# Patient Record
Sex: MALE | Race: BLACK OR AFRICAN AMERICAN | Employment: OTHER | URBAN - METROPOLITAN AREA
[De-identification: names, ages, dates, MRNs, and addresses within clinical notes are randomized per-mention and may not be internally consistent; named-entity substitution may affect disease eponyms.]

---

## 2021-01-19 ENCOUNTER — PATIENT OUTREACH (OUTPATIENT)
Dept: CASE MANAGEMENT | Age: 56
End: 2021-01-19

## 2021-01-19 ENCOUNTER — HOSPITAL ENCOUNTER (OUTPATIENT)
Dept: LAB | Age: 56
Discharge: HOME | End: 2021-01-19
Payer: MEDICARE

## 2021-01-19 DIAGNOSIS — C90.00 MULTIPLE MYELOMA NOT HAVING ACHIEVED REMISSION (HCC): ICD-10-CM

## 2021-01-19 LAB
25(OH)D3 SERPL-MCNC: 22.7 NG/ML (ref 30–100)
ALBUMIN SERPL-MCNC: 3.6 G/DL (ref 3.5–5)
ALBUMIN/GLOB SERPL: 1.1 {RATIO} (ref 1.2–3.5)
ALP SERPL-CCNC: 113 U/L (ref 50–136)
ALT SERPL-CCNC: 30 U/L (ref 12–65)
ANION GAP SERPL CALC-SCNC: 6 MMOL/L (ref 7–16)
AST SERPL-CCNC: 7 U/L (ref 15–37)
BASOPHILS # BLD: 0 K/UL (ref 0–0.2)
BASOPHILS NFR BLD: 0 % (ref 0–2)
BILIRUB SERPL-MCNC: 0.2 MG/DL (ref 0.2–1.1)
BUN SERPL-MCNC: 12 MG/DL (ref 6–23)
CALCIUM SERPL-MCNC: 9.2 MG/DL (ref 8.3–10.4)
CHLORIDE SERPL-SCNC: 103 MMOL/L (ref 98–107)
CO2 SERPL-SCNC: 26 MMOL/L (ref 21–32)
CREAT SERPL-MCNC: 0.7 MG/DL (ref 0.8–1.5)
DIFFERENTIAL METHOD BLD: ABNORMAL
EOSINOPHIL # BLD: 0 K/UL (ref 0–0.8)
EOSINOPHIL NFR BLD: 0 % (ref 0.5–7.8)
ERYTHROCYTE [DISTWIDTH] IN BLOOD BY AUTOMATED COUNT: 18 % (ref 11.9–14.6)
GLOBULIN SER CALC-MCNC: 3.2 G/DL (ref 2.3–3.5)
GLUCOSE SERPL-MCNC: 105 MG/DL (ref 65–100)
HCT VFR BLD AUTO: 35.5 % (ref 41.1–50.3)
HGB BLD-MCNC: 11 G/DL (ref 13.6–17.2)
IMM GRANULOCYTES # BLD AUTO: 0.1 K/UL (ref 0–0.5)
IMM GRANULOCYTES NFR BLD AUTO: 1 % (ref 0–5)
LYMPHOCYTES # BLD: 2.2 K/UL (ref 0.5–4.6)
LYMPHOCYTES NFR BLD: 19 % (ref 13–44)
MCH RBC QN AUTO: 25.6 PG (ref 26.1–32.9)
MCHC RBC AUTO-ENTMCNC: 31 G/DL (ref 31.4–35)
MCV RBC AUTO: 82.8 FL (ref 79.6–97.8)
MONOCYTES # BLD: 1.2 K/UL (ref 0.1–1.3)
MONOCYTES NFR BLD: 10 % (ref 4–12)
NEUTS SEG # BLD: 8.6 K/UL (ref 1.7–8.2)
NEUTS SEG NFR BLD: 71 % (ref 43–78)
NRBC # BLD: 0 K/UL (ref 0–0.2)
PLATELET # BLD AUTO: 518 K/UL (ref 150–450)
PMV BLD AUTO: 9.2 FL (ref 9.4–12.3)
POTASSIUM SERPL-SCNC: 3.8 MMOL/L (ref 3.5–5.1)
PROT SERPL-MCNC: 6.8 G/DL (ref 6.3–8.2)
RBC # BLD AUTO: 4.29 M/UL (ref 4.23–5.67)
SODIUM SERPL-SCNC: 135 MMOL/L (ref 136–145)
VIT B12 SERPL-MCNC: 586 PG/ML (ref 193–986)
WBC # BLD AUTO: 12.1 K/UL (ref 4.3–11.1)

## 2021-01-19 PROCEDURE — 36415 COLL VENOUS BLD VENIPUNCTURE: CPT

## 2021-01-19 PROCEDURE — 85025 COMPLETE CBC W/AUTO DIFF WBC: CPT

## 2021-01-19 PROCEDURE — 80053 COMPREHEN METABOLIC PANEL: CPT

## 2021-01-19 PROCEDURE — 82232 ASSAY OF BETA-2 PROTEIN: CPT

## 2021-01-19 PROCEDURE — 83883 ASSAY NEPHELOMETRY NOT SPEC: CPT

## 2021-01-19 PROCEDURE — 86334 IMMUNOFIX E-PHORESIS SERUM: CPT

## 2021-01-19 PROCEDURE — 82784 ASSAY IGA/IGD/IGG/IGM EACH: CPT

## 2021-01-19 PROCEDURE — 82607 VITAMIN B-12: CPT

## 2021-01-19 PROCEDURE — 82306 VITAMIN D 25 HYDROXY: CPT

## 2021-01-19 NOTE — PROGRESS NOTES
1/19 Pt came here from [facility] for a transplant consult. He has completed 3 cycles of RVD. He will need to complete 4 cycles. We want to do BMT studies in February and collect hopefully March 16th. I gave pt/wife a stem cell booklet to let them read through on kind of what to expect. I also gave pt/wife my card and told them to call me with any questions they have.

## 2021-01-21 LAB
B2 MICROGLOB SERPL-MCNC: 2 MG/L (ref 0.8–2.34)
KAPPA LC FREE SER-MCNC: 166.77 MG/L (ref 3.3–19.4)
KAPPA LC FREE/LAMBDA FREE SER: 94.22 {RATIO} (ref 0.26–1.65)
LAMBDA LC FREE SERPL-MCNC: 1.77 MG/L (ref 5.71–26.3)

## 2021-01-22 LAB
ALBUMIN SERPL ELPH-MCNC: 3.55 G/DL (ref 3.2–5.6)
ALBUMIN/GLOB SERPL: 1.3 {RATIO}
ALPHA1 GLOB SERPL ELPH-MCNC: 0.34 G/DL (ref 0.1–0.4)
ALPHA2 GLOB SERPL ELPH-MCNC: 0.85 G/DL (ref 0.4–1.2)
B-GLOBULIN SERPL QL ELPH: 0.93 G/DL (ref 0.6–1.3)
GAMMA GLOB MFR SERPL ELPH: 0.63 G/DL (ref 0.5–1.6)
IGA SERPL-MCNC: 12 MG/DL (ref 85–499)
IGG SERPL-MCNC: 538 MG/DL (ref 610–1616)
IGM SERPL-MCNC: <10 MG/DL (ref 35–242)
M PROTEIN SERPL ELPH-MCNC: 0.3 G/DL
PROT PATTERN SERPL ELPH-IMP: ABNORMAL
PROT PATTERN SPEC IFE-IMP: ABNORMAL
PROT SERPL-MCNC: 6.3 G/DL (ref 6.3–8.2)

## 2021-02-12 ENCOUNTER — TELEPHONE (OUTPATIENT)
Dept: CASE MANAGEMENT | Age: 56
End: 2021-02-12

## 2021-02-16 ENCOUNTER — HOSPITAL ENCOUNTER (OUTPATIENT)
Dept: PET IMAGING | Age: 56
Discharge: HOME | End: 2021-02-16

## 2021-02-16 ENCOUNTER — HOSPITAL ENCOUNTER (OUTPATIENT)
Dept: RESPIRATORY THERAPY | Age: 56
Discharge: HOME | End: 2021-02-16
Attending: INTERNAL MEDICINE
Payer: MEDICARE

## 2021-02-16 DIAGNOSIS — C90.00 MULTIPLE MYELOMA NOT HAVING ACHIEVED REMISSION (HCC): ICD-10-CM

## 2021-02-16 PROCEDURE — 94010 BREATHING CAPACITY TEST: CPT

## 2021-02-16 PROCEDURE — 94729 DIFFUSING CAPACITY: CPT

## 2021-02-16 PROCEDURE — 94726 PLETHYSMOGRAPHY LUNG VOLUMES: CPT

## 2021-02-18 ENCOUNTER — HOSPITAL ENCOUNTER (OUTPATIENT)
Dept: PET IMAGING | Age: 56
Discharge: HOME | End: 2021-02-18
Payer: MEDICARE

## 2021-02-18 ENCOUNTER — APPOINTMENT (OUTPATIENT)
Dept: NON INVASIVE DIAGNOSTICS | Age: 56
End: 2021-02-18
Attending: INTERNAL MEDICINE
Payer: MEDICARE

## 2021-02-18 ENCOUNTER — HOSPITAL ENCOUNTER (OUTPATIENT)
Dept: GENERAL RADIOLOGY | Age: 56
Discharge: HOME | End: 2021-02-18
Attending: INTERNAL MEDICINE
Payer: MEDICARE

## 2021-02-18 ENCOUNTER — HOSPITAL ENCOUNTER (OUTPATIENT)
Dept: LAB | Age: 56
Discharge: HOME | End: 2021-02-18
Payer: MEDICARE

## 2021-02-18 ENCOUNTER — HOSPITAL ENCOUNTER (OUTPATIENT)
Dept: NON INVASIVE DIAGNOSTICS | Age: 56
Discharge: HOME | End: 2021-02-18
Attending: INTERNAL MEDICINE
Payer: MEDICARE

## 2021-02-18 DIAGNOSIS — C90.00 MULTIPLE MYELOMA NOT HAVING ACHIEVED REMISSION (HCC): ICD-10-CM

## 2021-02-18 LAB
ATRIAL RATE: 110 BPM
CALCULATED P AXIS, ECG09: 52 DEGREES
CALCULATED R AXIS, ECG10: 66 DEGREES
CALCULATED T AXIS, ECG11: 66 DEGREES
DIAGNOSIS, 93000: NORMAL
P-R INTERVAL, ECG05: 210 MS
Q-T INTERVAL, ECG07: 314 MS
QRS DURATION, ECG06: 80 MS
QTC CALCULATION (BEZET), ECG08: 424 MS
VENTRICULAR RATE, ECG03: 110 BPM

## 2021-02-18 PROCEDURE — 93005 ELECTROCARDIOGRAM TRACING: CPT | Performed by: INTERNAL MEDICINE

## 2021-02-18 PROCEDURE — 84166 PROTEIN E-PHORESIS/URINE/CSF: CPT

## 2021-02-18 PROCEDURE — 77074 RADEX OSSEOUS SURVEY LMTD: CPT

## 2021-02-18 PROCEDURE — 93306 TTE W/DOPPLER COMPLETE: CPT

## 2021-02-18 PROCEDURE — 71046 X-RAY EXAM CHEST 2 VIEWS: CPT

## 2021-02-18 PROCEDURE — 74011000636 HC RX REV CODE- 636: Performed by: INTERNAL MEDICINE

## 2021-02-18 PROCEDURE — 78815 PET IMAGE W/CT SKULL-THIGH: CPT

## 2021-02-18 PROCEDURE — 86335 IMMUNFIX E-PHORSIS/URINE/CSF: CPT

## 2021-02-18 RX ORDER — SODIUM CHLORIDE 0.9 % (FLUSH) 0.9 %
10 SYRINGE (ML) INJECTION
Status: COMPLETED | OUTPATIENT
Start: 2021-02-18 | End: 2021-02-18

## 2021-02-18 RX ADMIN — DIATRIZOATE MEGLUMINE AND DIATRIZOATE SODIUM 10 ML: 660; 100 LIQUID ORAL; RECTAL at 10:26

## 2021-02-18 RX ADMIN — Medication 10 ML: at 10:26

## 2021-02-19 ENCOUNTER — PATIENT OUTREACH (OUTPATIENT)
Dept: CASE MANAGEMENT | Age: 56
End: 2021-02-19

## 2021-02-19 ENCOUNTER — HOSPITAL ENCOUNTER (OUTPATIENT)
Dept: LAB | Age: 56
Discharge: HOME | End: 2021-02-19
Payer: MEDICARE

## 2021-02-19 ENCOUNTER — HOSPITAL ENCOUNTER (OUTPATIENT)
Dept: INFUSION THERAPY | Age: 56
Discharge: HOME | End: 2021-02-19
Payer: MEDICARE

## 2021-02-19 ENCOUNTER — HOSPITAL ENCOUNTER (OUTPATIENT)
Dept: CT IMAGING | Age: 56
Discharge: HOME | End: 2021-02-19
Attending: INTERNAL MEDICINE
Payer: MEDICARE

## 2021-02-19 VITALS
HEART RATE: 90 BPM | DIASTOLIC BLOOD PRESSURE: 96 MMHG | OXYGEN SATURATION: 97 % | SYSTOLIC BLOOD PRESSURE: 123 MMHG | RESPIRATION RATE: 16 BRPM

## 2021-02-19 VITALS
DIASTOLIC BLOOD PRESSURE: 69 MMHG | OXYGEN SATURATION: 95 % | SYSTOLIC BLOOD PRESSURE: 107 MMHG | HEART RATE: 99 BPM | BODY MASS INDEX: 23.68 KG/M2 | WEIGHT: 169.8 LBS | RESPIRATION RATE: 18 BRPM | TEMPERATURE: 98.3 F

## 2021-02-19 DIAGNOSIS — C90.00 MULTIPLE MYELOMA NOT HAVING ACHIEVED REMISSION (HCC): ICD-10-CM

## 2021-02-19 LAB
ABO + RH BLD: NORMAL
ALBUMIN SERPL-MCNC: 3.1 G/DL (ref 3.5–5)
ALBUMIN UR ELPH-MCNC: 3.9 MG/DL
ALBUMIN/GLOB SERPL: 0.3 {RATIO}
ALBUMIN/GLOB SERPL: 0.9 {RATIO} (ref 1.2–3.5)
ALP SERPL-CCNC: 100 U/L (ref 50–136)
ALPHA1 GLOB 24H UR ELPH-MCNC: 1.1 MG/DL
ALPHA2 GLOB SERPL ELPH-MCNC: 2.9 MG/DL
ALT SERPL-CCNC: 23 U/L (ref 12–65)
ANION GAP SERPL CALC-SCNC: 6 MMOL/L (ref 7–16)
AST SERPL-CCNC: 4 U/L (ref 15–37)
B-GLOBULIN UR QL ELPH: 4.5 MG/DL
BASOPHILS # BLD: 0 K/UL (ref 0–0.2)
BASOPHILS NFR BLD: 0 % (ref 0–2)
BILIRUB SERPL-MCNC: 0.3 MG/DL (ref 0.2–1.1)
BONE MARROW PREP & W,BMA: NORMAL
BUN SERPL-MCNC: 8 MG/DL (ref 6–23)
CALCIUM SERPL-MCNC: 9.1 MG/DL (ref 8.3–10.4)
CHLORIDE SERPL-SCNC: 105 MMOL/L (ref 98–107)
CO2 SERPL-SCNC: 27 MMOL/L (ref 21–32)
COLLECT DURATION TIME UR: 24 HR
CREAT SERPL-MCNC: 0.7 MG/DL (ref 0.8–1.5)
DIFFERENTIAL METHOD BLD: ABNORMAL
DIFFERENTIAL METHOD BLD: ABNORMAL
EOSINOPHIL # BLD: 0.1 K/UL (ref 0–0.8)
EOSINOPHIL # BLD: 1.4 K/UL (ref 0–0.8)
EOSINOPHIL NFR BLD MANUAL: 1 % (ref 1–8)
EOSINOPHIL NFR BLD: 10 % (ref 0.5–7.8)
ERYTHROCYTE [DISTWIDTH] IN BLOOD BY AUTOMATED COUNT: 18.9 % (ref 11.9–14.6)
ERYTHROCYTE [DISTWIDTH] IN BLOOD BY AUTOMATED COUNT: 19.3 % (ref 11.9–14.6)
GAMMA GLOB MFR UR ELPH: 3.7 MG/DL
GLOBULIN SER CALC-MCNC: 3.5 G/DL (ref 2.3–3.5)
GLUCOSE SERPL-MCNC: 104 MG/DL (ref 65–100)
HBV SURFACE AB SERPL IA-ACNC: <3.1 MIU/ML
HCT VFR BLD AUTO: 34.9 % (ref 41.1–50.3)
HCT VFR BLD AUTO: 35.3 % (ref 41.1–50.3)
HGB BLD-MCNC: 10.9 G/DL (ref 13.6–17.2)
HGB BLD-MCNC: 10.9 G/DL (ref 13.6–17.2)
IMM GRANULOCYTES # BLD AUTO: 0.1 K/UL (ref 0–0.5)
IMM GRANULOCYTES NFR BLD AUTO: 1 % (ref 0–5)
LYMPHOCYTES # BLD: 1.7 K/UL (ref 0.5–4.6)
LYMPHOCYTES # BLD: 2 K/UL (ref 0.5–4.6)
LYMPHOCYTES NFR BLD MANUAL: 14 % (ref 16–44)
LYMPHOCYTES NFR BLD: 14 % (ref 13–44)
M PROTEIN 24H UR ELPH-MRATE: 64 MG/24HR
M PROTEIN UR-MCNC: 2.5 MG/DL
MAGNESIUM SERPL-MCNC: 2.1 MG/DL (ref 1.8–2.4)
MCH RBC QN AUTO: 24.9 PG (ref 26.1–32.9)
MCH RBC QN AUTO: 25.4 PG (ref 26.1–32.9)
MCHC RBC AUTO-ENTMCNC: 30.9 G/DL (ref 31.4–35)
MCHC RBC AUTO-ENTMCNC: 31.2 G/DL (ref 31.4–35)
MCV RBC AUTO: 79.7 FL (ref 79.6–97.8)
MCV RBC AUTO: 82.3 FL (ref 79.6–97.8)
MONOCYTES # BLD: 1.2 K/UL (ref 0.1–1.3)
MONOCYTES # BLD: 1.3 K/UL (ref 0.1–1.3)
MONOCYTES NFR BLD MANUAL: 10 % (ref 3–9)
MONOCYTES NFR BLD: 9 % (ref 4–12)
NEUTS SEG # BLD: 9.3 K/UL (ref 1.7–8.2)
NEUTS SEG # BLD: 9.4 K/UL (ref 1.7–8.2)
NEUTS SEG NFR BLD MANUAL: 75 % (ref 47–75)
NEUTS SEG NFR BLD: 66 % (ref 43–78)
NRBC # BLD: 0 K/UL (ref 0–0.2)
NRBC # BLD: 0 K/UL (ref 0–0.2)
PLATELET # BLD AUTO: 223 K/UL (ref 150–450)
PLATELET # BLD AUTO: 271 K/UL (ref 150–450)
PLATELET COMMENTS,PCOM: ADEQUATE
PLATELET COMMENTS,PCOM: ADEQUATE
PMV BLD AUTO: 11.7 FL (ref 9.4–12.3)
PMV BLD AUTO: 12.4 FL (ref 9.4–12.3)
POTASSIUM SERPL-SCNC: 3.6 MMOL/L (ref 3.5–5.1)
PROT 24H UR-MRATE: 408 MG/24HR
PROT PATTERN SPEC IFE-IMP: NORMAL
PROT PATTERN UR ELPH-IMP: NORMAL
PROT SERPL-MCNC: 6.6 G/DL (ref 6.3–8.2)
PROT UR-MCNC: 16 MG/DL
RBC # BLD AUTO: 4.29 M/UL (ref 4.23–5.6)
RBC # BLD AUTO: 4.38 M/UL (ref 4.23–5.67)
RBC MORPH BLD: ABNORMAL
SODIUM SERPL-SCNC: 138 MMOL/L (ref 136–145)
SPECIMEN VOL ?TM UR: 2550 ML
WBC # BLD AUTO: 12.4 K/UL (ref 4.3–11.1)
WBC # BLD AUTO: 14.1 K/UL (ref 4.3–11.1)
WBC MORPH BLD: ABNORMAL
WBC MORPH BLD: ABNORMAL

## 2021-02-19 PROCEDURE — 38222 DX BONE MARROW BX & ASPIR: CPT

## 2021-02-19 PROCEDURE — 86790 VIRUS ANTIBODY NOS: CPT

## 2021-02-19 PROCEDURE — 88365 INSITU HYBRIDIZATION (FISH): CPT

## 2021-02-19 PROCEDURE — 82784 ASSAY IGA/IGD/IGG/IGM EACH: CPT

## 2021-02-19 PROCEDURE — 88184 FLOWCYTOMETRY/ TC 1 MARKER: CPT

## 2021-02-19 PROCEDURE — 86665 EPSTEIN-BARR CAPSID VCA: CPT

## 2021-02-19 PROCEDURE — 83021 HEMOGLOBIN CHROMOTOGRAPHY: CPT

## 2021-02-19 PROCEDURE — 83883 ASSAY NEPHELOMETRY NOT SPEC: CPT

## 2021-02-19 PROCEDURE — 88341 IMHCHEM/IMCYTCHM EA ADD ANTB: CPT

## 2021-02-19 PROCEDURE — 99152 MOD SED SAME PHYS/QHP 5/>YRS: CPT

## 2021-02-19 PROCEDURE — 86334 IMMUNOFIX E-PHORESIS SERUM: CPT

## 2021-02-19 PROCEDURE — 85025 COMPLETE CBC W/AUTO DIFF WBC: CPT

## 2021-02-19 PROCEDURE — 88342 IMHCHEM/IMCYTCHM 1ST ANTB: CPT

## 2021-02-19 PROCEDURE — 88313 SPECIAL STAINS GROUP 2: CPT

## 2021-02-19 PROCEDURE — 36415 COLL VENOUS BLD VENIPUNCTURE: CPT

## 2021-02-19 PROCEDURE — 74011250636 HC RX REV CODE- 250/636: Performed by: RADIOLOGY

## 2021-02-19 PROCEDURE — 88311 DECALCIFY TISSUE: CPT

## 2021-02-19 PROCEDURE — 80053 COMPREHEN METABOLIC PANEL: CPT

## 2021-02-19 PROCEDURE — 86901 BLOOD TYPING SEROLOGIC RH(D): CPT

## 2021-02-19 PROCEDURE — 88305 TISSUE EXAM BY PATHOLOGIST: CPT

## 2021-02-19 PROCEDURE — 83735 ASSAY OF MAGNESIUM: CPT

## 2021-02-19 PROCEDURE — 86706 HEP B SURFACE ANTIBODY: CPT

## 2021-02-19 PROCEDURE — 88185 FLOWCYTOMETRY/TC ADD-ON: CPT

## 2021-02-19 PROCEDURE — 86592 SYPHILIS TEST NON-TREP QUAL: CPT

## 2021-02-19 PROCEDURE — 99213 OFFICE O/P EST LOW 20 MIN: CPT

## 2021-02-19 PROCEDURE — 86694 HERPES SIMPLEX NES ANTBDY: CPT

## 2021-02-19 PROCEDURE — 86787 VARICELLA-ZOSTER ANTIBODY: CPT

## 2021-02-19 PROCEDURE — 88364 INSITU HYBRIDIZATION (FISH): CPT

## 2021-02-19 RX ORDER — SODIUM CHLORIDE 9 MG/ML
25 INJECTION, SOLUTION INTRAVENOUS CONTINUOUS
Status: DISCONTINUED | OUTPATIENT
Start: 2021-02-19 | End: 2021-02-23 | Stop reason: HOSPADM

## 2021-02-19 RX ORDER — FENTANYL CITRATE 50 UG/ML
12.5-1 INJECTION, SOLUTION INTRAMUSCULAR; INTRAVENOUS
Status: DISCONTINUED | OUTPATIENT
Start: 2021-02-19 | End: 2021-02-23 | Stop reason: HOSPADM

## 2021-02-19 RX ORDER — MIDAZOLAM HYDROCHLORIDE 1 MG/ML
.25-2 INJECTION, SOLUTION INTRAMUSCULAR; INTRAVENOUS
Status: DISCONTINUED | OUTPATIENT
Start: 2021-02-19 | End: 2021-02-23 | Stop reason: HOSPADM

## 2021-02-19 RX ORDER — DIPHENHYDRAMINE HYDROCHLORIDE 50 MG/ML
12.5-5 INJECTION, SOLUTION INTRAMUSCULAR; INTRAVENOUS ONCE
Status: COMPLETED | OUTPATIENT
Start: 2021-02-19 | End: 2021-02-19

## 2021-02-19 RX ORDER — LIDOCAINE HYDROCHLORIDE 20 MG/ML
40-120 INJECTION, SOLUTION INFILTRATION; PERINEURAL ONCE
Status: DISCONTINUED | OUTPATIENT
Start: 2021-02-19 | End: 2021-02-20 | Stop reason: HOSPADM

## 2021-02-19 RX ADMIN — DIPHENHYDRAMINE HYDROCHLORIDE 50 MG: 50 INJECTION, SOLUTION INTRAMUSCULAR; INTRAVENOUS at 14:11

## 2021-02-19 RX ADMIN — MIDAZOLAM 2 MG: 1 INJECTION INTRAMUSCULAR; INTRAVENOUS at 14:26

## 2021-02-19 RX ADMIN — MIDAZOLAM 1 MG: 1 INJECTION INTRAMUSCULAR; INTRAVENOUS at 14:15

## 2021-02-19 RX ADMIN — SODIUM CHLORIDE 25 ML/HR: 900 INJECTION, SOLUTION INTRAVENOUS at 14:00

## 2021-02-19 RX ADMIN — FENTANYL CITRATE 50 MCG: 50 INJECTION, SOLUTION INTRAMUSCULAR; INTRAVENOUS at 14:11

## 2021-02-19 RX ADMIN — MIDAZOLAM 1 MG: 1 INJECTION INTRAMUSCULAR; INTRAVENOUS at 14:19

## 2021-02-19 RX ADMIN — FENTANYL CITRATE 50 MCG: 50 INJECTION, SOLUTION INTRAMUSCULAR; INTRAVENOUS at 14:21

## 2021-02-19 NOTE — PROCEDURES
Department of Interventional Radiology  [phone]        Interventional Radiology Brief Procedure Note    Patient: Jo Wheeler MRN: [id number]  SSN: [id number]    YOB: 1965  Age: 48 y.o.   Sex: male      Date of Procedure: 2/19/2021    Pre-Procedure Diagnosis: MM    Post-Procedure Diagnosis: SAME    Procedure(s): Image Guided Biopsy    Brief Description of Procedure: BM biopsy    Performed By: Blossom Thacker MD     Assistants: None    Anesthesia:Moderate Sedation    Estimated Blood Loss: Less than 10ml    Specimens:  Pathology    Implants:  None    Findings: lytic lesions in bones    Complications: None    Recommendations: routine post care     Follow Up: as needed    Signed By: Blossom Thacker MD     February 19, 2021

## 2021-02-19 NOTE — PROGRESS NOTES
Recovery period without difficulty. Pt alert and oriented and denies pain. Dressing is clean, dry, and intact. Reviewed discharge instructions with patient and fiance, both verbalized understanding. Pt escorted to lobby discharge area via wheelchair. Vital signs and Ju[place] score completed.

## 2021-02-19 NOTE — H&P
History and Physical    Patient: Annabella Nava MRN: [id number]  SSN: [id number]    YOB: 1965  Age: 48 y.o. Sex: male      Subjective:      Annabella Nava is a 48 y.o. male who has Multiple myeloma and needs bm biopsy  For restaging. NPO. Past Medical History:   Diagnosis Date    Headache      Past Surgical History:   Procedure Laterality Date    HX KNEE REPLACEMENT Left 06/2000    HX KYPHOPLASTY  08/2020      No family history on file. Social History     Tobacco Use    Smoking status: Never Smoker    Smokeless tobacco: Never Used   Substance Use Topics    Alcohol use: Never     Frequency: Never     Binge frequency: Never      Prior to Admission medications    Medication Sig Start Date End Date Taking? Authorizing Provider   potassium chloride (K-DUR, KLOR-CON) 20 mEq tablet Take 1 Tab by mouth two (2) times a day. 2/12/21  Yes Mckenzie Velazco NP   acyclovir (ZOVIRAX) 400 mg tablet  1/14/21  Yes Provider, Historical   cholecalciferol (VITAMIN D3) (1000 Units /25 mcg) tablet Take 1,000 Units by mouth daily. 9/18/20 9/18/21 Yes Provider, Historical   cyclobenzaprine (FLEXERIL) 10 mg tablet  12/29/20  Yes Provider, Historical   Revlimid 25 mg cap  12/29/20  Yes Provider, Historical   LORazepam (ATIVAN) 0.5 mg tablet  10/20/20  Yes Provider, Historical   montelukast (SINGULAIR) 10 mg tablet  12/17/20  Yes Provider, Historical   morphine CR (MS CONTIN) 60 mg CR tablet  1/15/21  Yes Provider, Historical   oxyCODONE-acetaminophen (PERCOCET 10)  mg per tablet  1/8/21  Yes Provider, Historical   pantoprazole (PROTONIX) 40 mg tablet  12/5/20  Yes Provider, Historical   zolpidem (AMBIEN) 5 mg tablet TAKE 1 TABLET BY MOUTH NIGHTLY AS NEEDED FOR SLEEP 1/6/21  Yes Provider, Historical   aspirin (ASPIRIN) 325 mg tablet Take 1 Tab by mouth daily. 9/30/20  Yes Provider, Historical   bortezomib (VELCADE INJECTION) by Injection route.    Yes Provider, Historical   dexAMETHasone (DECADRON) 4 mg tablet TAKE 5 TABLETS BY MOUTH 2 (TWO) TIMES A WEEK WITH VELCADE TREATMENT 1/5/21   Provider, Historical   ferrous sulfate 325 mg (65 mg iron) tablet Take 325 mg by mouth. 10/19/20   Provider, Historical        No Known Allergies    Review of Systems:  Pertinent items are noted in the History of Present Illness.    Objective:     There were no vitals filed for this visit.     Physical Exam:  LUNG: clear to auscultation bilaterally  HEART: regular rate and rhythm    Assessment:         Plan:     CT guided bone marrow biopsy.  Moderate sedation.    Signed By: Ventura Rangel MD     February 19, 2021

## 2021-02-19 NOTE — PROGRESS NOTES
2/19     BMT Education: Navigator spoke with Jessica Marquez and Wife Dexter Quinn) for BMT education. Patient given BMT education notebook. All sections discussed. Patient encouraged to write down any questions or concerns. Cari Thomas and Petty Green are aware of opportunity to meet again for any final questions. Content gone over verbally and given in writing including drug education sheets. Granix side effects, and administration guidelines discussed. Patient aware to report any left side pain during mobilization for the possibility of spleen enlargement or rupture from Granix. Patient aware splenic rupture is extremely rare. Patient stated understanding. The possible use of Mozobil injections was also discussed. Patient aware that mozobil may  started on Monday evening before collection begins. Patient educated on administration of mozobil and the rationale for use during mobilization process. Patient is aware of being NPO stating midnight on Sunday for 3/7/21 apheresis CVC placement on Monday 3/8/21 and stated understanding. Apheresis consult completed 2/19/21 by Bran Mejía. Patient aware of the possibility of collection being Tuesday through Friday. Mobilzation schedule given and gone over. Patient aware Tunneled CVC will  be removed after engraftment post transplant. Patient stated understanding. High dose education gone over and written information given on Melphalan. Patient aware of possible side effects from chemo including but not limited oral mucositis, diarrhea, n/v, and hair loss. Patient aware of stem cell reinfusion will occur at least 24 hours after the chemo was completed (the next day). Patient aware to prepare for daily visits at least 3 weeks during high dose and the possibility of a 4th week. Patient aware of the need for a 24 hr caregiver during high dose through recovery. A copy of treatment consents given to patient for review.  Patient is aware to review consent and sign prior to high dose therapy being initiated. Patient aware of the risks involved with BMT including but not limited to infection, bleeding, engraftment failure, and death. Questions were answered and again encouraged patient to read material and write down any questions. Patient aware of next appointment with Dr. Sharon Scott for pre-study results and mobilization eval.    Due to scheduling and transportation issues, pt was given a education binder at Apheresis consult and was OK to be given BMT education over the phone. Told pt and wife that this RN will be happy to meet with them in person if they had any questions or wanted to go over education again in person.  Spent 51 minutes on the phone for BMT education

## 2021-02-19 NOTE — PROGRESS NOTES
PSYCHOLOGY ADULT BLOOD AND BONE MARROW PRE-SCREEN      Name:  Roula Duncan  Date of Service: 2/19/2021  Type of Service: Health and Behavior Initial Assessment ([id number])  Duration:  15 minutes    SW met with pt and wife, Reddy Linton [phone] to introduce self and services while assessing potential barriers to care. Pt's wife will be his caregiver during BMT. No barriers noted at this time. Pt has 15 yo daughter and 22 yo son. They verbalized financial concerns due to decreased income as pt's wife is on leave with no pay. SW offered referral to [facility] as well as additional resource information. They verbalized agreement and requested it be emailed. SW confirmed email address and sent info for [facility] in the [place] [address]. SW faxed referral to [facility] to [phone], to be scanned into EMR. SW also consulting with financial navigator Dk about possible assistance through [facility] Shankar Padilla, who will follow up with pt. Drug and Alcohol Abuse or Dependence: None Noted    Behavioral / Emotional / Mental Health: None Noted    Family & Social Support: Present    Educational / Learning Disability: None Noted    Language Barriers: No  Required    SW provided pt with SW contact information and encouraged them to call should any needs arise. Pt verbalized understanding. SW intends to follow up PRN.     Electronically Signed By: Ekaterina Bowman, [address] [employer]

## 2021-02-19 NOTE — PROGRESS NOTES
Pt arrived via w/c accompanied by significant other. Apheresis consult completed. 35 minutes spent on education. Verbal and written information was given on process of stem cell collection, including potential side effects from process as well as medicines such as Mozobil and Granix. Instructed pt to take po Claritin for pain prevention. Handouts given on Claritin for pain, [facility], and hand hygiene. Donor questionnaire was completed. Any questions answered yes beyond reading material:yes, pt has been receiving velcade injections (no/yes). Physician was notified of any yes responses, N/A, Md aware of pt's previous treatments . All questions were answered, pt verbalized understanding.

## 2021-02-19 NOTE — DISCHARGE INSTRUCTIONS
[address]  Department of Interventional Radiology  [facility]  [phone] Office  [phone] Fax    BIOPSY DISCHARGE INSTRUCTIONS    General Instructions:     A biopsy is the removal of a small piece of tissue for microscopic examination or testing. Healthy tissue can be obtained for the purpose of tissue-type matching for transplants. Unhealthy tissues are more commonly biopsied to diagnose disease. Lung Biopsy:     A needle lung biopsy is performed when there is a mass discovered in the lung area. The most serious risk is infection, bleeding, and pneumothorax (a collapsed lung). Signs of pneumothorax include shortness of breath, rapid heart rate, and blueness of the skin. If any of these occur, call 911. Liver Biopsy: This test helps detect cancer, infections, and the cause of an enlargement of the liver or elevated liver enzymes. It also helps to diagnose a number of liver diseases. The pain associated with the procedure may be felt in the shoulder. The risks include internal bleeding, pneumothorax, and injury to the surrounding organs. Renal Biopsy: This procedure is sometimes done to evaluate a transplanted kidney. It is also used to evaluate unexplained decrease in kidney function, blood, or protein in the urine. You may see bright red blood in the urine the first 24 hours after the test. If the bleeding lasts longer, you need to call your doctor. There is a risk of infection and bleeding into the muscle, which may cause soreness. Spinal Biopsy: This test is sometimes done in conjunction with other procedures. Your back will be sore, as we are taking a small sample of bone, which is slightly more difficult to sample than tissue. General Biopsy:     A mass can grow in any area of the body, and we are taking a specimen as ordered by your doctor. The risks are the same.  They include bleeding, pain, and infection. Home Care Instructions: You may resume your regular diet and medication regimen. Do not drink alcohol, drive, or make any important legal decisions in the next 24 hours. Do not lift anything heavier than a gallon of milk until the soreness goes away. You may use over the counter acetaminophen or ibuprofen for the soreness. You may apply an ice pack to the affected area for 20-30 minutes at time for the first 24 hours. After that, you may apply a heat pack. Call If: You should call your Physician and/or the Radiology Nurse if you have any questions or concerns about the biopsy site. Call if you should have increased pain, fever, redness, drainage, or bleeding more than a small spot on the bandage. Follow-Up Instructions: Please see your ordering doctor as he/she has requested. To Reach Us: If you have any questions about your procedure, please call the Interventional Radiology department at [phone]. After business hours (5pm) and weekends, call the answering service at [phone] and ask for the Radiologist on call to be paged. Si tiene Preguntas acerca del procedimiento, por favor llame al departamento de Radiología Intervencional al [phone]. Después de horas de oficina (5 pm) y los fines de [place], llamar al Desiree Dunn al [phone] y pregunte por el Radiologo de [facility]. Interventional Radiology General Nurse Discharge    After general anesthesia or intravenous sedation, for 24 hours or while taking prescription Narcotics:  · Limit your activities  · Do not drive and operate hazardous machinery  · Do not make important personal or business decisions  · Do  not drink alcoholic beverages  · If you have not urinated within 8 hours after discharge, please contact your surgeon on call. * Please give a list of your current medications to your Primary Care Provider.   * Please update this list whenever your medications are discontinued, doses are changed, or new medications (including over-the-counter products) are added. * Please carry medication information at all times in case of emergency situations. These are general instructions for a healthy lifestyle:    No smoking/ No tobacco products/ Avoid exposure to second hand smoke  Surgeon General's Warning:  Quitting smoking now greatly reduces serious risk to your health. Obesity, smoking, and sedentary lifestyle greatly increases your risk for illness  A healthy diet, regular physical exercise & weight monitoring are important for maintaining a healthy lifestyle    You may be retaining fluid if you have a history of heart failure or if you experience any of the following symptoms:  Weight gain of 3 pounds or more overnight or 5 pounds in a week, increased swelling in our hands or feet or shortness of breath while lying flat in bed. Please call your doctor as soon as you notice any of these symptoms; do not wait until your next office visit. Recognize signs and symptoms of STROKE:  F-face looks uneven    A-arms unable to move or move unevenly    S-speech slurred or non-existent    T-time-call 911 as soon as signs and symptoms begin-DO NOT go       Back to bed or wait to see if you get better-TIME IS BRAIN.       Patient Signature:  Date: 2/19/2021  Discharging Nurse: Marimar Villarreal

## 2021-02-20 LAB
EBV EA IGG SER-ACNC: ABNORMAL U/ML
EBV NA IGG SER-ACNC: <18 U/ML (ref 0–17.9)
EBV VCA IGG SER-ACNC: 419 U/ML (ref 0–17.9)
EBV VCA IGM SER-ACNC: <36 U/ML (ref 0–35.9)
HSV1+2 IGM SER IA-ACNC: <0.91 RATIO (ref 0–0.9)
INTERPRETATION, 169995: ABNORMAL
VZV IGG SER IA-ACNC: <135 INDEX

## 2021-02-22 LAB
ALBUMIN SERPL ELPH-MCNC: 3.34 G/DL (ref 3.2–5.6)
ALBUMIN/GLOB SERPL: 1.3 {RATIO}
ALPHA1 GLOB SERPL ELPH-MCNC: 0.39 G/DL (ref 0.1–0.4)
ALPHA2 GLOB SERPL ELPH-MCNC: 0.85 G/DL (ref 0.4–1.2)
B-GLOBULIN SERPL QL ELPH: 0.93 G/DL (ref 0.6–1.3)
DEPRECATED HGB OTHER BLD-IMP: 0 %
GAMMA GLOB MFR SERPL ELPH: 0.49 G/DL (ref 0.5–1.6)
HGB A MFR BLD: 98.1 % (ref 96.4–98.8)
HGB A2 MFR BLD COLUMN CHROM: 1.9 % (ref 1.8–3.2)
HGB C MFR BLD: 0 %
HGB F MFR BLD: 0 % (ref 0–2)
HGB FRACT BLD-IMP: NORMAL
HGB S BLD QL SOLY: NEGATIVE
HGB S MFR BLD: 0 %
IGA SERPL-MCNC: 8 MG/DL (ref 85–499)
IGG SERPL-MCNC: 459 MG/DL (ref 610–1616)
IGM SERPL-MCNC: <10 MG/DL (ref 35–242)
KAPPA LC FREE SER-MCNC: 163.6 MG/L (ref 3.3–19.4)
KAPPA LC FREE/LAMBDA FREE SER: 69.62 {RATIO} (ref 0.26–1.65)
LAMBDA LC FREE SERPL-MCNC: 2.35 MG/L (ref 5.71–26.3)
M PROTEIN SERPL ELPH-MCNC: 0.26 G/DL
PROT PATTERN SERPL ELPH-IMP: ABNORMAL
PROT PATTERN SPEC IFE-IMP: ABNORMAL
PROT SERPL-MCNC: 6 G/DL (ref 6.3–8.2)
VZV IGM SER IA-ACNC: <0.91 INDEX (ref 0–0.9)

## 2021-02-23 PROBLEM — J96.11 CHRONIC RESPIRATORY FAILURE WITH HYPOXIA (HCC): Status: ACTIVE | Noted: 2021-02-23

## 2021-02-23 PROBLEM — J84.9 ILD (INTERSTITIAL LUNG DISEASE) (HCC): Status: ACTIVE | Noted: 2021-02-23

## 2021-02-23 LAB
HSV1 IGG SER QL: NORMAL
PATH REV BLD -IMP: NORMAL

## 2021-02-23 NOTE — H&P (VIEW-ONLY)
[address]. [address] [facility], [address] [place] [address] 
[phone] Patient Name:  Ghazal Rich YOB: 1965 Office Visit 2/23/2021 CHIEF COMPLAINT:   
Chief Complaint Patient presents with  
 Other Multiple Myeloma HISTORY OF PRESENT ILLNESS:   
I had the pleasure of seeing Mr. Rolando Nickerson in our clinic today. Mr. Leisa Amaya is a 48 y.o. male who presents with a history of MM. He started on RVD in summer 2020 and radiation therapy at that time too. He states he went to the hospital in late July in [facility] after falling at home. Testing showed findings of MM. He was admitted through October and was discharged on 3L. It seems that the cause of his O2 need was not very clear at that time, though a CT scan report from August mentions interstitial prominence. His O2 requirements have improved since then and he is using his O2 as needed since then. No previous history of lung problems. Recently he has been considered for stem cell transplant. However, PET scan seems to show ongoing B infiltrates, and cPFTs show moderate restriction with significant reduction in diffusion capacity. He denies any history of autoimmune disease. No significant inhaled exposure. 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 Past Medical History:  
Diagnosis Date  Headache Problem List  Never Reviewed Codes Class Noted Chronic respiratory failure with hypoxia [facility]) ICD-10-CM: J96.11 
ICD-9-CM: 518.83, 799.02  2/23/2021 ILD (interstitial lung disease) ([facility] [address] ICD-10-CM: J84.9 ICD-9-CM: 373  2/23/2021 Multiple myeloma not having achieved remission ([facility] [address] ICD-10-CM: C90.00 ICD-9-CM: 203.00  2/16/2021 Past Surgical History:  
Procedure Laterality Date  HX KNEE REPLACEMENT Left 06/2000  HX KYPHOPLASTY  08/2020 No flowsheet data found. Social History Socioeconomic History  Marital status: SINGLE Spouse name: Not on file  Number of children: Not on file  Years of education: Not on file  Highest education level: Not on file Occupational History  Not on file Social Needs  Financial resource strain: Not on file  Food insecurity Worry: Not on file Inability: Not on file  Transportation needs Medical: Not on file Non-medical: Not on file Tobacco Use  Smoking status: Former Smoker Packs/day: 1.00 Years: 4.00 Pack years: 4.00 Quit date:  Years since quittin.1  Smokeless tobacco: Never Used Substance and Sexual Activity  Alcohol use: Never Frequency: Never Binge frequency: Never  Drug use: Not on file  Sexual activity: Not on file Lifestyle  Physical activity Days per week: Not on file Minutes per session: Not on file  Stress: Not on file Relationships  Social connections Talks on phone: Not on file Gets together: Not on file Attends Restorationism service: Not on file Active member of club or organization: Not on file Attends meetings of clubs or organizations: Not on file Relationship status: Not on file  Intimate partner violence Fear of current or ex partner: Not on file Emotionally abused: Not on file Physically abused: Not on file Forced sexual activity: Not on file Other Topics Concern  Not on file Social History Narrative  Not on file No family history on file. No Known Allergies Current Outpatient Medications Medication Sig  potassium chloride (K-DUR, KLOR-CON) 20 mEq tablet Take 1 Tab by mouth two (2) times a day.  acyclovir (ZOVIRAX) 400 mg tablet  cholecalciferol (VITAMIN D3) (1000 Units /25 mcg) tablet Take 1,000 Units by mouth daily.  cyclobenzaprine (FLEXERIL) 10 mg tablet  dexAMETHasone (DECADRON) 4 mg tablet TAKE 5 TABLETS BY MOUTH 2 (TWO) TIMES A WEEK WITH VELCADE TREATMENT  ferrous sulfate 325 mg (65 mg iron) tablet Take 325 mg by mouth.  Revlimid 25 mg cap  LORazepam (ATIVAN) 0.5 mg tablet  montelukast (SINGULAIR) 10 mg tablet  morphine CR (MS CONTIN) 60 mg CR tablet  oxyCODONE-acetaminophen (PERCOCET 10)  mg per tablet  pantoprazole (PROTONIX) 40 mg tablet  zolpidem (AMBIEN) 5 mg tablet TAKE 1 TABLET BY MOUTH NIGHTLY AS NEEDED FOR SLEEP  aspirin (ASPIRIN) 325 mg tablet Take 1 Tab by mouth daily.  bortezomib (VELCADE INJECTION) by Injection route. No current facility-administered medications for this visit. REVIEW OF SYSTEMS: 
 
Review of Systems Constitutional: Negative for chills, diaphoresis, fever, malaise/fatigue and weight loss. HENT: Negative for congestion, ear discharge, ear pain, hearing loss, nosebleeds, sinus pain, sore throat and tinnitus. Eyes: Negative for blurred vision, pain and redness. Respiratory: Positive for wheezing. Negative for cough, hemoptysis, sputum production and shortness of breath. Cardiovascular: Negative for chest pain, palpitations, orthopnea, leg swelling and PND. Gastrointestinal: Negative for abdominal pain, blood in stool, constipation, diarrhea, heartburn, melena, nausea and vomiting. Genitourinary: Negative for dysuria, frequency, hematuria and urgency. Musculoskeletal: Positive for back pain, joint pain and neck pain. Negative for myalgias. Skin: Negative for itching and rash. Neurological: Positive for headaches. Negative for dizziness, tremors, focal weakness and seizures. Endo/Heme/Allergies: Positive for environmental allergies. Does not bruise/bleed easily. Psychiatric/Behavioral: Negative for depression, hallucinations, memory loss and suicidal ideas. The patient is not nervous/anxious. PHYSICAL EXAM: 
Visit Vitals /80 Pulse (!) 106 Temp 98 °F (36.7 °C) (Temporal) Resp 20 Ht 5' 10\" (1.778 m) Wt 169 lb (76.7 kg) SpO2 94% BMI 24.25 kg/m² GENERAL APPEARANCE: 
The patient is normal weight and in no respiratory distress. HEENT: 
PERRL. Conjunctivae unremarkable. NECK/LYMPHATIC: 
Symmetrical with no elevation of jugular venous pulsation. Trachea midline. No thyroid enlargement. No cervical adenopathy. LUNGS: 
Normal respiratory effort with symmetrical lung expansion. Breath sounds reveal bibasilar inspiratory crackles. HEART: 
There is a regular rate and rhythm. No murmur, rub, or gallop. ABDOMEN: 
Soft and non-tender. No hepatosplenomegaly. Bowel sounds are normal.   
NEURO/PSYCH: 
The patient is alert and oriented to person, place, and time. Memory appears intact and mood is normal.  No gross sensorimotor deficits are present. MS/SKIN: 
There is no edema in the lower extremities. No rashes, bruises, lesions, ulcers visible. DIAGNOSTIC TESTS: 
CT of chest:   
Results from Abstract encounter on 02/22/21 CT ABD PELV W CONT No results found for this or any previous visit. Results for orders placed in visit on 02/22/21 CT CHEST W CONT No results found for this or any previous visit. CXR:  02/18/21 Results for orders placed during the hospital encounter of 02/18/21 XR CHEST PA LAT Impression 1. Moderate round opacity in the right midlung is indeterminate for possible 
pulmonary lesion. This can be better assessed on scheduled PET/CT for later same 
day. 2. Moderate bibasilar linear opacities which can reflect scarring or 
atelectasis. 3. Likely compression deformities of the midthoracic spine, prior vertebral 
augmentation, and old right lateral rib fractures. PET/CT:  
Results from Hospital Encounter encounter on 02/16/21 PET/CT TUMOR IMAGE SKULL THIGH (SUB) Impression 1. Diffuse myelomatous involvement throughout the skeleton without 
hypermetabolic lesions. 2.  No definite extramedullary disease. No results found for this or any previous visit. Exercise oximetry:   
 
 
Spirometry:  
Date:    02/16/21 FVC    2.15-5.13       
FEV1    2.41-4.04       
FEV1/FVC    77 FEF 25-75%    1.50-5.14 Bronchodilator Response TLC    3.93-8.29       
RV    1.78-2.96 DLCO    2.07-5.13 Echo: 
No results found for this or any previous visit. ASSESSMENT:  (Medical Decision Making) ICD-10-CM ICD-9-CM 1. ILD (interstitial lung disease) ([facility])  J84.9 515 JENNIFER, DIRECT, W/REFLEX  
   ANCA PANEL  
   CYCLIC CITRUL PEPTIDE AB, IGG  
   CK  
   C REACTIVE PROTEIN, QT  
   SED RATE, AUTOMATED HYPERSENS PNEUMONITIS I  
   RHEUMATOID FACTOR, QT  
2. Multiple myeloma not having achieved remission ([facility])  C90.00 203.00   
3. Chronic respiratory failure with hypoxia ([facility])  J96.11 518.83   
  799.02 Pt with MM, but also known to have chronic hypoxic respiratory failure with restriction on PFT's and signs of ILD on imaging. Ddx for this is broad including chronic infections/inflammatory processes, underlying auto immune disorder, exposures, or even MM itself. There have been case reports of MM infiltrates presenting as interstitial prominence. PLAN: 
-stem cell transplant should be held while his ILD is worked up.  
-will check auto immune serologies 
-hold asa and will perform bronch with BAL to rule out infection and TBBx looking for signs of sarcoid, , HP, MM, and others. F/u in about a month. Portions of this note were created using voice recognition software. As such, error of speech recognition may have occurred. Orders Placed This Encounter  JENNIFER, DIRECT, W/REFLEX Standing Status:   Future Standing Expiration Date:   2/23/2022  ANCA PANEL Standing Status:   Future Standing Expiration Date:   1/13/5901  
 CYCLIC CITRUL PEPTIDE AB, IGG Standing Status:   Future Standing Expiration Date:   2/23/2022  CK Standing Status:   Future Standing Expiration Date:   2/23/2022  C REACTIVE PROTEIN, QT Standing Status:   Future Standing Expiration Date:   2/23/2022  SED RATE, AUTOMATED Standing Status:   Future Standing Expiration Date:   2/23/2022  
 HYPERSENS PNEUMONITIS I Standing Status:   Future Standing Expiration Date:   2/23/2022  RHEUMATOID FACTOR, QT Standing Status:   Future Standing Expiration Date:   2/23/2022 Izabela Ozuna MD 
Electronically signed

## 2021-03-03 ENCOUNTER — ANESTHESIA EVENT (OUTPATIENT)
Dept: ENDOSCOPY | Age: 56
End: 2021-03-03
Payer: MEDICARE

## 2021-03-03 NOTE — PROGRESS NOTES
Patients procedure moved to 9am from 8am. Called patient to let him know the new arrival time is 7:30.  He verbalizes understanding and will arrive tomorrow morning at 7:30

## 2021-03-04 ENCOUNTER — APPOINTMENT (OUTPATIENT)
Dept: GENERAL RADIOLOGY | Age: 56
End: 2021-03-04
Attending: INTERNAL MEDICINE
Payer: MEDICARE

## 2021-03-04 ENCOUNTER — HOSPITAL ENCOUNTER (OUTPATIENT)
Age: 56
Setting detail: OUTPATIENT SURGERY
Discharge: HOME | End: 2021-03-04
Attending: INTERNAL MEDICINE | Admitting: INTERNAL MEDICINE
Payer: MEDICARE

## 2021-03-04 ENCOUNTER — ANESTHESIA (OUTPATIENT)
Dept: ENDOSCOPY | Age: 56
End: 2021-03-04
Payer: MEDICARE

## 2021-03-04 VITALS
HEIGHT: 69 IN | RESPIRATION RATE: 16 BRPM | TEMPERATURE: 97.9 F | DIASTOLIC BLOOD PRESSURE: 70 MMHG | OXYGEN SATURATION: 92 % | WEIGHT: 170 LBS | HEART RATE: 89 BPM | SYSTOLIC BLOOD PRESSURE: 102 MMHG | BODY MASS INDEX: 25.18 KG/M2

## 2021-03-04 DIAGNOSIS — J84.9 ILD (INTERSTITIAL LUNG DISEASE) (HCC): ICD-10-CM

## 2021-03-04 LAB
BASOPHILS NFR FLD: 1 %
BRONCH. LAVAGE DIFF.,BR: NORMAL
EOSINOPHIL NFR BRONCH MANUAL: 1 %
LYMPHOCYTES NFR BRONCH MANUAL: 41 %
MACROPHAGES NFR BRONCH MANUAL: 23 %
NEUTROPHILS NFR BRONCH MANUAL: 34 %

## 2021-03-04 PROCEDURE — 77030037088 HC TUBE ENDOTRACH ORAL NSL COVD-A: Performed by: ANESTHESIOLOGY

## 2021-03-04 PROCEDURE — 87116 MYCOBACTERIA CULTURE: CPT

## 2021-03-04 PROCEDURE — 86356 MONONUCLEAR CELL ANTIGEN: CPT

## 2021-03-04 PROCEDURE — 88112 CYTOPATH CELL ENHANCE TECH: CPT

## 2021-03-04 PROCEDURE — 74011000250 HC RX REV CODE- 250: Performed by: NURSE ANESTHETIST, CERTIFIED REGISTERED

## 2021-03-04 PROCEDURE — 87070 CULTURE OTHR SPECIMN AEROBIC: CPT

## 2021-03-04 PROCEDURE — 31624 DX BRONCHOSCOPE/LAVAGE: CPT | Performed by: INTERNAL MEDICINE

## 2021-03-04 PROCEDURE — 31628 BRONCHOSCOPY/LUNG BX EACH: CPT | Performed by: INTERNAL MEDICINE

## 2021-03-04 PROCEDURE — 88185 FLOWCYTOMETRY/TC ADD-ON: CPT

## 2021-03-04 PROCEDURE — 77030013670 HC FCPS BIOP DISP ENDOSC OCOA -B: Performed by: INTERNAL MEDICINE

## 2021-03-04 PROCEDURE — 88305 TISSUE EXAM BY PATHOLOGIST: CPT

## 2021-03-04 PROCEDURE — 76040000025: Performed by: INTERNAL MEDICINE

## 2021-03-04 PROCEDURE — 74011250636 HC RX REV CODE- 250/636: Performed by: ANESTHESIOLOGY

## 2021-03-04 PROCEDURE — 74011000250 HC RX REV CODE- 250: Performed by: INTERNAL MEDICINE

## 2021-03-04 PROCEDURE — 87798 DETECT AGENT NOS DNA AMP: CPT

## 2021-03-04 PROCEDURE — 77030039425 HC BLD LARYNG TRULITE DISP TELE -A: Performed by: ANESTHESIOLOGY

## 2021-03-04 PROCEDURE — 87102 FUNGUS ISOLATION CULTURE: CPT

## 2021-03-04 PROCEDURE — 2709999900 HC NON-CHARGEABLE SUPPLY: Performed by: INTERNAL MEDICINE

## 2021-03-04 PROCEDURE — 77030012699 HC VLV SUC CNTRL OCOA -A: Performed by: INTERNAL MEDICINE

## 2021-03-04 PROCEDURE — 88184 FLOWCYTOMETRY/ TC 1 MARKER: CPT

## 2021-03-04 PROCEDURE — 87486 CHLMYD PNEUM DNA AMP PROBE: CPT

## 2021-03-04 PROCEDURE — 89051 BODY FLUID CELL COUNT: CPT

## 2021-03-04 PROCEDURE — 76060000032 HC ANESTHESIA 0.5 TO 1 HR: Performed by: INTERNAL MEDICINE

## 2021-03-04 PROCEDURE — 87541 LEGION PNEUMO DNA AMP PROB: CPT

## 2021-03-04 PROCEDURE — 74011250636 HC RX REV CODE- 250/636: Performed by: NURSE ANESTHETIST, CERTIFIED REGISTERED

## 2021-03-04 RX ORDER — SUCCINYLCHOLINE CHLORIDE 20 MG/ML
INJECTION INTRAMUSCULAR; INTRAVENOUS AS NEEDED
Status: DISCONTINUED | OUTPATIENT
Start: 2021-03-04 | End: 2021-03-04 | Stop reason: HOSPADM

## 2021-03-04 RX ORDER — FENTANYL CITRATE 50 UG/ML
100 INJECTION, SOLUTION INTRAMUSCULAR; INTRAVENOUS ONCE
Status: DISCONTINUED | OUTPATIENT
Start: 2021-03-04 | End: 2021-03-04 | Stop reason: HOSPADM

## 2021-03-04 RX ORDER — HYDROCODONE BITARTRATE AND ACETAMINOPHEN 5; 325 MG/1; MG/1
2 TABLET ORAL AS NEEDED
Status: DISCONTINUED | OUTPATIENT
Start: 2021-03-04 | End: 2021-03-04 | Stop reason: HOSPADM

## 2021-03-04 RX ORDER — SODIUM CHLORIDE, SODIUM LACTATE, POTASSIUM CHLORIDE, CALCIUM CHLORIDE 600; 310; 30; 20 MG/100ML; MG/100ML; MG/100ML; MG/100ML
75 INJECTION, SOLUTION INTRAVENOUS CONTINUOUS
Status: DISCONTINUED | OUTPATIENT
Start: 2021-03-04 | End: 2021-03-04 | Stop reason: HOSPADM

## 2021-03-04 RX ORDER — OXYCODONE HYDROCHLORIDE 5 MG/1
5 TABLET ORAL
Status: DISCONTINUED | OUTPATIENT
Start: 2021-03-04 | End: 2021-03-04 | Stop reason: HOSPADM

## 2021-03-04 RX ORDER — MIDAZOLAM HYDROCHLORIDE 1 MG/ML
2 INJECTION, SOLUTION INTRAMUSCULAR; INTRAVENOUS
Status: DISCONTINUED | OUTPATIENT
Start: 2021-03-04 | End: 2021-03-04 | Stop reason: HOSPADM

## 2021-03-04 RX ORDER — FENTANYL CITRATE 50 UG/ML
INJECTION, SOLUTION INTRAMUSCULAR; INTRAVENOUS AS NEEDED
Status: DISCONTINUED | OUTPATIENT
Start: 2021-03-04 | End: 2021-03-04 | Stop reason: HOSPADM

## 2021-03-04 RX ORDER — LIDOCAINE HYDROCHLORIDE 20 MG/ML
JELLY TOPICAL ONCE
Status: COMPLETED | OUTPATIENT
Start: 2021-03-04 | End: 2021-03-04

## 2021-03-04 RX ORDER — LIDOCAINE HYDROCHLORIDE 40 MG/ML
SOLUTION TOPICAL ONCE
Status: COMPLETED | OUTPATIENT
Start: 2021-03-04 | End: 2021-03-04

## 2021-03-04 RX ORDER — ONDANSETRON 2 MG/ML
INJECTION INTRAMUSCULAR; INTRAVENOUS AS NEEDED
Status: DISCONTINUED | OUTPATIENT
Start: 2021-03-04 | End: 2021-03-04 | Stop reason: HOSPADM

## 2021-03-04 RX ORDER — DEXAMETHASONE SODIUM PHOSPHATE 4 MG/ML
INJECTION, SOLUTION INTRA-ARTICULAR; INTRALESIONAL; INTRAMUSCULAR; INTRAVENOUS; SOFT TISSUE AS NEEDED
Status: DISCONTINUED | OUTPATIENT
Start: 2021-03-04 | End: 2021-03-04 | Stop reason: HOSPADM

## 2021-03-04 RX ORDER — LIDOCAINE HYDROCHLORIDE 10 MG/ML
0.1 INJECTION INFILTRATION; PERINEURAL AS NEEDED
Status: DISCONTINUED | OUTPATIENT
Start: 2021-03-04 | End: 2021-03-04 | Stop reason: HOSPADM

## 2021-03-04 RX ORDER — ROCURONIUM BROMIDE 10 MG/ML
INJECTION, SOLUTION INTRAVENOUS AS NEEDED
Status: DISCONTINUED | OUTPATIENT
Start: 2021-03-04 | End: 2021-03-04 | Stop reason: HOSPADM

## 2021-03-04 RX ORDER — PROPOFOL 10 MG/ML
INJECTION, EMULSION INTRAVENOUS AS NEEDED
Status: DISCONTINUED | OUTPATIENT
Start: 2021-03-04 | End: 2021-03-04 | Stop reason: HOSPADM

## 2021-03-04 RX ORDER — LIDOCAINE HYDROCHLORIDE 20 MG/ML
INJECTION, SOLUTION EPIDURAL; INFILTRATION; INTRACAUDAL; PERINEURAL AS NEEDED
Status: DISCONTINUED | OUTPATIENT
Start: 2021-03-04 | End: 2021-03-04 | Stop reason: HOSPADM

## 2021-03-04 RX ORDER — HYDROMORPHONE HYDROCHLORIDE 1 MG/ML
0.5 INJECTION, SOLUTION INTRAMUSCULAR; INTRAVENOUS; SUBCUTANEOUS
Status: DISCONTINUED | OUTPATIENT
Start: 2021-03-04 | End: 2021-03-04 | Stop reason: HOSPADM

## 2021-03-04 RX ADMIN — FENTANYL CITRATE 50 MCG: 50 INJECTION INTRAMUSCULAR; INTRAVENOUS at 08:14

## 2021-03-04 RX ADMIN — SODIUM CHLORIDE, SODIUM LACTATE, POTASSIUM CHLORIDE, AND CALCIUM CHLORIDE 75 ML/HR: 600; 310; 30; 20 INJECTION, SOLUTION INTRAVENOUS at 07:00

## 2021-03-04 RX ADMIN — ONDANSETRON 4 MG: 2 INJECTION INTRAMUSCULAR; INTRAVENOUS at 08:31

## 2021-03-04 RX ADMIN — SODIUM CHLORIDE, SODIUM LACTATE, POTASSIUM CHLORIDE, AND CALCIUM CHLORIDE 75 ML/HR: 600; 310; 30; 20 INJECTION, SOLUTION INTRAVENOUS at 06:59

## 2021-03-04 RX ADMIN — DEXAMETHASONE SODIUM PHOSPHATE 4 MG: 4 INJECTION, SOLUTION INTRAMUSCULAR; INTRAVENOUS at 08:31

## 2021-03-04 RX ADMIN — LIDOCAINE HYDROCHLORIDE: 40 SOLUTION TOPICAL at 08:20

## 2021-03-04 RX ADMIN — SUCCINYLCHOLINE CHLORIDE 160 MG: 20 INJECTION, SOLUTION INTRAMUSCULAR; INTRAVENOUS at 08:14

## 2021-03-04 RX ADMIN — PROPOFOL 200 MG: 10 INJECTION, EMULSION INTRAVENOUS at 08:14

## 2021-03-04 RX ADMIN — PHENYLEPHRINE HYDROCHLORIDE 100 MCG: 10 INJECTION INTRAVENOUS at 08:17

## 2021-03-04 RX ADMIN — LIDOCAINE HYDROCHLORIDE 100 MG: 20 INJECTION, SOLUTION EPIDURAL; INFILTRATION; INTRACAUDAL; PERINEURAL at 08:14

## 2021-03-04 RX ADMIN — LIDOCAINE HYDROCHLORIDE: 20 JELLY TOPICAL at 08:20

## 2021-03-04 RX ADMIN — ROCURONIUM BROMIDE 5 MG: 10 INJECTION, SOLUTION INTRAVENOUS at 08:14

## 2021-03-04 RX ADMIN — PHENYLEPHRINE HYDROCHLORIDE 100 MCG: 10 INJECTION INTRAVENOUS at 08:19

## 2021-03-04 NOTE — INTERVAL H&P NOTE
Update History & Physical    The Patient's History and Physical of February 23,    was reviewed with the patient and I examined the patient. There was no change. The surgical site was confirmed by the patient and me. Plan:  The risk, benefits, expected outcome, and alternative to the recommended procedure have been discussed with the patient. Patient understands and wants to proceed with the procedure.     Electronically signed by Regino Soulier, MD on 3/4/2021 at 8:05 AM

## 2021-03-04 NOTE — PROGRESS NOTES
PNEUMOCYSTIS JIROVECI         Test shows as cancelled. Lab called and verified that lab was sent off as physician does want this test done. Lab stated that it was sent off and that it was sent to who they are contracted with. Cytology also ordered as seen on documentation.

## 2021-03-04 NOTE — PERIOP NOTES
9: 32 AM  Blanche HAUSER callled and notified that pt's fiance reports lower than normal O2 sat, sats now 88-92% on room air. MD ok with this, verbal sign out obtained at this time.

## 2021-03-04 NOTE — ANESTHESIA PREPROCEDURE EVALUATION
Relevant Problems   PERSONAL HX & FAMILY HX OF CANCER   (+) Multiple myeloma not having achieved remission (HCC)       Anesthetic History   No history of anesthetic complications            Review of Systems / Medical History  Patient summary reviewed and pertinent labs reviewed    Pulmonary                Comments: Interstitial lung disease   Neuro/Psych   Within defined limits           Cardiovascular                    Comments: Echo 2/2021 - normal   GI/Hepatic/Renal  Within defined limits              Endo/Other        Cancer (multiple myeloma) and anemia     Other Findings              Physical Exam    Airway  Mallampati: II  TM Distance: 4 - 6 cm  Neck ROM: normal range of motion   Mouth opening: Normal     Cardiovascular  Regular rate and rhythm,  S1 and S2 normal,  no murmur, click, rub, or gallop             Dental         Pulmonary        Wheezes:bilateral         Abdominal         Other Findings            Anesthetic Plan    ASA: 2  Anesthesia type: general          Induction: Intravenous  Anesthetic plan and risks discussed with: Patient and Spouse

## 2021-03-04 NOTE — PROCEDURES
PROCEDURE    Bronchoscopy with airway inspection /BAL /Transbronchial lung bx    INDICATION   Pulmonary infiltrates/ILD in the setting of MM    EQUIPMENT:  Olympus  Bronchoscope    ANESTHESIA    Please see anesthesiology medication record. Intubation and sedation performed by anesthesiology    IMAGING:  PET 2/18/21        AIRWAY INSPECTION    After obtaining informed consent, Olympus  Bronchoscopewas introduced into the trachea without complication.         RIGHT    LOCATION NORM/ABNORM DESCRIPTION   Larynx ETT    VOCAL CORDS ETT    TRACHEA NL    JAZMIN NL    RMSB NL    RUL NL    BI NL    RML NL    RLL NL    SUP SEGM RLL NL    MED BASAL NL    ANTERIOR BASAL NL    LATERAL BASAL NL    POSTERIOR BASAL NL               LEFT    LOCATION NORM/ABNORM DESCRIPTION   LMSB NL    RIK NL    LINGULA NL    LLL NL    SUPERIOR SEG LLL NL    CHRISTINA-MEDIAL LLL NL    LATERAL LLL NL    POSTERIOR LLL NL      The following samples were obtained:    BAL: RML - cell count, diff, routine culture, afb, fungus, cytology, flow, CD4:CD8, atypical pna, legionella, CMV PCR, PJP.     Transbronchial Lung Bx: RLL x 8      The procedure was completed without complication and the patient tolerated the procedure well.    EBL: <20cc    Recommendations:  Follow up final results before proceeding with stem cell transplantation.     Jagdish Price MD

## 2021-03-04 NOTE — ANESTHESIA POSTPROCEDURE EVALUATION
Procedure(s):  BRONCHOSCOPY  TRANSBRONCHIAL BIOPSY  BRONCHIAL ALVEOLAR LAVAGE. general    Anesthesia Post Evaluation      Multimodal analgesia: multimodal analgesia not used between 6 hours prior to anesthesia start to PACU discharge  Patient location during evaluation: PACU  Patient participation: complete - patient participated  Level of consciousness: awake and alert  Pain score: 0  Pain management: adequate  Airway patency: patent  Anesthetic complications: no  Cardiovascular status: acceptable and hemodynamically stable  Respiratory status: acceptable and spontaneous ventilation  Hydration status: acceptable  Post anesthesia nausea and vomiting:  none  Final Post Anesthesia Temperature Assessment:  Normothermia (36.0-37.5 degrees C)      INITIAL Post-op Vital signs:   Vitals Value Taken Time   /70 03/04/21 0923   Temp 36.6 °C (97.9 °F) 03/04/21 0918   Pulse 88 03/04/21 0923   Resp 14 03/04/21 0919   SpO2 93 % 03/04/21 0923   Vitals shown include unvalidated device data.

## 2021-03-04 NOTE — DISCHARGE INSTRUCTIONS
RESPIRATORY CARE - BRONCHOSCOPY - DISCHARGE INSTRUCTIONS      You received a lot of numbing medication for your throat and nose, and you also received medication to make you sleepy during your procedure. Because of this and because the bronchoscopy may have irritated your airways, we ask that you follow these directions:    1. Do not eat or drink for 2 hours after your procedure, until 1030 AM .   After that, you may have what you please. You may want to try some liquids first, because your throat may be a little sore. 2. Medication may cause drowsiness for several hours, therefore, do not drive or operate machinery for remainder of the day. No alcohol today. 3. You may cough up more mucus than usual and you may see some blood, but this is expected and should subside by the following day. 4. If severe throat irritation, coughing, or bleeding continue, call your doctor. 5.         If you run a fever greater than 102, call [facility] at [phone]. After general anesthesia or intravenous sedation, for 24 hours or while taking prescription Narcotics:  · Limit your activities  · A responsible adult needs to be with you for the next 24 hours  · Do not drive and operate hazardous machinery  · Do not make important personal or business decisions  · Do not drink alcoholic beverages  · If you have not urinated within 8 hours after discharge, and you are experiencing discomfort from urinary retention, please go to the nearest ED. · If you have sleep apnea and have a CPAP machine, please use it for all naps and sleeping. · Please use caution when taking narcotics and any of your home medications that may cause drowsiness. *  Please give a list of your current medications to your Primary Care Provider. *  Please update this list whenever your medications are discontinued, doses are      changed, or new medications (including over-the-counter products) are added.   *  Please carry medication information at all times in case of emergency situations. These are general instructions for a healthy lifestyle:  No smoking/ No tobacco products/ Avoid exposure to second hand smoke  Surgeon General's Warning:  Quitting smoking now greatly reduces serious risk to your health. Obesity, smoking, and sedentary lifestyle greatly increases your risk for illness  A healthy diet, regular physical exercise & weight monitoring are important for maintaining a healthy lifestyle    You may be retaining fluid if you have a history of heart failure or if you experience any of the following symptoms:  Weight gain of 3 pounds or more overnight or 5 pounds in a week, increased swelling in our hands or feet or shortness of breath while lying flat in bed. Please call your doctor as soon as you notice any of these symptoms; do not wait until your next office visit.

## 2021-03-05 ENCOUNTER — APPOINTMENT (OUTPATIENT)
Dept: INFUSION THERAPY | Age: 56
End: 2021-03-05

## 2021-03-06 ENCOUNTER — APPOINTMENT (OUTPATIENT)
Dept: INFUSION THERAPY | Age: 56
End: 2021-03-06

## 2021-03-06 LAB
BACTERIA SPEC CULT: NORMAL
GRAM STN SPEC: NORMAL
SERVICE CMNT-IMP: NORMAL

## 2021-03-07 ENCOUNTER — APPOINTMENT (OUTPATIENT)
Dept: INFUSION THERAPY | Age: 56
End: 2021-03-07

## 2021-03-07 LAB
CD3 CELLS # SPEC: 78 %
CD3+CD4+ CELLS # BLD: 48 %
CD4:CD8 RATIO, C48RBT: 1.85 RATIO
CD8, CD8BT: 26 %
SPECIMEN SOURCE: NORMAL

## 2021-03-08 ENCOUNTER — APPOINTMENT (OUTPATIENT)
Dept: INFUSION THERAPY | Age: 56
End: 2021-03-08

## 2021-03-08 LAB
C. PNEUMONAIE, CPPT: NOT DETECTED
CMV DNA # BAL NAA+PROBE: NOT DETECTED IU/ML
L PNEUMO DNA SPEC QL NAA+PROBE: NOT DETECTED
M. PNEUMONIAE, MPPT: NOT DETECTED
P JIROVECII DNA # BAL NAA+PROBE: NEGATIVE COPIES/ML
PAN LEGIONELLA: NOT DETECTED
SOURCE, PJPB1: NORMAL
SPECIMEN SOURCE: NORMAL
STEM CELL PANEL, XSTEMT: NORMAL

## 2021-03-11 LAB
FLOW CYTOMETRY, FBTC1: NORMAL
FLOW CYTOMETRY, FBTC1: NORMAL
SPECIMEN SOURCE: NORMAL
SPECIMEN SOURCE: NORMAL
TEST ORDERED:: NORMAL
TEST ORDERED:: NORMAL

## 2021-03-12 ENCOUNTER — HOSPITAL ENCOUNTER (OUTPATIENT)
Dept: LAB | Age: 56
Discharge: HOME | End: 2021-03-12
Payer: MEDICARE

## 2021-03-12 ENCOUNTER — PATIENT OUTREACH (OUTPATIENT)
Dept: CASE MANAGEMENT | Age: 56
End: 2021-03-12

## 2021-03-12 DIAGNOSIS — C90.00 MULTIPLE MYELOMA, REMISSION STATUS UNSPECIFIED (HCC): ICD-10-CM

## 2021-03-12 DIAGNOSIS — C90.00 MULTIPLE MYELOMA NOT HAVING ACHIEVED REMISSION (HCC): ICD-10-CM

## 2021-03-12 LAB
ALBUMIN SERPL-MCNC: 3 G/DL (ref 3.5–5)
ALBUMIN/GLOB SERPL: 1 {RATIO} (ref 1.2–3.5)
ALP SERPL-CCNC: 88 U/L (ref 50–136)
ALT SERPL-CCNC: 16 U/L (ref 12–65)
ANION GAP SERPL CALC-SCNC: 4 MMOL/L (ref 7–16)
AST SERPL-CCNC: 8 U/L (ref 15–37)
BASOPHILS # BLD: 0.2 K/UL (ref 0–0.2)
BASOPHILS NFR BLD: 2 % (ref 0–2)
BILIRUB SERPL-MCNC: 0.3 MG/DL (ref 0.2–1.1)
BUN SERPL-MCNC: 5 MG/DL (ref 6–23)
CALCIUM SERPL-MCNC: 8.6 MG/DL (ref 8.3–10.4)
CHLORIDE SERPL-SCNC: 104 MMOL/L (ref 98–107)
CO2 SERPL-SCNC: 33 MMOL/L (ref 21–32)
CREAT SERPL-MCNC: 0.5 MG/DL (ref 0.8–1.5)
DIFFERENTIAL METHOD BLD: ABNORMAL
EOSINOPHIL # BLD: 0.2 K/UL (ref 0–0.8)
EOSINOPHIL NFR BLD: 3 % (ref 0.5–7.8)
ERYTHROCYTE [DISTWIDTH] IN BLOOD BY AUTOMATED COUNT: 18.9 % (ref 11.9–14.6)
GLOBULIN SER CALC-MCNC: 3.1 G/DL (ref 2.3–3.5)
GLUCOSE SERPL-MCNC: 80 MG/DL (ref 65–100)
HCT VFR BLD AUTO: 33.2 % (ref 41.1–50.3)
HGB BLD-MCNC: 10 G/DL (ref 13.6–17.2)
IMM GRANULOCYTES # BLD AUTO: 0 K/UL (ref 0–0.5)
IMM GRANULOCYTES NFR BLD AUTO: 0 % (ref 0–5)
LYMPHOCYTES # BLD: 1.4 K/UL (ref 0.5–4.6)
LYMPHOCYTES NFR BLD: 21 % (ref 13–44)
MCH RBC QN AUTO: 24.7 PG (ref 26.1–32.9)
MCHC RBC AUTO-ENTMCNC: 30.1 G/DL (ref 31.4–35)
MCV RBC AUTO: 82 FL (ref 79.6–97.8)
MONOCYTES # BLD: 0.8 K/UL (ref 0.1–1.3)
MONOCYTES NFR BLD: 12 % (ref 4–12)
NEUTS SEG # BLD: 4.3 K/UL (ref 1.7–8.2)
NEUTS SEG NFR BLD: 62 % (ref 43–78)
NRBC # BLD: 0 K/UL (ref 0–0.2)
PLATELET # BLD AUTO: 341 K/UL (ref 150–450)
PMV BLD AUTO: 9.1 FL (ref 9.4–12.3)
POTASSIUM SERPL-SCNC: 3.3 MMOL/L (ref 3.5–5.1)
PROT SERPL-MCNC: 6.1 G/DL (ref 6.3–8.2)
RBC # BLD AUTO: 4.05 M/UL (ref 4.23–5.67)
SODIUM SERPL-SCNC: 141 MMOL/L (ref 136–145)
WBC # BLD AUTO: 6.9 K/UL (ref 4.3–11.1)

## 2021-03-12 PROCEDURE — 36415 COLL VENOUS BLD VENIPUNCTURE: CPT

## 2021-03-12 PROCEDURE — 83883 ASSAY NEPHELOMETRY NOT SPEC: CPT

## 2021-03-12 PROCEDURE — 85025 COMPLETE CBC W/AUTO DIFF WBC: CPT

## 2021-03-12 PROCEDURE — 86334 IMMUNOFIX E-PHORESIS SERUM: CPT

## 2021-03-12 PROCEDURE — 80053 COMPREHEN METABOLIC PANEL: CPT

## 2021-03-12 PROCEDURE — 86592 SYPHILIS TEST NON-TREP QUAL: CPT

## 2021-03-12 PROCEDURE — 86790 VIRUS ANTIBODY NOS: CPT

## 2021-03-12 PROCEDURE — 82784 ASSAY IGA/IGD/IGG/IGM EACH: CPT

## 2021-03-12 NOTE — PROGRESS NOTES
3/12 Pt came in for a follow up. He has been cleared by pulm. to proceed with his ASCT. We sent in a script for Vit D that he will take for 2 weeks. We will see him again for his MOB eval on 3/25 and then proceed with collection on 3/30.

## 2021-03-15 LAB
KAPPA LC FREE SER-MCNC: 213.14 MG/L (ref 3.3–19.4)
KAPPA LC FREE/LAMBDA FREE SER: 146.99 {RATIO} (ref 0.26–1.65)
LAMBDA LC FREE SERPL-MCNC: 1.45 MG/L (ref 5.71–26.3)

## 2021-03-16 LAB
ALBUMIN SERPL ELPH-MCNC: 3.04 G/DL (ref 3.2–5.6)
ALBUMIN/GLOB SERPL: 1.2 {RATIO}
ALPHA1 GLOB SERPL ELPH-MCNC: 0.35 G/DL (ref 0.1–0.4)
ALPHA2 GLOB SERPL ELPH-MCNC: 0.81 G/DL (ref 0.4–1.2)
B-GLOBULIN SERPL QL ELPH: 0.84 G/DL (ref 0.6–1.3)
GAMMA GLOB MFR SERPL ELPH: 0.56 G/DL (ref 0.5–1.6)
IGA SERPL-MCNC: 12 MG/DL (ref 85–499)
IGG SERPL-MCNC: 562 MG/DL (ref 610–1616)
IGM SERPL-MCNC: <10 MG/DL (ref 35–242)
M PROTEIN SERPL ELPH-MCNC: 0.32 G/DL
PROT PATTERN SERPL ELPH-IMP: ABNORMAL
PROT PATTERN SPEC IFE-IMP: ABNORMAL
PROT SERPL-MCNC: 5.6 G/DL (ref 6.3–8.2)

## 2021-03-17 LAB — STEM CELL PANEL, XSTEMT: NORMAL

## 2021-03-25 ENCOUNTER — HOSPITAL ENCOUNTER (OUTPATIENT)
Dept: LAB | Age: 56
Discharge: HOME | End: 2021-03-25
Payer: MEDICARE

## 2021-03-25 ENCOUNTER — PATIENT OUTREACH (OUTPATIENT)
Dept: CASE MANAGEMENT | Age: 56
End: 2021-03-25

## 2021-03-25 DIAGNOSIS — C90.00 MULTIPLE MYELOMA NOT HAVING ACHIEVED REMISSION (HCC): ICD-10-CM

## 2021-03-25 DIAGNOSIS — E55.9 VITAMIN D DEFICIENCY: ICD-10-CM

## 2021-03-25 DIAGNOSIS — C90.00 MULTIPLE MYELOMA, REMISSION STATUS UNSPECIFIED (HCC): ICD-10-CM

## 2021-03-25 LAB
25(OH)D3 SERPL-MCNC: 101.1 NG/ML (ref 30–100)
ALBUMIN SERPL-MCNC: 3.4 G/DL (ref 3.5–5)
ALBUMIN/GLOB SERPL: 1 {RATIO} (ref 1.2–3.5)
ALP SERPL-CCNC: 108 U/L (ref 50–136)
ALT SERPL-CCNC: 22 U/L (ref 12–65)
ANION GAP SERPL CALC-SCNC: 5 MMOL/L (ref 7–16)
AST SERPL-CCNC: 8 U/L (ref 15–37)
BASOPHILS # BLD: 0.1 K/UL (ref 0–0.2)
BASOPHILS NFR BLD: 1 % (ref 0–2)
BILIRUB SERPL-MCNC: 0.3 MG/DL (ref 0.2–1.1)
BUN SERPL-MCNC: 7 MG/DL (ref 6–23)
CALCIUM SERPL-MCNC: 9 MG/DL (ref 8.3–10.4)
CHLORIDE SERPL-SCNC: 104 MMOL/L (ref 98–107)
CO2 SERPL-SCNC: 29 MMOL/L (ref 21–32)
CREAT SERPL-MCNC: 0.7 MG/DL (ref 0.8–1.5)
DIFFERENTIAL METHOD BLD: ABNORMAL
EOSINOPHIL # BLD: 1.1 K/UL (ref 0–0.8)
EOSINOPHIL NFR BLD: 9 % (ref 0.5–7.8)
ERYTHROCYTE [DISTWIDTH] IN BLOOD BY AUTOMATED COUNT: 18.6 % (ref 11.9–14.6)
GLOBULIN SER CALC-MCNC: 3.5 G/DL (ref 2.3–3.5)
GLUCOSE SERPL-MCNC: 125 MG/DL (ref 65–100)
HCT VFR BLD AUTO: 38.5 %
HGB BLD-MCNC: 11.3 G/DL (ref 13.6–17.2)
IMM GRANULOCYTES # BLD AUTO: 0.1 K/UL (ref 0–0.5)
IMM GRANULOCYTES NFR BLD AUTO: 1 % (ref 0–5)
LYMPHOCYTES # BLD: 2.8 K/UL (ref 0.5–4.6)
LYMPHOCYTES NFR BLD: 23 % (ref 13–44)
MAGNESIUM SERPL-MCNC: 1.8 MG/DL (ref 1.8–2.4)
MCH RBC QN AUTO: 24.6 PG (ref 26.1–32.9)
MCHC RBC AUTO-ENTMCNC: 29.4 G/DL (ref 31.4–35)
MCV RBC AUTO: 83.7 FL (ref 79.6–97.8)
MONOCYTES # BLD: 1 K/UL (ref 0.1–1.3)
MONOCYTES NFR BLD: 9 % (ref 4–12)
NEUTS SEG # BLD: 6.7 K/UL (ref 1.7–8.2)
NEUTS SEG NFR BLD: 57 % (ref 43–78)
NRBC # BLD: 0 K/UL (ref 0–0.2)
PLATELET # BLD AUTO: 387 K/UL (ref 150–450)
PMV BLD AUTO: 8.8 FL (ref 9.4–12.3)
POTASSIUM SERPL-SCNC: 3.6 MMOL/L (ref 3.5–5.1)
PROT SERPL-MCNC: 6.9 G/DL (ref 6.3–8.2)
RBC # BLD AUTO: 4.6 M/UL (ref 4.23–5.6)
SODIUM SERPL-SCNC: 138 MMOL/L (ref 136–145)
WBC # BLD AUTO: 11.7 K/UL (ref 4.3–11.1)

## 2021-03-25 PROCEDURE — 83883 ASSAY NEPHELOMETRY NOT SPEC: CPT

## 2021-03-25 PROCEDURE — 82784 ASSAY IGA/IGD/IGG/IGM EACH: CPT

## 2021-03-25 PROCEDURE — 80053 COMPREHEN METABOLIC PANEL: CPT

## 2021-03-25 PROCEDURE — 36415 COLL VENOUS BLD VENIPUNCTURE: CPT

## 2021-03-25 PROCEDURE — 85025 COMPLETE CBC W/AUTO DIFF WBC: CPT

## 2021-03-25 PROCEDURE — 82306 VITAMIN D 25 HYDROXY: CPT

## 2021-03-25 PROCEDURE — 86334 IMMUNOFIX E-PHORESIS SERUM: CPT

## 2021-03-25 PROCEDURE — 83735 ASSAY OF MAGNESIUM: CPT

## 2021-03-25 NOTE — PROGRESS NOTES
3/25  Mobilization Evaluation  Patient met with Dr. Van Zhong to discuss results of pre-studies including chest xray, pulmonary function test, ekg, echocardiogram and stem cell infectious disease panel. Results noted in the chart. Patient to start Granix 10mcg/kg daily on 3/26/21 and continue until collection completed. Target goal 3 x 10^6kg   Mozobil to start 3/29/21 PRN per SOP    Tunneled Catheter placement on 3/29/21 with day 1 collection on 3/30/21. Expanded Suitability for PFT showing a DLCO of 31%  reason. Karnofsky ___70%____.

## 2021-03-26 ENCOUNTER — HOSPITAL ENCOUNTER (OUTPATIENT)
Dept: INFUSION THERAPY | Age: 56
Discharge: HOME | End: 2021-03-26
Payer: MEDICARE

## 2021-03-26 VITALS
SYSTOLIC BLOOD PRESSURE: 114 MMHG | OXYGEN SATURATION: 96 % | DIASTOLIC BLOOD PRESSURE: 68 MMHG | HEART RATE: 97 BPM | RESPIRATION RATE: 18 BRPM | TEMPERATURE: 98.4 F

## 2021-03-26 DIAGNOSIS — C90.00 MULTIPLE MYELOMA NOT HAVING ACHIEVED REMISSION (HCC): Primary | ICD-10-CM

## 2021-03-26 LAB
ALBUMIN SERPL ELPH-MCNC: 3.45 G/DL (ref 3.2–5.6)
ALBUMIN/GLOB SERPL: 1.2 {RATIO}
ALPHA1 GLOB SERPL ELPH-MCNC: 0.34 G/DL (ref 0.1–0.4)
ALPHA2 GLOB SERPL ELPH-MCNC: 0.89 G/DL (ref 0.4–1.2)
B-GLOBULIN SERPL QL ELPH: 0.92 G/DL (ref 0.6–1.3)
GAMMA GLOB MFR SERPL ELPH: 0.8 G/DL (ref 0.5–1.6)
IGA SERPL-MCNC: 11 MG/DL (ref 85–499)
IGG SERPL-MCNC: 709 MG/DL (ref 610–1616)
IGM SERPL-MCNC: <10 MG/DL (ref 35–242)
KAPPA LC FREE SER-MCNC: 285.3 MG/L (ref 3.3–19.4)
KAPPA LC FREE/LAMBDA FREE SER: 144.82 {RATIO} (ref 0.26–1.65)
LAMBDA LC FREE SERPL-MCNC: 1.97 MG/L (ref 5.71–26.3)
M PROTEIN SERPL ELPH-MCNC: 0.43 G/DL
PROT PATTERN SERPL ELPH-IMP: ABNORMAL
PROT PATTERN SPEC IFE-IMP: ABNORMAL
PROT SERPL-MCNC: 6.4 G/DL (ref 6.3–8.2)

## 2021-03-26 PROCEDURE — 96372 THER/PROPH/DIAG INJ SC/IM: CPT

## 2021-03-26 PROCEDURE — 74011250636 HC RX REV CODE- 250/636: Performed by: INTERNAL MEDICINE

## 2021-03-26 RX ORDER — SODIUM CHLORIDE 0.9 % (FLUSH) 0.9 %
10-40 SYRINGE (ML) INJECTION AS NEEDED
Status: ACTIVE | OUTPATIENT
Start: 2021-03-26 | End: 2021-03-26

## 2021-03-26 RX ADMIN — FILGRASTIM-SNDZ 900 MCG: 300 INJECTION, SOLUTION INTRAVENOUS; SUBCUTANEOUS at 07:43

## 2021-03-26 NOTE — PROGRESS NOTES
Mobilization Day 1  Labs needed at next visit- see orders. Next Appointment scheduled 3-27-21 at 0900. WBC/ANC= 11.7/6.7  Apheresis catheter placement scheduled for 3-29-21  New orders received-none. Pt arrived via w/c. Zarxio given SQ in right arm. Pt tolerated well. Pt aware of next appointment. Pt discharged via w/c.

## 2021-03-27 ENCOUNTER — HOSPITAL ENCOUNTER (OUTPATIENT)
Dept: INFUSION THERAPY | Age: 56
Discharge: HOME | End: 2021-03-27
Payer: MEDICARE

## 2021-03-27 VITALS
SYSTOLIC BLOOD PRESSURE: 118 MMHG | TEMPERATURE: 98.3 F | OXYGEN SATURATION: 94 % | WEIGHT: 174.4 LBS | RESPIRATION RATE: 18 BRPM | BODY MASS INDEX: 25.75 KG/M2 | HEART RATE: 109 BPM | DIASTOLIC BLOOD PRESSURE: 79 MMHG

## 2021-03-27 DIAGNOSIS — C90.00 MULTIPLE MYELOMA NOT HAVING ACHIEVED REMISSION (HCC): Primary | ICD-10-CM

## 2021-03-27 LAB
ANION GAP SERPL CALC-SCNC: 7 MMOL/L (ref 7–16)
BASOPHILS # BLD: 0.4 K/UL (ref 0–0.2)
BASOPHILS NFR BLD: 1 % (ref 0–2)
BUN SERPL-MCNC: 4 MG/DL (ref 6–23)
CALCIUM SERPL-MCNC: 9.3 MG/DL (ref 8.3–10.4)
CHLORIDE SERPL-SCNC: 105 MMOL/L (ref 98–107)
CO2 SERPL-SCNC: 28 MMOL/L (ref 21–32)
CREAT SERPL-MCNC: 0.7 MG/DL (ref 0.8–1.5)
DIFFERENTIAL METHOD BLD: ABNORMAL
EOSINOPHIL # BLD: 1.2 K/UL (ref 0–0.8)
EOSINOPHIL NFR BLD: 3 % (ref 0.5–7.8)
ERYTHROCYTE [DISTWIDTH] IN BLOOD BY AUTOMATED COUNT: 18.7 % (ref 11.9–14.6)
GLUCOSE SERPL-MCNC: 110 MG/DL (ref 65–100)
HCT VFR BLD AUTO: 37.5 %
HGB BLD-MCNC: 11.3 G/DL (ref 13.6–17.2)
IMM GRANULOCYTES # BLD AUTO: 0.8 K/UL (ref 0–0.5)
IMM GRANULOCYTES NFR BLD AUTO: 2 % (ref 0–5)
LYMPHOCYTES # BLD: 3.5 K/UL (ref 0.5–4.6)
LYMPHOCYTES NFR BLD: 9 % (ref 13–44)
MAGNESIUM SERPL-MCNC: 1.8 MG/DL (ref 1.8–2.4)
MCH RBC QN AUTO: 25.2 PG (ref 26.1–32.9)
MCHC RBC AUTO-ENTMCNC: 30.1 G/DL (ref 31.4–35)
MCV RBC AUTO: 83.5 FL (ref 79.6–97.8)
MONOCYTES # BLD: 2.3 K/UL (ref 0.1–1.3)
MONOCYTES NFR BLD: 6 % (ref 4–12)
NEUTS SEG # BLD: 30.5 K/UL (ref 1.7–8.2)
NEUTS SEG NFR BLD: 79 % (ref 43–78)
NRBC # BLD: 0 K/UL (ref 0–0.2)
PLATELET # BLD AUTO: 382 K/UL (ref 150–450)
PLATELET COMMENTS,PCOM: ADEQUATE
PMV BLD AUTO: 8.8 FL (ref 9.4–12.3)
POTASSIUM SERPL-SCNC: 3.7 MMOL/L (ref 3.5–5.1)
RBC # BLD AUTO: 4.49 M/UL (ref 4.23–5.6)
RBC MORPH BLD: ABNORMAL
SODIUM SERPL-SCNC: 140 MMOL/L (ref 136–145)
WBC # BLD AUTO: 38.7 K/UL (ref 4.3–11.1)
WBC MORPH BLD: ABNORMAL

## 2021-03-27 PROCEDURE — 96372 THER/PROPH/DIAG INJ SC/IM: CPT

## 2021-03-27 PROCEDURE — 85025 COMPLETE CBC W/AUTO DIFF WBC: CPT

## 2021-03-27 PROCEDURE — 74011250636 HC RX REV CODE- 250/636: Performed by: INTERNAL MEDICINE

## 2021-03-27 PROCEDURE — 83735 ASSAY OF MAGNESIUM: CPT

## 2021-03-27 PROCEDURE — 80048 BASIC METABOLIC PNL TOTAL CA: CPT

## 2021-03-27 RX ADMIN — FILGRASTIM-SNDZ 780 MCG: 480 INJECTION, SOLUTION INTRAVENOUS; SUBCUTANEOUS at 11:58

## 2021-03-27 NOTE — PROGRESS NOTES
Mobilization Day 2. Labs needed at next visit see orders. Next Appointment scheduled [email or web address]. WBC/ANC= 38.7/30.5. Apheresis catheter placement scheduled for 03/29/21. New orders received- none. Pt arrived to infusion via w/c. Zarxio subq given in right arm. Pt aware of next infusion appt, discharged via w/c in stable condition.

## 2021-03-28 ENCOUNTER — HOSPITAL ENCOUNTER (OUTPATIENT)
Dept: INFUSION THERAPY | Age: 56
Discharge: HOME | End: 2021-03-28
Payer: MEDICARE

## 2021-03-28 VITALS
BODY MASS INDEX: 25.67 KG/M2 | TEMPERATURE: 98.3 F | RESPIRATION RATE: 18 BRPM | DIASTOLIC BLOOD PRESSURE: 84 MMHG | HEART RATE: 126 BPM | SYSTOLIC BLOOD PRESSURE: 136 MMHG | WEIGHT: 173.8 LBS | OXYGEN SATURATION: 90 %

## 2021-03-28 DIAGNOSIS — C90.00 MULTIPLE MYELOMA NOT HAVING ACHIEVED REMISSION (HCC): Primary | ICD-10-CM

## 2021-03-28 LAB
ANION GAP SERPL CALC-SCNC: 11 MMOL/L (ref 7–16)
APTT PPP: 30 SEC (ref 24.1–35.1)
BUN SERPL-MCNC: 4 MG/DL (ref 6–23)
CALCIUM SERPL-MCNC: 9.6 MG/DL (ref 8.3–10.4)
CHLORIDE SERPL-SCNC: 101 MMOL/L (ref 98–107)
CO2 SERPL-SCNC: 26 MMOL/L (ref 21–32)
CREAT SERPL-MCNC: 0.9 MG/DL (ref 0.8–1.5)
DIFFERENTIAL METHOD BLD: ABNORMAL
ERYTHROCYTE [DISTWIDTH] IN BLOOD BY AUTOMATED COUNT: 18.8 % (ref 11.9–14.6)
GLUCOSE SERPL-MCNC: 172 MG/DL (ref 65–100)
HCT VFR BLD AUTO: 39.3 %
HGB BLD-MCNC: 11.8 G/DL (ref 13.6–17.2)
INR PPP: 1
LYMPHOCYTES # BLD: 1.7 K/UL (ref 0.5–4.6)
LYMPHOCYTES NFR BLD MANUAL: 3 % (ref 16–44)
MAGNESIUM SERPL-MCNC: 1.9 MG/DL (ref 1.8–2.4)
MCH RBC QN AUTO: 25.5 PG (ref 26.1–32.9)
MCHC RBC AUTO-ENTMCNC: 30 G/DL (ref 31.4–35)
MCV RBC AUTO: 84.9 FL (ref 79.6–97.8)
MONOCYTES # BLD: 2.2 K/UL (ref 0.1–1.3)
MONOCYTES NFR BLD MANUAL: 4 % (ref 3–9)
NEUTS BAND NFR BLD MANUAL: 9 % (ref 0–6)
NEUTS SEG # BLD: 52.1 K/UL (ref 1.7–8.2)
NEUTS SEG NFR BLD MANUAL: 84 % (ref 47–75)
NRBC # BLD: 0 K/UL (ref 0–0.2)
PLATELET # BLD AUTO: 437 K/UL (ref 150–450)
PLATELET COMMENTS,PCOM: ADEQUATE
PMV BLD AUTO: 9.7 FL (ref 9.4–12.3)
POTASSIUM SERPL-SCNC: 4.1 MMOL/L (ref 3.5–5.1)
PROTHROMBIN TIME: 13.2 SEC (ref 12.5–14.7)
RBC # BLD AUTO: 4.63 M/UL (ref 4.23–5.6)
RBC MORPH BLD: ABNORMAL
SODIUM SERPL-SCNC: 138 MMOL/L (ref 136–145)
WBC # BLD AUTO: 56 K/UL (ref 4.3–11.1)
WBC MORPH BLD: ABNORMAL

## 2021-03-28 PROCEDURE — 96372 THER/PROPH/DIAG INJ SC/IM: CPT

## 2021-03-28 PROCEDURE — 85610 PROTHROMBIN TIME: CPT

## 2021-03-28 PROCEDURE — 80048 BASIC METABOLIC PNL TOTAL CA: CPT

## 2021-03-28 PROCEDURE — 85025 COMPLETE CBC W/AUTO DIFF WBC: CPT

## 2021-03-28 PROCEDURE — 74011250636 HC RX REV CODE- 250/636: Performed by: INTERNAL MEDICINE

## 2021-03-28 PROCEDURE — 85730 THROMBOPLASTIN TIME PARTIAL: CPT

## 2021-03-28 PROCEDURE — 83735 ASSAY OF MAGNESIUM: CPT

## 2021-03-28 RX ORDER — SODIUM CHLORIDE 0.9 % (FLUSH) 0.9 %
10-40 SYRINGE (ML) INJECTION AS NEEDED
Status: ACTIVE | OUTPATIENT
Start: 2021-03-28 | End: 2021-03-28

## 2021-03-28 RX ADMIN — FILGRASTIM-SNDZ 780 MCG: 480 INJECTION, SOLUTION INTRAVENOUS; SUBCUTANEOUS at 08:47

## 2021-03-28 NOTE — PROGRESS NOTES
Mobilization Day 3. Labs needed at next visit- see tx plan. Next Appointment scheduled tomorrow at 8 am.  WBC/ANC= 56/52. 1. Apheresis catheter placement scheduled for tomorrow. New orders received- none. Issues-   Pt arrived via w/c. Labs drawn peripherally, granix given. Pt tolerated well. Discharged via w/c. No distress noted.

## 2021-03-29 ENCOUNTER — HOSPITAL ENCOUNTER (OUTPATIENT)
Dept: INFUSION THERAPY | Age: 56
End: 2021-03-29

## 2021-03-29 ENCOUNTER — TELEPHONE (OUTPATIENT)
Dept: CASE MANAGEMENT | Age: 56
End: 2021-03-29

## 2021-03-29 ENCOUNTER — HOSPITAL ENCOUNTER (OUTPATIENT)
Dept: INFUSION THERAPY | Age: 56
Discharge: HOME | End: 2021-03-29
Payer: MEDICARE

## 2021-03-29 ENCOUNTER — HOSPITAL ENCOUNTER (OUTPATIENT)
Dept: INTERVENTIONAL RADIOLOGY/VASCULAR | Age: 56
Discharge: HOME | End: 2021-03-29
Attending: INTERNAL MEDICINE
Payer: MEDICARE

## 2021-03-29 VITALS
HEART RATE: 103 BPM | TEMPERATURE: 98.3 F | BODY MASS INDEX: 25.61 KG/M2 | OXYGEN SATURATION: 91 % | RESPIRATION RATE: 18 BRPM | DIASTOLIC BLOOD PRESSURE: 77 MMHG | WEIGHT: 173.4 LBS | SYSTOLIC BLOOD PRESSURE: 110 MMHG

## 2021-03-29 VITALS
OXYGEN SATURATION: 92 % | DIASTOLIC BLOOD PRESSURE: 77 MMHG | HEART RATE: 102 BPM | HEIGHT: 69 IN | SYSTOLIC BLOOD PRESSURE: 123 MMHG | TEMPERATURE: 97.9 F | WEIGHT: 173 LBS | BODY MASS INDEX: 25.62 KG/M2 | RESPIRATION RATE: 16 BRPM

## 2021-03-29 DIAGNOSIS — C90.00 MULTIPLE MYELOMA NOT HAVING ACHIEVED REMISSION (HCC): Primary | ICD-10-CM

## 2021-03-29 DIAGNOSIS — C90.00 MULTIPLE MYELOMA, REMISSION STATUS UNSPECIFIED (HCC): ICD-10-CM

## 2021-03-29 LAB
ALBUMIN SERPL-MCNC: 3.6 G/DL (ref 3.5–5)
ALBUMIN/GLOB SERPL: 1.1 {RATIO} (ref 1.2–3.5)
ALP SERPL-CCNC: 144 U/L (ref 50–136)
ALT SERPL-CCNC: 17 U/L (ref 12–65)
ANION GAP SERPL CALC-SCNC: 7 MMOL/L (ref 7–16)
AST SERPL-CCNC: 7 U/L (ref 15–37)
BASOPHILS # BLD: 0 K/UL (ref 0–0.2)
BASOPHILS NFR BLD: 0 % (ref 0–2)
BILIRUB SERPL-MCNC: 0.3 MG/DL (ref 0.2–1.1)
BUN SERPL-MCNC: 9 MG/DL (ref 6–23)
CALCIUM SERPL-MCNC: 9 MG/DL (ref 8.3–10.4)
CD34,XCD34T: NORMAL
CHLORIDE SERPL-SCNC: 104 MMOL/L (ref 98–107)
CO2 SERPL-SCNC: 28 MMOL/L (ref 21–32)
CREAT SERPL-MCNC: 0.8 MG/DL (ref 0.8–1.5)
DIFFERENTIAL METHOD BLD: ABNORMAL
EOSINOPHIL # BLD: 0 K/UL (ref 0–0.8)
EOSINOPHIL NFR BLD: 0 % (ref 0.5–7.8)
ERYTHROCYTE [DISTWIDTH] IN BLOOD BY AUTOMATED COUNT: 18.8 % (ref 11.9–14.6)
GLOBULIN SER CALC-MCNC: 3.4 G/DL (ref 2.3–3.5)
GLUCOSE SERPL-MCNC: 97 MG/DL (ref 65–100)
HCT VFR BLD AUTO: 34.8 %
HGB BLD-MCNC: 10.8 G/DL (ref 13.6–17.2)
IMM GRANULOCYTES # BLD AUTO: 4.5 K/UL (ref 0–0.5)
IMM GRANULOCYTES NFR BLD AUTO: 7 % (ref 0–5)
LYMPHOCYTES # BLD: 3.2 K/UL (ref 0.5–4.6)
LYMPHOCYTES NFR BLD: 5 % (ref 13–44)
MAGNESIUM SERPL-MCNC: 1.8 MG/DL (ref 1.8–2.4)
MCH RBC QN AUTO: 25.5 PG (ref 26.1–32.9)
MCHC RBC AUTO-ENTMCNC: 31 G/DL (ref 31.4–35)
MCV RBC AUTO: 82.1 FL (ref 79.6–97.8)
MONOCYTES # BLD: 4.5 K/UL (ref 0.1–1.3)
MONOCYTES NFR BLD: 7 % (ref 4–12)
NEUTS SEG # BLD: 52.3 K/UL (ref 1.7–8.2)
NEUTS SEG NFR BLD: 81 % (ref 43–78)
NRBC # BLD: 0 K/UL (ref 0–0.2)
PHOSPHATE SERPL-MCNC: 2.6 MG/DL (ref 2.5–4.5)
PLATELET # BLD AUTO: 373 K/UL (ref 150–450)
PLATELET COMMENTS,PCOM: ADEQUATE
PMV BLD AUTO: 9 FL (ref 9.4–12.3)
POTASSIUM SERPL-SCNC: 3.6 MMOL/L (ref 3.5–5.1)
PROT SERPL-MCNC: 7 G/DL (ref 6.3–8.2)
RBC # BLD AUTO: 4.24 M/UL (ref 4.23–5.6)
RBC MORPH BLD: ABNORMAL
RBC MORPH BLD: ABNORMAL
SODIUM SERPL-SCNC: 139 MMOL/L (ref 136–145)
WBC # BLD AUTO: 64.5 K/UL (ref 4.3–11.1)
WBC MORPH BLD: ABNORMAL

## 2021-03-29 PROCEDURE — 96372 THER/PROPH/DIAG INJ SC/IM: CPT

## 2021-03-29 PROCEDURE — 74011000250 HC RX REV CODE- 250: Performed by: RADIOLOGY

## 2021-03-29 PROCEDURE — C1894 INTRO/SHEATH, NON-LASER: HCPCS

## 2021-03-29 PROCEDURE — 84100 ASSAY OF PHOSPHORUS: CPT

## 2021-03-29 PROCEDURE — 80053 COMPREHEN METABOLIC PANEL: CPT

## 2021-03-29 PROCEDURE — 77030002916 HC SUT ETHLN J&J -A

## 2021-03-29 PROCEDURE — 86367 STEM CELLS TOTAL COUNT: CPT

## 2021-03-29 PROCEDURE — 83735 ASSAY OF MAGNESIUM: CPT

## 2021-03-29 PROCEDURE — 99152 MOD SED SAME PHYS/QHP 5/>YRS: CPT

## 2021-03-29 PROCEDURE — 74011250636 HC RX REV CODE- 250/636: Performed by: INTERNAL MEDICINE

## 2021-03-29 PROCEDURE — C1769 GUIDE WIRE: HCPCS

## 2021-03-29 PROCEDURE — 74011250636 HC RX REV CODE- 250/636: Performed by: RADIOLOGY

## 2021-03-29 PROCEDURE — 77030031131 HC SUT MXN P COVD -B

## 2021-03-29 PROCEDURE — 85025 COMPLETE CBC W/AUTO DIFF WBC: CPT

## 2021-03-29 RX ORDER — MIDAZOLAM HYDROCHLORIDE 1 MG/ML
.25-2 INJECTION, SOLUTION INTRAMUSCULAR; INTRAVENOUS
Status: DISCONTINUED | OUTPATIENT
Start: 2021-03-29 | End: 2021-03-29 | Stop reason: ALTCHOICE

## 2021-03-29 RX ORDER — SODIUM CHLORIDE 0.9 % (FLUSH) 0.9 %
10-40 SYRINGE (ML) INJECTION AS NEEDED
Status: ACTIVE | OUTPATIENT
Start: 2021-03-29 | End: 2021-03-29

## 2021-03-29 RX ORDER — LIDOCAINE HYDROCHLORIDE AND EPINEPHRINE 20; 10 MG/ML; UG/ML
200-400 INJECTION, SOLUTION INFILTRATION; PERINEURAL
Status: DISCONTINUED | OUTPATIENT
Start: 2021-03-29 | End: 2021-03-29 | Stop reason: ALTCHOICE

## 2021-03-29 RX ORDER — FENTANYL CITRATE 50 UG/ML
25-100 INJECTION, SOLUTION INTRAMUSCULAR; INTRAVENOUS
Status: DISCONTINUED | OUTPATIENT
Start: 2021-03-29 | End: 2021-03-29 | Stop reason: ALTCHOICE

## 2021-03-29 RX ORDER — SODIUM CHLORIDE 9 MG/ML
25 INJECTION, SOLUTION INTRAVENOUS ONCE
Status: COMPLETED | OUTPATIENT
Start: 2021-03-29 | End: 2021-03-29

## 2021-03-29 RX ORDER — HEPARIN SODIUM 1000 [USP'U]/ML
1000-8000 INJECTION, SOLUTION INTRAVENOUS; SUBCUTANEOUS
Status: DISCONTINUED | OUTPATIENT
Start: 2021-03-29 | End: 2021-03-29 | Stop reason: ALTCHOICE

## 2021-03-29 RX ADMIN — FILGRASTIM-SNDZ 780 MCG: 480 INJECTION, SOLUTION INTRAVENOUS; SUBCUTANEOUS at 08:28

## 2021-03-29 RX ADMIN — FENTANYL CITRATE 50 MCG: 50 INJECTION, SOLUTION INTRAMUSCULAR; INTRAVENOUS at 12:52

## 2021-03-29 RX ADMIN — MIDAZOLAM 1 MG: 1 INJECTION INTRAMUSCULAR; INTRAVENOUS at 12:52

## 2021-03-29 RX ADMIN — SODIUM CHLORIDE 25 ML/HR: 900 INJECTION, SOLUTION INTRAVENOUS at 12:46

## 2021-03-29 RX ADMIN — LIDOCAINE HYDROCHLORIDE,EPINEPHRINE BITARTRATE 60 MG: 20; .01 INJECTION, SOLUTION INFILTRATION; PERINEURAL at 13:03

## 2021-03-29 RX ADMIN — FENTANYL CITRATE 25 MCG: 50 INJECTION, SOLUTION INTRAMUSCULAR; INTRAVENOUS at 13:02

## 2021-03-29 RX ADMIN — MIDAZOLAM 0.5 MG: 1 INJECTION INTRAMUSCULAR; INTRAVENOUS at 13:02

## 2021-03-29 RX ADMIN — LIDOCAINE HYDROCHLORIDE,EPINEPHRINE BITARTRATE 200 MG: 20; .01 INJECTION, SOLUTION INFILTRATION; PERINEURAL at 13:04

## 2021-03-29 NOTE — PROGRESS NOTES
Recovery period without difficulty. Pt alert and oriented and denies pain. Dressing is clean, dry, and intact. Reviewed discharge instructions with patient and spouse, both verbalized understanding.

## 2021-03-29 NOTE — TELEPHONE ENCOUNTER
3/29  Pt wife called this RN and stated that the pt did not get his apheresis line placed today. Pt thought he would be \"put to sleep\" to get this line placed but was only given a local. Pt stated that he could \"feel the doctor cutting\". I explained that he would need to get this line placed in order to proceed with his stem cell collection. IR then called and said they could get the pt back in 3/31 to give him anesthesia to place the line. Talked with Dr. Bora Sifuentes regarding this issue. Dr. Bora Sifuentes said we could hold off on pt Mozobil for 3/29 and he can come 3/30 to get his granix, get a 16gauge IV placed in infusion then proceed with collection and then mozobil that night. IF infusion is unable to get IV access, Dr. Bora Sifuentes said that IR can place the tunneled cath on Wednesday 3/31 THEN proceed with collection.

## 2021-03-29 NOTE — PROGRESS NOTES
Peripheral CD 34 count: 10  Predictive CD 34 @ 20 liters is 0.8 x 106/kg  Predictive CD 34 @ 30 liters is 1.2 x 106/kg  Results received from Dle Ferris at 1103  Mozobil required yes

## 2021-03-29 NOTE — PROGRESS NOTES
TRANSFER - OUT REPORT:    Verbal report given to Jesus Manuel Shipman RN (name) on Jodie Cr  being transferred to IR recovery (unit). Patient requesting Anesthesia for his procedure      Report consisted of patients Situation, Background, Assessment and   Recommendations(SBAR). Information from the following report(s) Procedure Summary and MAR was reviewed with the receiving nurse. Opportunity for questions and clarification was provided. Conscious Sedation:   75 Mcg of Fentanyl administered  1.5 Mg of Versed administered    Pt tolerated procedure well.      Visit Vitals  /77   Pulse (!) 102   Temp 97.9 °F (36.6 °C)   Resp 14   Ht 5' 9\" (1.753 m)   Wt 78.5 kg (173 lb)   SpO2 98%   BMI 25.55 kg/m²     Past Medical History:   Diagnosis Date    Cancer [facility])     multiple myeloma    Headache      Peripheral IV 03/29/21 Right Antecubital (Active)

## 2021-03-29 NOTE — H&P
Department of Interventional Radiology  [phone]    History and Physical    Patient:  Sabi King MRN:  [id number]  SSN:  [id number]    YOB: 1965  Age:  48 y.o. Sex:  male      Primary Care Provider:  Deyvi Marrero NP  Referring Physician:  Erin Albarran MD    Subjective:     Chief Complaint: multiple myeloma    History of the Present Illness: The patient is a 48 y.o. male who presents for tunneled pheresis catheter placement. NPO. Past Medical History:   Diagnosis Date    Cancer [facility])     multiple myeloma    Headache      Past Surgical History:   Procedure Laterality Date    HX KNEE REPLACEMENT Left 06/2000    HX KYPHOPLASTY  08/2020    HX OTHER SURGICAL      bone marrow biopsy        Review of Systems:    Pertinent items are noted in the History of Present Illness. Prior to Admission medications    Medication Sig Start Date End Date Taking? Authorizing Provider   zolpidem (AMBIEN) 5 mg tablet Take 1 Tab by mouth nightly as needed for Sleep. Max Daily Amount: 5 mg. 3/25/21   Erin Albarran MD   cyclobenzaprine (FLEXERIL) 10 mg tablet Take 1 Tab by mouth as needed for Muscle Spasm(s). 3/25/21   Erin Albarran MD   ondansetron hcl [facility]) 8 mg tablet  2/23/21   Provider, Historical   melatonin 3 mg tablet TAKE 1 TABLET BY MOUTH EVERY NIGHT AS NEEDED FOR SLEEP 1/31/21   Provider, Historical    mg capsule  1/31/21   Provider, Historical   dexAMETHasone (DECADRON) 4 mg tablet Take 20 mg 2 times weekly with velcade 3/5/21   Provider, Historical   potassium chloride (K-DUR, KLOR-CON) 20 mEq tablet Take 1 Tab by mouth two (2) times a day. 2/12/21   Sincere Gordon NP   acyclovir (ZOVIRAX) 400 mg tablet 400 mg daily. 1/14/21   Provider, Historical   cholecalciferol (VITAMIN D3) (1000 Units /25 mcg) tablet Take 1,000 Units by mouth daily. 9/18/20 9/18/21  Provider, Historical   LORazepam (ATIVAN) 0.5 mg tablet as needed.  10/20/20   Provider, Historical   montelukast (SINGULAIR) 10 mg tablet nightly. 20   Provider, Historical   morphine CR (MS CONTIN) 60 mg CR tablet every twelve (12) hours. 1/15/21   Provider, Historical   oxyCODONE-acetaminophen (PERCOCET 10)  mg per tablet every four (4) hours as needed. 21   Provider, Historical   pantoprazole (PROTONIX) 40 mg tablet daily. 20   Provider, Historical   aspirin (ASPIRIN) 325 mg tablet Take 1 Tab by mouth daily. Per patient instructed to hold for 7 days 20   Provider, Historical        No Known Allergies    No family history on file.   Social History     Tobacco Use    Smoking status: Former Smoker     Packs/day: 1.00     Years: 4.00     Pack years: 4.00     Quit date:      Years since quittin.2    Smokeless tobacco: Never Used   Substance Use Topics    Alcohol use: Not Currently     Frequency: Never     Binge frequency: Never        Objective:       Physical Examination:    Vitals:    21 1210   BP: 122/82   Pulse: 99   Resp: 16   Temp: 97.9 °F (36.6 °C)   SpO2: 99%   Weight: 78.5 kg (173 lb)   Height: 5' 9\" (1.753 m)       Pain Assessment  Pain Intensity 1: 7 (21 1206)  Pain Location 1: Back, Foot     Patient Stated Pain Goal: 0      HEART: regular rate and rhythm  LUNG: clear to auscultation bilaterally  ABDOMEN: normal findings: soft, non-tender  EXTREMITIES: normal strength, tone, and muscle mass    Laboratory:     Lab Results   Component Value Date/Time    Sodium 139 2021 07:49 AM    Sodium 138 2021 08:10 AM    Potassium 3.6 2021 07:49 AM    Potassium 4.1 2021 08:10 AM    Chloride 104 2021 07:49 AM    Chloride 101 2021 08:10 AM    CO2 28 2021 07:49 AM    CO2 26 2021 08:10 AM    Anion gap 7 2021 07:49 AM    Anion gap 11 2021 08:10 AM    Glucose 97 2021 07:49 AM    Glucose 172 (H) 2021 08:10 AM    BUN 9 2021 07:49 AM    BUN 4 (L) 2021 08:10 AM    Creatinine 0.80 03/29/2021 07:49 AM    Creatinine 0.90 03/28/2021 08:10 AM    GFR est AA >60 03/29/2021 07:49 AM    GFR est AA >60 03/28/2021 08:10 AM    GFR est non-AA >60 03/29/2021 07:49 AM    GFR est non-AA >60 03/28/2021 08:10 AM    Calcium 9.0 03/29/2021 07:49 AM    Calcium 9.6 03/28/2021 08:10 AM    Magnesium 1.8 03/29/2021 07:49 AM    Magnesium 1.9 03/28/2021 08:10 AM    Phosphorus 2.6 03/29/2021 07:49 AM    Albumin 3.6 03/29/2021 07:49 AM    Albumin 3.4 (L) 03/25/2021 03:21 PM    Protein, total 7.0 03/29/2021 07:49 AM    Protein, total 6.9 03/25/2021 03:21 PM    Protein, total 6.4 03/25/2021 03:21 PM    Globulin 3.4 03/29/2021 07:49 AM    Globulin 3.5 03/25/2021 03:21 PM    A-G Ratio 1.1 (L) 03/29/2021 07:49 AM    A-G Ratio 1.0 (L) 03/25/2021 03:21 PM    A-G Ratio 1.2 03/25/2021 03:21 PM    ALT (SGPT) 17 03/29/2021 07:49 AM    ALT (SGPT) 22 03/25/2021 03:21 PM     Lab Results   Component Value Date/Time    WBC 64.5 (HH) 03/29/2021 07:49 AM    WBC 56.0 (HH) 03/28/2021 08:10 AM    HGB 10.8 (L) 03/29/2021 07:49 AM    HGB 11.8 (L) 03/28/2021 08:10 AM    HCT 34.8 03/29/2021 07:49 AM    HCT 39.3 03/28/2021 08:10 AM    PLATELET 891 84/55/9660 07:49 AM    PLATELET 240 60/49/1780 08:10 AM     Lab Results   Component Value Date/Time    aPTT 30.0 03/28/2021 08:10 AM    Prothrombin time 13.2 03/28/2021 08:10 AM    INR 1.0 03/28/2021 08:10 AM       Assessment:     Multiple myeloma        Plan:     Planned Procedure: Tunneled pheresis catheter placement    Risks, benefits, and alternatives reviewed with patient and he agrees to proceed with the procedure.       Signed By: Nikkie Maloney PA-C     March 29, 2021

## 2021-03-29 NOTE — DISCHARGE INSTRUCTIONS
[address]  Department of Interventional Radiology  [facility]  [phone] Office  [phone] Fax    Tunneled or Non Tunneled Catheter Discharge Instructions    General Information:   A catheter, either tunneled (permanent) or non-tunneled (temporary) catheter was inserted into your neck today for the purpose of Cancer treatment (apheresis) or dialysis. Your catheter will be used for the length of your apheresis, or until you get a fistula placed in surgery for dialysis. After this time, your catheter may be removed. You may return to our department for the catheter removal.  A non-tunneled catheter will exit at the neck. There will be three ports, two of which will be used for dialysis or apheresis. The one smaller port in the middle can be used like a regular IV. It ideally is used only for about two weeks. A tunneled catheter will exit lower down on the chest wall, and can be used for a longer period of time. There is no IV port on these. The tunneled catheter is used only for dialysis. In case of emergencies only can drugs be given through these catheters and only with written permission from your doctor. Home Care Instructions: You can resume your regular diet. Do not drink alcohol, drive, or make any important legal decisions in the next 24 hours. Watch the site carefully for signs of infection, like fever, drainage, redness, and/or swelling. Your catheter should be \"packed\" with heparin after each use or at least once a week if it is not being used. This \"packing\" should only be done by nurses experienced with caring for this type of catheter. You may shower in 24 hours, but you need to cover the catheter with plastic wrap and tape to keep it dry while you are showering. Keep the site clean, dry, and protected. Do not immerse yourself in water like in the case of tub baths or swimming as long as you have the catheter. Call If:   You should call your Physician and/or the Radiology Nurse if you have any signs of infection, shortness of breath, or if the dressing should come off or become moist.  You will be instructed on where to go for a new dressing. You should not have to change the dressings yourself, as that will be done by the nurses who access the catheter. Follow-Up Instructions:  Please see your ordering doctor as he/she has requested. To Reach Us: If you have any questions about your procedure, please call the Interventional Radiology department at [phone]. After business hours (5pm) and weekends, call the answering service at [phone] and ask for the Radiologist on call to be paged. Si tiene Preguntas acerca del procedimiento, por favor llame al departamento de Radiología Intervencional al [phone]. Después de horas de oficina (5 pm) y los fines de [place], llamar al [place] al [phone] y pregunte por el Radiologo de [facility]. Interventional Radiology General Nurse Discharge    After general anesthesia or intravenous sedation, for 24 hours or while taking prescription Narcotics:  · Limit your activities  · Do not drive and operate hazardous machinery  · Do not make important personal or business decisions  · Do  not drink alcoholic beverages  · If you have not urinated within 8 hours after discharge, please contact your surgeon on call. * Please give a list of your current medications to your Primary Care Provider. * Please update this list whenever your medications are discontinued, doses are     changed, or new medications (including over-the-counter products) are added. * Please carry medication information at all times in case of emergency situations.     These are general instructions for a healthy lifestyle:    No smoking/ No tobacco products/ Avoid exposure to second hand smoke  Surgeon General's Warning:  Quitting smoking now greatly reduces serious risk to your health. Obesity, smoking, and sedentary lifestyle greatly increases your risk for illness  A healthy diet, regular physical exercise & weight monitoring are important for maintaining a healthy lifestyle    You may be retaining fluid if you have a history of heart failure or if you experience any of the following symptoms:  Weight gain of 3 pounds or more overnight or 5 pounds in a week, increased swelling in our hands or feet or shortness of breath while lying flat in bed. Please call your doctor as soon as you notice any of these symptoms; do not wait until your next office visit. Recognize signs and symptoms of STROKE:  F-face looks uneven    A-arms unable to move or move unevenly    S-speech slurred or non-existent    T-time-call 911 as soon as signs and symptoms begin-DO NOT go       Back to bed or wait to see if you get better-TIME IS BRAIN.     Patient Signature:  Date: 3/29/2021  Discharging Nurse: Bryan Arriola RN

## 2021-03-29 NOTE — PROGRESS NOTES
Mobilization Day #4. Labs needed at next visit see orders. Next Appointment scheduled possible at 1800 today. WBC/ANC= 64.5/52.3  Apheresis catheter placement scheduled for today. New orders received None. Issues - None  Arrived via wheelchair. Labs drawn & reviewed. Zarxio given. Patient tolerated well. Patient to have apheresis catheter placed today. Will call patient regarding result of CD 34 today and inform patient about 1800 appointment. Discharged via wheelchair.

## 2021-03-30 ENCOUNTER — ANESTHESIA EVENT (OUTPATIENT)
Dept: SURGERY | Age: 56
End: 2021-03-30
Payer: MEDICARE

## 2021-03-30 ENCOUNTER — HOSPITAL ENCOUNTER (OUTPATIENT)
Dept: INFUSION THERAPY | Age: 56
Discharge: HOME | End: 2021-03-30
Payer: MEDICARE

## 2021-03-30 ENCOUNTER — PATIENT OUTREACH (OUTPATIENT)
Dept: CASE MANAGEMENT | Age: 56
End: 2021-03-30

## 2021-03-30 VITALS
OXYGEN SATURATION: 97 % | TEMPERATURE: 98.4 F | DIASTOLIC BLOOD PRESSURE: 100 MMHG | BODY MASS INDEX: 25.61 KG/M2 | RESPIRATION RATE: 18 BRPM | WEIGHT: 173.4 LBS | HEART RATE: 110 BPM | SYSTOLIC BLOOD PRESSURE: 118 MMHG

## 2021-03-30 DIAGNOSIS — C90.00 MULTIPLE MYELOMA NOT HAVING ACHIEVED REMISSION (HCC): Primary | ICD-10-CM

## 2021-03-30 LAB
ABO + RH BLD: NORMAL
ANION GAP SERPL CALC-SCNC: 6 MMOL/L (ref 7–16)
BASOPHILS # BLD: 0.5 K/UL (ref 0–0.2)
BASOPHILS # BLD: 0.6 K/UL (ref 0–0.2)
BASOPHILS NFR BLD: 1 % (ref 0–2)
BASOPHILS NFR BLD: 1 % (ref 0–2)
BUN SERPL-MCNC: 7 MG/DL (ref 6–23)
CALCIUM SERPL-MCNC: 8.9 MG/DL (ref 8.3–10.4)
CD34,XCD34T: NORMAL
CHLORIDE SERPL-SCNC: 106 MMOL/L (ref 98–107)
CO2 SERPL-SCNC: 28 MMOL/L (ref 21–32)
CREAT SERPL-MCNC: 0.8 MG/DL (ref 0.8–1.5)
DIFFERENTIAL METHOD BLD: ABNORMAL
DIFFERENTIAL METHOD BLD: ABNORMAL
EOSINOPHIL # BLD: 0.5 K/UL (ref 0–0.8)
EOSINOPHIL # BLD: 0.6 K/UL (ref 0–0.8)
EOSINOPHIL NFR BLD: 1 % (ref 0.5–7.8)
EOSINOPHIL NFR BLD: 1 % (ref 0.5–7.8)
ERYTHROCYTE [DISTWIDTH] IN BLOOD BY AUTOMATED COUNT: 19.1 % (ref 11.9–14.6)
ERYTHROCYTE [DISTWIDTH] IN BLOOD BY AUTOMATED COUNT: 19.3 % (ref 11.9–14.6)
GLUCOSE SERPL-MCNC: 101 MG/DL (ref 65–100)
HCT VFR BLD AUTO: 35.6 %
HCT VFR BLD AUTO: 35.9 %
HGB BLD-MCNC: 10.5 G/DL (ref 13.6–17.2)
HGB BLD-MCNC: 10.7 G/DL (ref 13.6–17.2)
IMM GRANULOCYTES # BLD AUTO: 2.3 K/UL (ref 0–0.5)
IMM GRANULOCYTES # BLD AUTO: 3.3 K/UL (ref 0–0.5)
IMM GRANULOCYTES NFR BLD AUTO: 5 % (ref 0–5)
IMM GRANULOCYTES NFR BLD AUTO: 6 % (ref 0–5)
LYMPHOCYTES # BLD: 2.3 K/UL (ref 0.5–4.6)
LYMPHOCYTES # BLD: 3.3 K/UL (ref 0.5–4.6)
LYMPHOCYTES NFR BLD: 5 % (ref 13–44)
LYMPHOCYTES NFR BLD: 6 % (ref 13–44)
MAGNESIUM SERPL-MCNC: 1.9 MG/DL (ref 1.8–2.4)
MCH RBC QN AUTO: 24.9 PG (ref 26.1–32.9)
MCH RBC QN AUTO: 25.1 PG (ref 26.1–32.9)
MCHC RBC AUTO-ENTMCNC: 29.5 G/DL (ref 31.4–35)
MCHC RBC AUTO-ENTMCNC: 29.8 G/DL (ref 31.4–35)
MCV RBC AUTO: 84.3 FL (ref 79.6–97.8)
MCV RBC AUTO: 84.4 FL (ref 79.6–97.8)
MONOCYTES # BLD: 2.8 K/UL (ref 0.1–1.3)
MONOCYTES # BLD: 5.6 K/UL (ref 0.1–1.3)
MONOCYTES NFR BLD: 10 % (ref 4–12)
MONOCYTES NFR BLD: 6 % (ref 4–12)
NEUTS SEG # BLD: 38.3 K/UL (ref 1.7–8.2)
NEUTS SEG # BLD: 42.3 K/UL (ref 1.7–8.2)
NEUTS SEG NFR BLD: 76 % (ref 43–78)
NEUTS SEG NFR BLD: 82 % (ref 43–78)
NRBC # BLD: 0.1 K/UL (ref 0–0.2)
NRBC # BLD: 0.34 K/UL (ref 0–0.2)
PLATELET # BLD AUTO: 204 K/UL (ref 150–450)
PLATELET # BLD AUTO: 363 K/UL (ref 150–450)
PLATELET COMMENTS,PCOM: ADEQUATE
PLATELET COMMENTS,PCOM: ADEQUATE
PMV BLD AUTO: 8.9 FL (ref 9.4–12.3)
PMV BLD AUTO: 9.8 FL (ref 9.4–12.3)
POTASSIUM SERPL-SCNC: 3.2 MMOL/L (ref 3.5–5.1)
RBC # BLD AUTO: 4.22 M/UL (ref 4.23–5.6)
RBC # BLD AUTO: 4.26 M/UL (ref 4.23–5.6)
RBC MORPH BLD: ABNORMAL
SODIUM SERPL-SCNC: 140 MMOL/L (ref 136–145)
WBC # BLD AUTO: 46.7 K/UL (ref 4.3–11.1)
WBC # BLD AUTO: 55.7 K/UL (ref 4.3–11.1)
WBC MORPH BLD: ABNORMAL
WBC MORPH BLD: ABNORMAL

## 2021-03-30 PROCEDURE — 86367 STEM CELLS TOTAL COUNT: CPT

## 2021-03-30 PROCEDURE — 96366 THER/PROPH/DIAG IV INF ADDON: CPT

## 2021-03-30 PROCEDURE — 38206 HARVEST AUTO STEM CELLS: CPT

## 2021-03-30 PROCEDURE — 38205 HARVEST ALLOGENEIC STEM CELL: CPT

## 2021-03-30 PROCEDURE — 86901 BLOOD TYPING SEROLOGIC RH(D): CPT

## 2021-03-30 PROCEDURE — 74011250637 HC RX REV CODE- 250/637: Performed by: INTERNAL MEDICINE

## 2021-03-30 PROCEDURE — 36511 APHERESIS WBC: CPT

## 2021-03-30 PROCEDURE — 74011250636 HC RX REV CODE- 250/636: Performed by: INTERNAL MEDICINE

## 2021-03-30 PROCEDURE — 74011250636 HC RX REV CODE- 250/636: Performed by: NURSE PRACTITIONER

## 2021-03-30 PROCEDURE — 83735 ASSAY OF MAGNESIUM: CPT

## 2021-03-30 PROCEDURE — 96365 THER/PROPH/DIAG IV INF INIT: CPT

## 2021-03-30 PROCEDURE — 99214 OFFICE O/P EST MOD 30 MIN: CPT | Performed by: NURSE PRACTITIONER

## 2021-03-30 PROCEDURE — 80048 BASIC METABOLIC PNL TOTAL CA: CPT

## 2021-03-30 PROCEDURE — 85025 COMPLETE CBC W/AUTO DIFF WBC: CPT

## 2021-03-30 RX ORDER — OXYCODONE HYDROCHLORIDE 5 MG/1
5 TABLET ORAL
Status: CANCELLED | OUTPATIENT
Start: 2021-03-30 | End: 2021-03-31

## 2021-03-30 RX ORDER — GUAIFENESIN 100 MG/5ML
81 LIQUID (ML) ORAL
Status: DISPENSED | OUTPATIENT
Start: 2021-03-30 | End: 2021-03-30

## 2021-03-30 RX ORDER — SODIUM CHLORIDE, SODIUM LACTATE, POTASSIUM CHLORIDE, CALCIUM CHLORIDE 600; 310; 30; 20 MG/100ML; MG/100ML; MG/100ML; MG/100ML
75 INJECTION, SOLUTION INTRAVENOUS CONTINUOUS
Status: CANCELLED | OUTPATIENT
Start: 2021-03-30 | End: 2021-03-31

## 2021-03-30 RX ORDER — HYDROCODONE BITARTRATE AND ACETAMINOPHEN 5; 325 MG/1; MG/1
2 TABLET ORAL AS NEEDED
Status: CANCELLED | OUTPATIENT
Start: 2021-03-30

## 2021-03-30 RX ORDER — SODIUM CHLORIDE 9 MG/ML
1000 INJECTION, SOLUTION INTRAVENOUS CONTINUOUS
Status: ACTIVE | OUTPATIENT
Start: 2021-03-30 | End: 2021-03-30

## 2021-03-30 RX ORDER — POTASSIUM CHLORIDE 14.9 MG/ML
20 INJECTION INTRAVENOUS ONCE
Status: COMPLETED | OUTPATIENT
Start: 2021-03-30 | End: 2021-03-30

## 2021-03-30 RX ORDER — FENTANYL CITRATE 50 UG/ML
100 INJECTION, SOLUTION INTRAMUSCULAR; INTRAVENOUS ONCE
Status: CANCELLED | OUTPATIENT
Start: 2021-03-30 | End: 2021-03-30

## 2021-03-30 RX ORDER — MIDAZOLAM HYDROCHLORIDE 1 MG/ML
2 INJECTION, SOLUTION INTRAMUSCULAR; INTRAVENOUS
Status: CANCELLED | OUTPATIENT
Start: 2021-03-30 | End: 2021-03-31

## 2021-03-30 RX ORDER — HYDROMORPHONE HYDROCHLORIDE 1 MG/ML
0.5 INJECTION, SOLUTION INTRAMUSCULAR; INTRAVENOUS; SUBCUTANEOUS
Status: CANCELLED | OUTPATIENT
Start: 2021-03-30

## 2021-03-30 RX ORDER — LIDOCAINE HYDROCHLORIDE 10 MG/ML
0.1 INJECTION INFILTRATION; PERINEURAL AS NEEDED
Status: CANCELLED | OUTPATIENT
Start: 2021-03-30

## 2021-03-30 RX ORDER — SODIUM CHLORIDE, SODIUM LACTATE, POTASSIUM CHLORIDE, CALCIUM CHLORIDE 600; 310; 30; 20 MG/100ML; MG/100ML; MG/100ML; MG/100ML
75 INJECTION, SOLUTION INTRAVENOUS CONTINUOUS
Status: CANCELLED | OUTPATIENT
Start: 2021-03-30

## 2021-03-30 RX ADMIN — FILGRASTIM-SNDZ 780 MCG: 480 INJECTION, SOLUTION INTRAVENOUS; SUBCUTANEOUS at 08:50

## 2021-03-30 RX ADMIN — SODIUM CHLORIDE 1000 ML: 900 INJECTION, SOLUTION INTRAVENOUS at 10:35

## 2021-03-30 RX ADMIN — ASPIRIN 81 MG: 81 TABLET, CHEWABLE ORAL at 11:41

## 2021-03-30 RX ADMIN — POTASSIUM CHLORIDE 20 MEQ: 14.9 INJECTION, SOLUTION INTRAVENOUS at 11:02

## 2021-03-30 RX ADMIN — CALCIUM GLUCONATE 6 G: 98 INJECTION, SOLUTION INTRAVENOUS at 10:25

## 2021-03-30 NOTE — PROGRESS NOTES
Apheresis Day: 1  Dx: Multiple Myeloma  Pre-collection/mobilization form was reviewed prior to initiating collection. Granix was given per policy. Mozobil used: no (yes/no)  Type of catheter (temp or tunneled): peripheral  Calcium 6 g used during procedure. CD34: 51  Predictive for 20 liters: 3.8 x 106/kg    30 liters: 5.7 x 106/kg  Pre WBC 55.7  (critical value does not fall outside of expected limits as documented in the medical protocol)  Pre-hgb: 10.7  /  Plt: 363  Post WBC 46.7  Post-hgb: 10.5   /  Plt: 204  Blood/electrolyte replacements: Potassium Chloride 20 meq  Whole blood processed: 64067  Collection results received at 1810. Patient collected 3.63 x 106/kg for cumulative 3.63 x 106/kg. Collection goal: 3x 106/kg   Hematocrit in collection bag 2.5  Pt seen by: Lorna Alberto NP NP/MD  Issues: multiple pressure alarms noted during procedure. Clumping noted in collect line, asa 81 mg given.         Target CD34+/Kg 3           Kg recipient 78.7   CD34+ pre-count 51   WB liters to process 13.7

## 2021-03-30 NOTE — PROGRESS NOTES
3/30 Seen pt today in infusion. Infusion was able to get 1 IV placed so far for collection today. He is complaining of lower back pain. States he has been taking claritin to help with the bone pain. Pt is very upset with his visit to IR 3/29. Stated that the \"lady there\" told him to not eat or drink anything before he comes, so pt was under the impression that he was being put to sleep for his procedure. But when he got there that he wasn't put to sleep and could feel them \"cutting on\" him. He is very upset regarding this.   Otherwise we will proceed with collection today and tomorrow pt will go to IR for SEDATED tunneled cath placement.

## 2021-03-30 NOTE — PROGRESS NOTES
[address], [address] [place] [address]  Phone: [phone]           3/30/2021  Vibha Smith  1965  [id number]         Sulaiman Pruitt a 48year old -American man who has returned to my clinic for a follow-up visit; he was initially referred to me by Dr. Vazquez Jimenez. In 9/2020 he was diagnosed with Multiple Myeloma, Kappa light chain, del.13q, t( 11;14 ), ISS Stage 3; at diagnosis his Kappa level was 5824, he received 4 cycles of RVd and achieved a VGPR, his Kappa level decreased to 213.    3/30/2021:  Pt went to have pheresis catheter placed and was unaware that they would be using local anesthesia vs. General.  He was able to feel her \"cutting on him\" and ultimately procedure was aborted. He denies any other significant complaints. He has mild nausea, controlled with zofran. He has had about one day of constipation, but has medications to take for this at home. His appetite has been pretty good, drinking and eating well. He has some pain in his lower back r/t Granix, taking Claritin for this. Discussed with Dr. Remy Chacon by hem navigators and okay to proceed with collection today if 2 large bore IVs are placed.          ALLERGIES:    No known drug allergies.        FAMILY HISTORY:    No hematologic disorders.        SOCIAL HISTORY:    He is  and lives with his wife. He used to work in a [address] [place] [address]. He stopped smoking cigarettes in 6/2019.        PAST MEDICAL HISTORY:     Osteoarthritis, renal insufficiency, Interstitial Lung Disease, Vitamin D deficiency and Multiple Myeloma.        ROS:  The patient complained of fatigue and parasthesia; all other systems negative.        PHYSICAL EXAM:   The patient was alert, awake and oriented, no acute distress was noted. Oral examination did not reveal any mucosal lesions. Lymph node examination did not reveal any adenopathy. Neck examination revealed a supple neck, no thyromegaly or masses were noted.  Chest examination revealed normal vesicular breath sounds. Heart examination revealed S-1 and S-2 without any murmurs. Abdominal examination revealed a non-tender abdomen, bowel sounds were positive, no organomegaly could be appreciated. Examination of the extremities did not reveal any tenderness or erythema. Examination of the skin did not reveal any lesions. Medical problems and test results were reviewed with the patient today.        KPS:    70.        LABORATORY INVESTIGATIONS:  CBC showed a WBC count of 55.7, ANC was 42.3, Hemoglobin was 10.7 and Platelets YABH 168.        ASSESSMENT:    Multiple Myeloma, Kappa light chain; Vitamin D deficiency. I spent a total of 30 minutes on the day of the visit, managing the care of this patient.        PLAN:  He should continue taking supplemental Vitamin D. I believe he will benefit from an Autologous PBHCT; he meets expanded suitability criteria due to a low DLCO of 31% and has been cleared by Pulmonology. Proceed with D+1 of collection of two large bore peripheral IVs can be obtained (okay per Dr. Ivone Morales).   He will have pheresis catheter placed under sedation tomorrow.                    Latonia Gallegos NP  [facility]  [address] [place] [address]  Office : [phone]  Fax : [phone]

## 2021-03-31 ENCOUNTER — ANESTHESIA (OUTPATIENT)
Dept: SURGERY | Age: 56
End: 2021-03-31
Payer: MEDICARE

## 2021-03-31 ENCOUNTER — HOSPITAL ENCOUNTER (OUTPATIENT)
Dept: INFUSION THERAPY | Age: 56
End: 2021-03-31

## 2021-03-31 ENCOUNTER — HOSPITAL ENCOUNTER (OUTPATIENT)
Dept: INTERVENTIONAL RADIOLOGY/VASCULAR | Age: 56
Discharge: HOME | End: 2021-03-31
Attending: INTERNAL MEDICINE
Payer: MEDICARE

## 2021-03-31 ENCOUNTER — HOSPITAL ENCOUNTER (OUTPATIENT)
Age: 56
Setting detail: OUTPATIENT SURGERY
Discharge: HOME | End: 2021-03-31
Attending: RADIOLOGY | Admitting: RADIOLOGY
Payer: MEDICARE

## 2021-03-31 ENCOUNTER — APPOINTMENT (OUTPATIENT)
Dept: INFUSION THERAPY | Age: 56
End: 2021-03-31

## 2021-03-31 VITALS
WEIGHT: 173.4 LBS | DIASTOLIC BLOOD PRESSURE: 82 MMHG | BODY MASS INDEX: 24.82 KG/M2 | HEIGHT: 70 IN | RESPIRATION RATE: 14 BRPM | TEMPERATURE: 99.3 F | SYSTOLIC BLOOD PRESSURE: 121 MMHG | OXYGEN SATURATION: 96 % | HEART RATE: 101 BPM

## 2021-03-31 VITALS
SYSTOLIC BLOOD PRESSURE: 115 MMHG | TEMPERATURE: 97.8 F | RESPIRATION RATE: 11 BRPM | OXYGEN SATURATION: 99 % | DIASTOLIC BLOOD PRESSURE: 81 MMHG | HEART RATE: 107 BPM

## 2021-03-31 DIAGNOSIS — C90.00 MULTIPLE MYELOMA NOT HAVING ACHIEVED REMISSION (HCC): ICD-10-CM

## 2021-03-31 LAB
HBV SURFACE AG SER QL: NONREACTIVE
HCV AB SER QL: NONREACTIVE
HIV 1+2 AB+HIV1 P24 AG SERPL QL IA: NONREACTIVE
HIV12 RESULT COMMENT, HHIVC: ABNORMAL

## 2021-03-31 PROCEDURE — C1750 CATH, HEMODIALYSIS,LONG-TERM: HCPCS

## 2021-03-31 PROCEDURE — 74011250636 HC RX REV CODE- 250/636: Performed by: STUDENT IN AN ORGANIZED HEALTH CARE EDUCATION/TRAINING PROGRAM

## 2021-03-31 PROCEDURE — 74011250636 HC RX REV CODE- 250/636: Performed by: PHYSICIAN ASSISTANT

## 2021-03-31 PROCEDURE — 77001 FLUOROGUIDE FOR VEIN DEVICE: CPT

## 2021-03-31 PROCEDURE — 74011000250 HC RX REV CODE- 250: Performed by: STUDENT IN AN ORGANIZED HEALTH CARE EDUCATION/TRAINING PROGRAM

## 2021-03-31 PROCEDURE — 77030018719 HC DRSG PTCH ANTIMIC J&J -A

## 2021-03-31 PROCEDURE — 77030031131 HC SUT MXN P COVD -B

## 2021-03-31 PROCEDURE — 76060000032 HC ANESTHESIA 0.5 TO 1 HR: Performed by: RADIOLOGY

## 2021-03-31 PROCEDURE — C1894 INTRO/SHEATH, NON-LASER: HCPCS

## 2021-03-31 PROCEDURE — 77030010507 HC ADH SKN DERMBND J&J -B

## 2021-03-31 PROCEDURE — C1769 GUIDE WIRE: HCPCS

## 2021-03-31 PROCEDURE — 77030002916 HC SUT ETHLN J&J -A

## 2021-03-31 PROCEDURE — 74011000250 HC RX REV CODE- 250: Performed by: PHYSICIAN ASSISTANT

## 2021-03-31 RX ORDER — SODIUM CHLORIDE, SODIUM LACTATE, POTASSIUM CHLORIDE, CALCIUM CHLORIDE 600; 310; 30; 20 MG/100ML; MG/100ML; MG/100ML; MG/100ML
75 INJECTION, SOLUTION INTRAVENOUS CONTINUOUS
Status: DISCONTINUED | OUTPATIENT
Start: 2021-03-31 | End: 2021-04-01 | Stop reason: HOSPADM

## 2021-03-31 RX ORDER — PROPOFOL 10 MG/ML
INJECTION, EMULSION INTRAVENOUS AS NEEDED
Status: DISCONTINUED | OUTPATIENT
Start: 2021-03-31 | End: 2021-03-31 | Stop reason: HOSPADM

## 2021-03-31 RX ORDER — HEPARIN SODIUM 1000 [USP'U]/ML
1-10000 INJECTION, SOLUTION INTRAVENOUS; SUBCUTANEOUS
Status: DISCONTINUED | OUTPATIENT
Start: 2021-03-31 | End: 2021-04-04 | Stop reason: HOSPADM

## 2021-03-31 RX ORDER — MIDAZOLAM HYDROCHLORIDE 1 MG/ML
2 INJECTION, SOLUTION INTRAMUSCULAR; INTRAVENOUS
Status: DISCONTINUED | OUTPATIENT
Start: 2021-03-31 | End: 2021-04-01 | Stop reason: HOSPADM

## 2021-03-31 RX ORDER — SODIUM CHLORIDE, SODIUM LACTATE, POTASSIUM CHLORIDE, CALCIUM CHLORIDE 600; 310; 30; 20 MG/100ML; MG/100ML; MG/100ML; MG/100ML
INJECTION, SOLUTION INTRAVENOUS
Status: DISCONTINUED | OUTPATIENT
Start: 2021-03-31 | End: 2021-03-31 | Stop reason: HOSPADM

## 2021-03-31 RX ORDER — LIDOCAINE HYDROCHLORIDE 20 MG/ML
INJECTION, SOLUTION EPIDURAL; INFILTRATION; INTRACAUDAL; PERINEURAL AS NEEDED
Status: DISCONTINUED | OUTPATIENT
Start: 2021-03-31 | End: 2021-03-31 | Stop reason: HOSPADM

## 2021-03-31 RX ORDER — FENTANYL CITRATE 50 UG/ML
100 INJECTION, SOLUTION INTRAMUSCULAR; INTRAVENOUS ONCE
Status: ACTIVE | OUTPATIENT
Start: 2021-03-31 | End: 2021-03-31

## 2021-03-31 RX ORDER — PROPOFOL 10 MG/ML
INJECTION, EMULSION INTRAVENOUS
Status: DISCONTINUED | OUTPATIENT
Start: 2021-03-31 | End: 2021-03-31 | Stop reason: HOSPADM

## 2021-03-31 RX ORDER — LIDOCAINE HYDROCHLORIDE 10 MG/ML
0.1 INJECTION INFILTRATION; PERINEURAL AS NEEDED
Status: DISCONTINUED | OUTPATIENT
Start: 2021-03-31 | End: 2021-04-04 | Stop reason: HOSPADM

## 2021-03-31 RX ORDER — ESMOLOL HYDROCHLORIDE 10 MG/ML
INJECTION INTRAVENOUS AS NEEDED
Status: DISCONTINUED | OUTPATIENT
Start: 2021-03-31 | End: 2021-03-31 | Stop reason: HOSPADM

## 2021-03-31 RX ADMIN — PROPOFOL 75 MCG/KG/MIN: 10 INJECTION, EMULSION INTRAVENOUS at 10:02

## 2021-03-31 RX ADMIN — LIDOCAINE HYDROCHLORIDE 180 MG: 10; .005 INJECTION, SOLUTION EPIDURAL; INFILTRATION; INTRACAUDAL; PERINEURAL at 10:15

## 2021-03-31 RX ADMIN — PROPOFOL 40 MG: 10 INJECTION, EMULSION INTRAVENOUS at 10:02

## 2021-03-31 RX ADMIN — HEPARIN SODIUM 1600 UNITS: 1000 INJECTION INTRAVENOUS; SUBCUTANEOUS at 10:27

## 2021-03-31 RX ADMIN — ESMOLOL HYDROCHLORIDE 20 MG: 10 INJECTION, SOLUTION INTRAVENOUS at 10:24

## 2021-03-31 RX ADMIN — SODIUM CHLORIDE, SODIUM LACTATE, POTASSIUM CHLORIDE, AND CALCIUM CHLORIDE: 600; 310; 30; 20 INJECTION, SOLUTION INTRAVENOUS at 09:55

## 2021-03-31 RX ADMIN — LIDOCAINE HYDROCHLORIDE 50 MG: 20 INJECTION, SOLUTION EPIDURAL; INFILTRATION; INTRACAUDAL; PERINEURAL at 10:01

## 2021-03-31 RX ADMIN — HEPARIN SODIUM 1600 UNITS: 1000 INJECTION INTRAVENOUS; SUBCUTANEOUS at 10:28

## 2021-03-31 RX ADMIN — PROPOFOL 20 MG: 10 INJECTION, EMULSION INTRAVENOUS at 10:15

## 2021-03-31 NOTE — DISCHARGE INSTRUCTIONS
[address]  Department of Interventional Radiology  [facility]  [phone] Office  [phone] Fax    Tunneled or Non Tunneled Catheter Discharge Instructions    General Information:   A catheter, either tunneled (permanent) or non-tunneled (temporary) catheter was inserted into your neck today for the purpose of Cancer treatment (apheresis) or dialysis. Your catheter will be used for the length of your apheresis, or until you get a fistula placed in surgery for dialysis. After this time, your catheter may be removed. You may return to our department for the catheter removal.  A non-tunneled catheter will exit at the neck. There will be three ports, two of which will be used for dialysis or apheresis. The one smaller port in the middle can be used like a regular IV. It ideally is used only for about two weeks. A tunneled catheter will exit lower down on the chest wall, and can be used for a longer period of time. There is no IV port on these. The tunneled catheter is used only for dialysis. In case of emergencies only can drugs be given through these catheters and only with written permission from your doctor. Home Care Instructions: You can resume your regular diet. Do not drink alcohol, drive, or make any important legal decisions in the next 24 hours. Watch the site carefully for signs of infection, like fever, drainage, redness, and/or swelling. Your catheter should be \"packed\" with heparin after each use or at least once a week if it is not being used. This \"packing\" should only be done by nurses experienced with caring for this type of catheter. You may shower in 24 hours, but you need to cover the catheter with plastic wrap and tape to keep it dry while you are showering. Keep the site clean, dry, and protected. Do not immerse yourself in water like in the case of tub baths or swimming as long as you have the catheter. Call If:   You should call your Physician and/or the Radiology Nurse if you have any signs of infection, shortness of breath, or if the dressing should come off or become moist.  You will be instructed on where to go for a new dressing. You should not have to change the dressings yourself, as that will be done by the nurses who access the catheter. Follow-Up Instructions:  Please see your ordering doctor as he/she has requested. To Reach Us: If you have any questions about your procedure, please call the Interventional Radiology department at [phone]. After business hours (5pm) and weekends, call the answering service at [phone] and ask for the Radiologist on call to be paged. Si tiene Preguntas acerca del procedimiento, por favor llame al departamento de Radiología Intervencional al [phone]. Después de horas de oficina (5 pm) y los fines de [place], llamar al Franci Dunn al [phone] y pregunte por el Radiologo de [facility]. Interventional Radiology General Nurse Discharge    After general anesthesia or intravenous sedation, for 24 hours or while taking prescription Narcotics:  · Limit your activities  · Do not drive and operate hazardous machinery  · Do not make important personal or business decisions  · Do  not drink alcoholic beverages  · If you have not urinated within 8 hours after discharge, please contact your surgeon on call. * Please give a list of your current medications to your Primary Care Provider. * Please update this list whenever your medications are discontinued, doses are     changed, or new medications (including over-the-counter products) are added. * Please carry medication information at all times in case of emergency situations.     These are general instructions for a healthy lifestyle:    No smoking/ No tobacco products/ Avoid exposure to second hand smoke  Surgeon General's Warning:  Quitting smoking now greatly reduces serious risk to your health. Obesity, smoking, and sedentary lifestyle greatly increases your risk for illness  A healthy diet, regular physical exercise & weight monitoring are important for maintaining a healthy lifestyle    You may be retaining fluid if you have a history of heart failure or if you experience any of the following symptoms:  Weight gain of 3 pounds or more overnight or 5 pounds in a week, increased swelling in our hands or feet or shortness of breath while lying flat in bed. Please call your doctor as soon as you notice any of these symptoms; do not wait until your next office visit. Recognize signs and symptoms of STROKE:  F-face looks uneven    A-arms unable to move or move unevenly    S-speech slurred or non-existent    T-time-call 911 as soon as signs and symptoms begin-DO NOT go       Back to bed or wait to see if you get better-TIME IS BRAIN.       Patient Signature:  Date: 3/31/2021  Discharging Nurse: Marivel Hardy RN

## 2021-03-31 NOTE — ANESTHESIA POSTPROCEDURE EVALUATION
Procedure(s):  IR ANESTHESIA- TUNNEL CVC WITHOUT PORT.    total IV anesthesia    Anesthesia Post Evaluation      Multimodal analgesia: multimodal analgesia not used between 6 hours prior to anesthesia start to PACU discharge  Patient location during evaluation: PACU  Patient participation: complete - patient participated  Level of consciousness: awake and alert  Pain score: 0  Pain management: adequate  Airway patency: patent  Anesthetic complications: no  Cardiovascular status: acceptable and hemodynamically stable  Respiratory status: acceptable and spontaneous ventilation  Hydration status: acceptable  Post anesthesia nausea and vomiting:  none  Final Post Anesthesia Temperature Assessment:  Normothermia (36.0-37.5 degrees C)      INITIAL Post-op Vital signs:   Vitals Value Taken Time   /82 03/31/21 1115   Temp 36.6 °C (97.8 °F) 03/31/21 1035   Pulse 101 03/31/21 1115   Resp 14 03/31/21 1115   SpO2 96 % 03/31/21 1120

## 2021-03-31 NOTE — PROCEDURES
Department of Interventional Radiology  [phone]        Interventional Radiology Brief Procedure Note    Patient: Meagan Rangel MRN: [id number]  SSN: [id number]    YOB: 1965  Age: 48 y.o.   Sex: male      Date of Procedure: 3/31/2021    Pre-Procedure Diagnosis: multiple myeloma    Post-Procedure Diagnosis: SAME    Procedure(s): Tunneled Central Venous Catheter    Brief Description of Procedure: US, fluoro guided right IJ tunneled pheresis catheter placed    Performed By: Santos Rice PA-C     Assistants: None    Anesthesia:TIVS/MAC    Estimated Blood Loss: Less than 10ml    Specimens:  None    Implants:  Tunnelled Apheresis Catheter    Findings: catheter tip in right atrium     Complications: None    Recommendations: ok to use catheter     Follow Up: prn    Signed By: Santos Rice PA-C     March 31, 2021

## 2021-03-31 NOTE — PROGRESS NOTES
Discharge instructions gone over with pt and wife at bedside. All questions answered, no concerns voiced. Pt denies pain or sob. Transportation arranged by wife.

## 2021-03-31 NOTE — H&P
Department of Interventional Radiology  [phone]    History and Physical    Patient:  Rigoberto Diaz MRN:  [id number]  SSN:  [id number]    YOB: 1965  Age:  48 y.o. Sex:  male      Primary Care Provider:  Izaiah Olivo NP  Referring Physician:  Solomon Toribio MD    Date of Surgery Update:    History and physical has been reviewed. The patient has been examined.  There have been no significant clinical changes since the completion of the originally dated History and Physical.    Signed By: Cee Rebollar PA-C     March 31, 2021 9:40 AM

## 2021-03-31 NOTE — ANESTHESIA PREPROCEDURE EVALUATION
Relevant Problems   PERSONAL HX & FAMILY HX OF CANCER   (+) Multiple myeloma not having achieved remission (HCC)       Anesthetic History   No history of anesthetic complications            Review of Systems / Medical History  Patient summary reviewed and pertinent labs reviewed    Pulmonary                Comments: Interstitial lung disease   Neuro/Psych   Within defined limits           Cardiovascular  Within defined limits                Exercise tolerance: >4 METS  Comments: Echo 2/2021 - normal   GI/Hepatic/Renal  Within defined limits              Endo/Other        Cancer (multiple myeloma) and anemia (Hb 10.5)     Other Findings              Physical Exam    Airway  Mallampati: II  TM Distance: 4 - 6 cm  Neck ROM: normal range of motion   Mouth opening: Normal     Cardiovascular  Regular rate and rhythm,  S1 and S2 normal,  no murmur, click, rub, or gallop             Dental         Pulmonary  Breath sounds clear to auscultation      :bilateral         Abdominal         Other Findings            Anesthetic Plan    ASA: 2  Anesthesia type: total IV anesthesia          Induction: Intravenous  Anesthetic plan and risks discussed with: Patient and Spouse

## 2021-04-01 ENCOUNTER — APPOINTMENT (OUTPATIENT)
Dept: INFUSION THERAPY | Age: 56
End: 2021-04-01

## 2021-04-01 LAB
FUNGUS CULTURE, RFCO2T: NORMAL
FUNGUS SMEAR, RFCO1T: NORMAL
FUNGUS SPEC CULT: NORMAL
FUNGUS STAIN, 188244: NORMAL
REFLEX TO ID, RFCO3T: NORMAL
SPECIMEN SOURCE: NORMAL
SPECIMEN SOURCE: NORMAL

## 2021-04-17 LAB
ACID FAST STN SPEC: NEGATIVE
MYCOBACTERIUM SPEC QL CULT: NEGATIVE
SPECIMEN PREPARATION: NORMAL
SPECIMEN SOURCE: NORMAL

## 2021-04-21 ENCOUNTER — HOSPITAL ENCOUNTER (OUTPATIENT)
Dept: LAB | Age: 56
Discharge: HOME | End: 2021-04-21
Payer: MEDICARE

## 2021-04-21 ENCOUNTER — PATIENT OUTREACH (OUTPATIENT)
Dept: CASE MANAGEMENT | Age: 56
End: 2021-04-21

## 2021-04-21 DIAGNOSIS — C90.00 MULTIPLE MYELOMA, REMISSION STATUS UNSPECIFIED (HCC): ICD-10-CM

## 2021-04-21 LAB
ALBUMIN SERPL-MCNC: 3.5 G/DL (ref 3.5–5)
ALBUMIN/GLOB SERPL: 1.1 {RATIO} (ref 1.2–3.5)
ALP SERPL-CCNC: 94 U/L (ref 50–136)
ALT SERPL-CCNC: 30 U/L (ref 12–65)
ANION GAP SERPL CALC-SCNC: 5 MMOL/L (ref 7–16)
APPEARANCE UR: CLEAR
AST SERPL-CCNC: 21 U/L (ref 15–37)
BACTERIA URNS QL MICRO: ABNORMAL /HPF
BASOPHILS # BLD: 0 K/UL (ref 0–0.2)
BASOPHILS NFR BLD: 0 % (ref 0–2)
BILIRUB SERPL-MCNC: 0.2 MG/DL (ref 0.2–1.1)
BILIRUB UR QL: NEGATIVE
BUN SERPL-MCNC: 11 MG/DL (ref 6–23)
CALCIUM SERPL-MCNC: 9.4 MG/DL (ref 8.3–10.4)
CASTS URNS QL MICRO: 0 /LPF
CHLORIDE SERPL-SCNC: 100 MMOL/L (ref 98–107)
CO2 SERPL-SCNC: 33 MMOL/L (ref 21–32)
COLOR UR: YELLOW
COVID-19 RAPID TEST, COVR: NOT DETECTED
CREAT SERPL-MCNC: 0.7 MG/DL (ref 0.8–1.5)
CRYSTALS URNS QL MICRO: 0 /LPF
DIFFERENTIAL METHOD BLD: ABNORMAL
EOSINOPHIL # BLD: 0 K/UL (ref 0–0.8)
EOSINOPHIL NFR BLD: 0 % (ref 0.5–7.8)
EPI CELLS #/AREA URNS HPF: ABNORMAL /HPF
ERYTHROCYTE [DISTWIDTH] IN BLOOD BY AUTOMATED COUNT: 18.1 % (ref 11.9–14.6)
GLOBULIN SER CALC-MCNC: 3.2 G/DL (ref 2.3–3.5)
GLUCOSE SERPL-MCNC: 84 MG/DL (ref 65–100)
GLUCOSE UR STRIP.AUTO-MCNC: NEGATIVE MG/DL
HCT VFR BLD AUTO: 37.9 %
HGB BLD-MCNC: 11.5 G/DL (ref 13.6–17.2)
HGB UR QL STRIP: NEGATIVE
IMM GRANULOCYTES # BLD AUTO: 0.1 K/UL (ref 0–0.5)
IMM GRANULOCYTES NFR BLD AUTO: 0 % (ref 0–5)
KETONES UR QL STRIP.AUTO: NEGATIVE MG/DL
LEUKOCYTE ESTERASE UR QL STRIP.AUTO: ABNORMAL
LYMPHOCYTES # BLD: 2.5 K/UL (ref 0.5–4.6)
LYMPHOCYTES NFR BLD: 20 % (ref 13–44)
MAGNESIUM SERPL-MCNC: 1.8 MG/DL (ref 1.8–2.4)
MCH RBC QN AUTO: 25.4 PG (ref 26.1–32.9)
MCHC RBC AUTO-ENTMCNC: 30.3 G/DL (ref 31.4–35)
MCV RBC AUTO: 83.8 FL (ref 79.6–97.8)
MONOCYTES # BLD: 1.4 K/UL (ref 0.1–1.3)
MONOCYTES NFR BLD: 11 % (ref 4–12)
MUCOUS THREADS URNS QL MICRO: ABNORMAL /LPF
NEUTS SEG # BLD: 8.4 K/UL (ref 1.7–8.2)
NEUTS SEG NFR BLD: 68 % (ref 43–78)
NITRITE UR QL STRIP.AUTO: NEGATIVE
NRBC # BLD: 0 K/UL (ref 0–0.2)
OTHER OBSERVATIONS,UCOM: ABNORMAL
PH UR STRIP: 7 [PH] (ref 5–9)
PLATELET # BLD AUTO: 393 K/UL (ref 150–450)
PMV BLD AUTO: 9.5 FL (ref 9.4–12.3)
POTASSIUM SERPL-SCNC: 3 MMOL/L (ref 3.5–5.1)
PROT SERPL-MCNC: 6.7 G/DL (ref 6.3–8.2)
PROT UR STRIP-MCNC: 30 MG/DL
RBC # BLD AUTO: 4.52 M/UL (ref 4.23–5.6)
RBC #/AREA URNS HPF: 0 /HPF
SARS-COV-2, COV2: NORMAL
SODIUM SERPL-SCNC: 138 MMOL/L (ref 136–145)
SOURCE, COVRS: NORMAL
SP GR UR REFRACTOMETRY: 1.02 (ref 1–1.02)
UA: UC IF INDICATED,UAUC: ABNORMAL
UROBILINOGEN UR QL STRIP.AUTO: 0.2 EU/DL (ref 0.2–1)
WBC # BLD AUTO: 12.3 K/UL (ref 4.3–11.1)
WBC URNS QL MICRO: ABNORMAL /HPF

## 2021-04-21 PROCEDURE — 82784 ASSAY IGA/IGD/IGG/IGM EACH: CPT

## 2021-04-21 PROCEDURE — 83883 ASSAY NEPHELOMETRY NOT SPEC: CPT

## 2021-04-21 PROCEDURE — 36415 COLL VENOUS BLD VENIPUNCTURE: CPT

## 2021-04-21 PROCEDURE — 87088 URINE BACTERIA CULTURE: CPT

## 2021-04-21 PROCEDURE — 85025 COMPLETE CBC W/AUTO DIFF WBC: CPT

## 2021-04-21 PROCEDURE — 87086 URINE CULTURE/COLONY COUNT: CPT

## 2021-04-21 PROCEDURE — 87186 SC STD MICRODIL/AGAR DIL: CPT

## 2021-04-21 PROCEDURE — 87804 INFLUENZA ASSAY W/OPTIC: CPT

## 2021-04-21 PROCEDURE — 83735 ASSAY OF MAGNESIUM: CPT

## 2021-04-21 PROCEDURE — 86334 IMMUNOFIX E-PHORESIS SERUM: CPT

## 2021-04-21 PROCEDURE — 81001 URINALYSIS AUTO W/SCOPE: CPT

## 2021-04-21 PROCEDURE — 80053 COMPREHEN METABOLIC PANEL: CPT

## 2021-04-21 PROCEDURE — 87635 SARS-COV-2 COVID-19 AMP PRB: CPT

## 2021-04-21 PROCEDURE — U0003 INFECTIOUS AGENT DETECTION BY NUCLEIC ACID (DNA OR RNA); SEVERE ACUTE RESPIRATORY SYNDROME CORONAVIRUS 2 (SARS-COV-2) (CORONAVIRUS DISEASE [COVID-19]), AMPLIFIED PROBE TECHNIQUE, MAKING USE OF HIGH THROUGHPUT TECHNOLOGIES AS DESCRIBED BY CMS-2020-01-R: HCPCS

## 2021-04-21 NOTE — PROGRESS NOTES
4/21   Pt came in for his HD eval with Dr. Fadia Rodriguez. Pt is eager to start his transplant. Oral antibiotics were prescribed for pt to  in the pharmacy.  Pt will get his Melphalan tomorrow and cells back on 4/23

## 2021-04-22 ENCOUNTER — HOSPITAL ENCOUNTER (OUTPATIENT)
Dept: INFUSION THERAPY | Age: 56
Discharge: HOME | End: 2021-04-22
Payer: MEDICARE

## 2021-04-22 VITALS
SYSTOLIC BLOOD PRESSURE: 135 MMHG | RESPIRATION RATE: 16 BRPM | OXYGEN SATURATION: 93 % | HEART RATE: 110 BPM | TEMPERATURE: 98 F | DIASTOLIC BLOOD PRESSURE: 81 MMHG | WEIGHT: 178 LBS | BODY MASS INDEX: 25.54 KG/M2

## 2021-04-22 DIAGNOSIS — C90.00 MULTIPLE MYELOMA NOT HAVING ACHIEVED REMISSION (HCC): Primary | ICD-10-CM

## 2021-04-22 LAB
ANION GAP SERPL CALC-SCNC: 7 MMOL/L (ref 7–16)
BASOPHILS # BLD: 0.1 K/UL (ref 0–0.2)
BASOPHILS NFR BLD: 1 % (ref 0–2)
BUN SERPL-MCNC: 6 MG/DL (ref 6–23)
CALCIUM SERPL-MCNC: 8.6 MG/DL (ref 8.3–10.4)
CHLORIDE SERPL-SCNC: 101 MMOL/L (ref 98–107)
CO2 SERPL-SCNC: 33 MMOL/L (ref 21–32)
CREAT SERPL-MCNC: 0.8 MG/DL (ref 0.8–1.5)
DIFFERENTIAL METHOD BLD: ABNORMAL
EOSINOPHIL # BLD: 0.2 K/UL (ref 0–0.8)
EOSINOPHIL NFR BLD: 2 % (ref 0.5–7.8)
ERYTHROCYTE [DISTWIDTH] IN BLOOD BY AUTOMATED COUNT: 17.9 % (ref 11.9–14.6)
GLUCOSE SERPL-MCNC: 116 MG/DL (ref 65–100)
HCT VFR BLD AUTO: 35.8 %
HGB BLD-MCNC: 11 G/DL (ref 13.6–17.2)
IMM GRANULOCYTES # BLD AUTO: 0.1 K/UL (ref 0–0.5)
IMM GRANULOCYTES NFR BLD AUTO: 1 % (ref 0–5)
KAPPA LC FREE SER-MCNC: 300.7 MG/L (ref 3.3–19.4)
KAPPA LC FREE/LAMBDA FREE SER: ABNORMAL {RATIO} (ref 0.26–1.65)
LAMBDA LC FREE SERPL-MCNC: <1.42 MG/L (ref 5.71–26.3)
LYMPHOCYTES # BLD: 1.7 K/UL (ref 0.5–4.6)
LYMPHOCYTES NFR BLD: 17 % (ref 13–44)
MCH RBC QN AUTO: 25.9 PG (ref 26.1–32.9)
MCHC RBC AUTO-ENTMCNC: 30.7 G/DL (ref 31.4–35)
MCV RBC AUTO: 84.4 FL (ref 79.6–97.8)
MONOCYTES # BLD: 0.9 K/UL (ref 0.1–1.3)
MONOCYTES NFR BLD: 9 % (ref 4–12)
NEUTS SEG # BLD: 7.1 K/UL (ref 1.7–8.2)
NEUTS SEG NFR BLD: 71 % (ref 43–78)
NRBC # BLD: 0 K/UL (ref 0–0.2)
PLATELET # BLD AUTO: 350 K/UL (ref 150–450)
PMV BLD AUTO: 9.6 FL (ref 9.4–12.3)
POTASSIUM SERPL-SCNC: 3.1 MMOL/L (ref 3.5–5.1)
RBC # BLD AUTO: 4.24 M/UL (ref 4.23–5.6)
SARS-COV-2, COV2: NOT DETECTED
SODIUM SERPL-SCNC: 141 MMOL/L (ref 136–145)
SPECIMEN SOURCE, FCOV2M: NORMAL
WBC # BLD AUTO: 10 K/UL (ref 4.3–11.1)

## 2021-04-22 PROCEDURE — 96413 CHEMO IV INFUSION 1 HR: CPT

## 2021-04-22 PROCEDURE — 96375 TX/PRO/DX INJ NEW DRUG ADDON: CPT

## 2021-04-22 PROCEDURE — 74011250636 HC RX REV CODE- 250/636: Performed by: INTERNAL MEDICINE

## 2021-04-22 PROCEDURE — 85025 COMPLETE CBC W/AUTO DIFF WBC: CPT

## 2021-04-22 PROCEDURE — 74011000258 HC RX REV CODE- 258: Performed by: INTERNAL MEDICINE

## 2021-04-22 PROCEDURE — 96367 TX/PROPH/DG ADDL SEQ IV INF: CPT

## 2021-04-22 PROCEDURE — 80048 BASIC METABOLIC PNL TOTAL CA: CPT

## 2021-04-22 RX ORDER — DEXAMETHASONE SODIUM PHOSPHATE 100 MG/10ML
10 INJECTION INTRAMUSCULAR; INTRAVENOUS ONCE
Status: COMPLETED | OUTPATIENT
Start: 2021-04-22 | End: 2021-04-22

## 2021-04-22 RX ORDER — SODIUM CHLORIDE 0.9 % (FLUSH) 0.9 %
10 SYRINGE (ML) INJECTION AS NEEDED
Status: ACTIVE | OUTPATIENT
Start: 2021-04-22 | End: 2021-04-22

## 2021-04-22 RX ORDER — SODIUM CHLORIDE 9 MG/ML
25 INJECTION, SOLUTION INTRAVENOUS CONTINUOUS
Status: ACTIVE | OUTPATIENT
Start: 2021-04-22 | End: 2021-04-22

## 2021-04-22 RX ORDER — ONDANSETRON 2 MG/ML
8 INJECTION INTRAMUSCULAR; INTRAVENOUS ONCE
Status: COMPLETED | OUTPATIENT
Start: 2021-04-22 | End: 2021-04-22

## 2021-04-22 RX ADMIN — DEXAMETHASONE SODIUM PHOSPHATE 10 MG: 10 INJECTION INTRAMUSCULAR; INTRAVENOUS at 09:17

## 2021-04-22 RX ADMIN — FOSAPREPITANT 150 MG: 150 INJECTION, POWDER, LYOPHILIZED, FOR SOLUTION INTRAVENOUS at 09:33

## 2021-04-22 RX ADMIN — SODIUM CHLORIDE 25 ML/HR: 9 INJECTION, SOLUTION INTRAVENOUS at 08:10

## 2021-04-22 RX ADMIN — Medication 10 ML: at 08:05

## 2021-04-22 RX ADMIN — MELPHALAN HYDROCHLORIDE 384 MG: KIT INTRAVENOUS at 10:06

## 2021-04-22 RX ADMIN — Medication 10 ML: at 11:09

## 2021-04-22 RX ADMIN — ONDANSETRON 8 MG: 2 INJECTION INTRAMUSCULAR; INTRAVENOUS at 09:14

## 2021-04-22 NOTE — PROGRESS NOTES
Day -1  Arrived to the [facility]. Melphalan infusion and labs completed. Patient tolerated without problems. Cryotherapy continued constantly throughout infusion. Dr. Tracie Solano visited with patient; No new orders received. Any issues or concerns during appointment: none; patient instructed to begin Amoxicillin for possible UTI; pending cultures. Patient aware of next infusion appointment on 4/23/21 at [address] [place] [address].  Discharged in wheelchair to home with spouse.

## 2021-04-22 NOTE — PROGRESS NOTES
Received verbal notification from Jamal Carlos RN to proceed with Melphalan infusion and to have patient begin regimen of antibiotics for bacteria found in urine.

## 2021-04-23 ENCOUNTER — HOSPITAL ENCOUNTER (OUTPATIENT)
Dept: INFUSION THERAPY | Age: 56
Discharge: HOME | End: 2021-04-23
Payer: MEDICARE

## 2021-04-23 VITALS
BODY MASS INDEX: 26.2 KG/M2 | WEIGHT: 182.6 LBS | DIASTOLIC BLOOD PRESSURE: 80 MMHG | RESPIRATION RATE: 18 BRPM | TEMPERATURE: 97.4 F | OXYGEN SATURATION: 95 % | SYSTOLIC BLOOD PRESSURE: 122 MMHG | HEART RATE: 99 BPM

## 2021-04-23 DIAGNOSIS — C90.00 MULTIPLE MYELOMA NOT HAVING ACHIEVED REMISSION (HCC): Primary | ICD-10-CM

## 2021-04-23 LAB
ALBUMIN SERPL ELPH-MCNC: 3.57 G/DL (ref 3.2–5.6)
ALBUMIN SERPL-MCNC: 3.4 G/DL (ref 3.5–5)
ALBUMIN/GLOB SERPL: 1.1 {RATIO} (ref 1.2–3.5)
ALBUMIN/GLOB SERPL: 1.3 {RATIO}
ALP SERPL-CCNC: 86 U/L (ref 50–136)
ALPHA1 GLOB SERPL ELPH-MCNC: 0.34 G/DL (ref 0.1–0.4)
ALPHA2 GLOB SERPL ELPH-MCNC: 0.83 G/DL (ref 0.4–1.2)
ALT SERPL-CCNC: 33 U/L (ref 12–65)
ANION GAP SERPL CALC-SCNC: 7 MMOL/L (ref 7–16)
AST SERPL-CCNC: 10 U/L (ref 15–37)
B-GLOBULIN SERPL QL ELPH: 0.93 G/DL (ref 0.6–1.3)
BASOPHILS # BLD: 0 K/UL (ref 0–0.2)
BASOPHILS NFR BLD: 0 % (ref 0–2)
BILIRUB SERPL-MCNC: 0.3 MG/DL (ref 0.2–1.1)
BUN SERPL-MCNC: 10 MG/DL (ref 6–23)
CALCIUM SERPL-MCNC: 8.7 MG/DL (ref 8.3–10.4)
CHLORIDE SERPL-SCNC: 102 MMOL/L (ref 98–107)
CO2 SERPL-SCNC: 28 MMOL/L (ref 21–32)
CREAT SERPL-MCNC: 0.7 MG/DL (ref 0.8–1.5)
DIFFERENTIAL METHOD BLD: ABNORMAL
EOSINOPHIL # BLD: 0.2 K/UL (ref 0–0.8)
EOSINOPHIL NFR BLD: 2 % (ref 0.5–7.8)
ERYTHROCYTE [DISTWIDTH] IN BLOOD BY AUTOMATED COUNT: 17.7 % (ref 11.9–14.6)
FLUAV AG NPH QL IA: NEGATIVE
FLUBV AG NPH QL IA: NEGATIVE
GAMMA GLOB MFR SERPL ELPH: 0.72 G/DL (ref 0.5–1.6)
GGT SERPL-CCNC: 56 U/L (ref 15–85)
GLOBULIN SER CALC-MCNC: 3.2 G/DL (ref 2.3–3.5)
GLUCOSE SERPL-MCNC: 127 MG/DL (ref 65–100)
HCT VFR BLD AUTO: 35.7 %
HGB BLD-MCNC: 11.1 G/DL (ref 13.6–17.2)
IGA SERPL-MCNC: 9 MG/DL (ref 85–499)
IGG SERPL-MCNC: 820 MG/DL (ref 610–1616)
IGM SERPL-MCNC: <10 MG/DL (ref 35–242)
IMM GRANULOCYTES # BLD AUTO: 0 K/UL (ref 0–0.5)
IMM GRANULOCYTES NFR BLD AUTO: 0 % (ref 0–5)
LDH SERPL L TO P-CCNC: 147 U/L (ref 100–190)
LYMPHOCYTES # BLD: 0.3 K/UL (ref 0.5–4.6)
LYMPHOCYTES NFR BLD: 3 % (ref 13–44)
M PROTEIN SERPL ELPH-MCNC: 0.44 G/DL
MAGNESIUM SERPL-MCNC: 1.9 MG/DL (ref 1.8–2.4)
MCH RBC QN AUTO: 25.8 PG (ref 26.1–32.9)
MCHC RBC AUTO-ENTMCNC: 31.1 G/DL (ref 31.4–35)
MCV RBC AUTO: 83 FL (ref 79.6–97.8)
MONOCYTES # BLD: 0.4 K/UL (ref 0.1–1.3)
MONOCYTES NFR BLD: 4 % (ref 4–12)
NEUTS SEG # BLD: 8.2 K/UL (ref 1.7–8.2)
NEUTS SEG NFR BLD: 91 % (ref 43–78)
NRBC # BLD: 0 K/UL (ref 0–0.2)
PHOSPHATE SERPL-MCNC: 2.8 MG/DL (ref 2.5–4.5)
PLATELET # BLD AUTO: 340 K/UL (ref 150–450)
PMV BLD AUTO: 9.5 FL (ref 9.4–12.3)
POTASSIUM SERPL-SCNC: 3.8 MMOL/L (ref 3.5–5.1)
PROT PATTERN SERPL ELPH-IMP: ABNORMAL
PROT PATTERN SPEC IFE-IMP: ABNORMAL
PROT SERPL-MCNC: 6.4 G/DL (ref 6.3–8.2)
PROT SERPL-MCNC: 6.6 G/DL (ref 6.3–8.2)
RBC # BLD AUTO: 4.3 M/UL (ref 4.23–5.6)
SODIUM SERPL-SCNC: 137 MMOL/L (ref 136–145)
SPECIMEN SOURCE: NORMAL
WBC # BLD AUTO: 9.1 K/UL (ref 4.3–11.1)

## 2021-04-23 PROCEDURE — 38208 THAW PRESERVED STEM CELLS: CPT

## 2021-04-23 PROCEDURE — 74011000250 HC RX REV CODE- 250: Performed by: INTERNAL MEDICINE

## 2021-04-23 PROCEDURE — 85025 COMPLETE CBC W/AUTO DIFF WBC: CPT

## 2021-04-23 PROCEDURE — 96375 TX/PRO/DX INJ NEW DRUG ADDON: CPT

## 2021-04-23 PROCEDURE — 96374 THER/PROPH/DIAG INJ IV PUSH: CPT

## 2021-04-23 PROCEDURE — 84100 ASSAY OF PHOSPHORUS: CPT

## 2021-04-23 PROCEDURE — 83735 ASSAY OF MAGNESIUM: CPT

## 2021-04-23 PROCEDURE — 96361 HYDRATE IV INFUSION ADD-ON: CPT

## 2021-04-23 PROCEDURE — 82977 ASSAY OF GGT: CPT

## 2021-04-23 PROCEDURE — 83615 LACTATE (LD) (LDH) ENZYME: CPT

## 2021-04-23 PROCEDURE — 36593 DECLOT VASCULAR DEVICE: CPT

## 2021-04-23 PROCEDURE — 80053 COMPREHEN METABOLIC PANEL: CPT

## 2021-04-23 PROCEDURE — 38241 TRANSPLT AUTOL HCT/DONOR: CPT

## 2021-04-23 PROCEDURE — 74011250637 HC RX REV CODE- 250/637: Performed by: INTERNAL MEDICINE

## 2021-04-23 PROCEDURE — 74011250636 HC RX REV CODE- 250/636: Performed by: INTERNAL MEDICINE

## 2021-04-23 RX ORDER — DIPHENHYDRAMINE HYDROCHLORIDE 50 MG/ML
25 INJECTION, SOLUTION INTRAMUSCULAR; INTRAVENOUS ONCE
Status: COMPLETED | OUTPATIENT
Start: 2021-04-23 | End: 2021-04-23

## 2021-04-23 RX ORDER — SODIUM CHLORIDE 0.9 % (FLUSH) 0.9 %
10-40 SYRINGE (ML) INJECTION AS NEEDED
Status: ACTIVE | OUTPATIENT
Start: 2021-04-23 | End: 2021-04-23

## 2021-04-23 RX ORDER — SODIUM CHLORIDE 0.9 % (FLUSH) 0.9 %
10-20 SYRINGE (ML) INJECTION AS NEEDED
Status: CANCELLED | OUTPATIENT
Start: 2021-04-23

## 2021-04-23 RX ORDER — ONDANSETRON 2 MG/ML
8 INJECTION INTRAMUSCULAR; INTRAVENOUS
Status: COMPLETED | OUTPATIENT
Start: 2021-04-23 | End: 2021-04-23

## 2021-04-23 RX ORDER — DEXTROSE, SODIUM CHLORIDE, AND POTASSIUM CHLORIDE 5; .45; .15 G/100ML; G/100ML; G/100ML
1000 INJECTION INTRAVENOUS
Status: COMPLETED | OUTPATIENT
Start: 2021-04-23 | End: 2021-04-23

## 2021-04-23 RX ORDER — LORAZEPAM 2 MG/ML
0.5 INJECTION INTRAMUSCULAR ONCE
Status: COMPLETED | OUTPATIENT
Start: 2021-04-23 | End: 2021-04-23

## 2021-04-23 RX ORDER — ACETAMINOPHEN 325 MG/1
650 TABLET ORAL ONCE
Status: COMPLETED | OUTPATIENT
Start: 2021-04-23 | End: 2021-04-23

## 2021-04-23 RX ADMIN — DIPHENHYDRAMINE HYDROCHLORIDE 25 MG: 50 INJECTION, SOLUTION INTRAMUSCULAR; INTRAVENOUS at 10:08

## 2021-04-23 RX ADMIN — ALTEPLASE 2 MG: 2.2 INJECTION, POWDER, LYOPHILIZED, FOR SOLUTION INTRAVENOUS at 09:38

## 2021-04-23 RX ADMIN — ONDANSETRON 8 MG: 2 INJECTION INTRAMUSCULAR; INTRAVENOUS at 09:36

## 2021-04-23 RX ADMIN — ACETAMINOPHEN 650 MG: 325 TABLET ORAL at 10:06

## 2021-04-23 RX ADMIN — SODIUM CHLORIDE 500 ML: 900 INJECTION, SOLUTION INTRAVENOUS at 11:03

## 2021-04-23 RX ADMIN — LORAZEPAM 0.5 MG: 2 INJECTION INTRAMUSCULAR; INTRAVENOUS at 10:09

## 2021-04-23 RX ADMIN — Medication 10 ML: at 13:37

## 2021-04-23 RX ADMIN — DEXTROSE, SODIUM CHLORIDE, AND POTASSIUM CHLORIDE 1000 ML: 5; .45; .15 INJECTION INTRAVENOUS at 10:10

## 2021-04-23 NOTE — PROGRESS NOTES
Diagnosis: Myeloma  Transplant Date: 4/23/2021  Day: (+/-) 0. Chemo regimen used: Melphalan  Labs needed at next visit: see treatment plan. Labs not resulted that need follow-up: none. Next Appointment scheduled: 4/24 at 0800. BMT assessments and toxicities completed. WBC/ANC= 9.1/8.2. Prophylactic medications started 4/22 (per Dr. Presley Sheffield)   Granix to start on Day +6  Toxicities greater than 2 :none. Bactrim or equivalent to start on Day +30    Patient seen by Dr. Jayy Padilla orders received: none  Issues:  Pt arrived via w/c. No blood return from either lumen of apheresis cath. Labs drawn peripherally. Cathflo instilled into both lumens with positive results. Pt premedicated with Zofran, Tylenol, Benadryl and Ativan. Stem cell reinfusion completed without complications. I liter of D5 1/2 NS + 20 KCL infused. Pt stayed x 1 hour post stem cell reinfusion, VSS. Pt connected to home IVF via CADD pump and discharged via w/c with wife. No distress noted.

## 2021-04-24 ENCOUNTER — HOSPITAL ENCOUNTER (OUTPATIENT)
Dept: INFUSION THERAPY | Age: 56
Discharge: HOME | End: 2021-04-24
Payer: MEDICARE

## 2021-04-24 VITALS
HEART RATE: 110 BPM | TEMPERATURE: 97.9 F | DIASTOLIC BLOOD PRESSURE: 66 MMHG | SYSTOLIC BLOOD PRESSURE: 115 MMHG | WEIGHT: 185.6 LBS | RESPIRATION RATE: 16 BRPM | OXYGEN SATURATION: 96 % | BODY MASS INDEX: 26.63 KG/M2

## 2021-04-24 DIAGNOSIS — C90.00 MULTIPLE MYELOMA NOT HAVING ACHIEVED REMISSION (HCC): Primary | ICD-10-CM

## 2021-04-24 DIAGNOSIS — Z52.3 BONE MARROW DONOR: ICD-10-CM

## 2021-04-24 DIAGNOSIS — R60.0 LOCALIZED EDEMA: ICD-10-CM

## 2021-04-24 DIAGNOSIS — Z94.81 BONE MARROW TRANSPLANT STATUS (HCC): ICD-10-CM

## 2021-04-24 LAB
ANION GAP SERPL CALC-SCNC: 5 MMOL/L (ref 7–16)
BACTERIA SPEC CULT: ABNORMAL
BASOPHILS # BLD: 0 K/UL (ref 0–0.2)
BASOPHILS NFR BLD: 0 % (ref 0–2)
BUN SERPL-MCNC: 14 MG/DL (ref 6–23)
CALCIUM SERPL-MCNC: 9.3 MG/DL (ref 8.3–10.4)
CHLORIDE SERPL-SCNC: 106 MMOL/L (ref 98–107)
CO2 SERPL-SCNC: 29 MMOL/L (ref 21–32)
CREAT SERPL-MCNC: 0.7 MG/DL (ref 0.8–1.5)
DIFFERENTIAL METHOD BLD: ABNORMAL
EOSINOPHIL # BLD: 0 K/UL (ref 0–0.8)
EOSINOPHIL NFR BLD: 0 % (ref 0.5–7.8)
ERYTHROCYTE [DISTWIDTH] IN BLOOD BY AUTOMATED COUNT: 17.7 % (ref 11.9–14.6)
GLUCOSE SERPL-MCNC: 154 MG/DL (ref 65–100)
HCT VFR BLD AUTO: 35.2 %
HGB BLD-MCNC: 10.9 G/DL (ref 13.6–17.2)
IMM GRANULOCYTES # BLD AUTO: 0.1 K/UL (ref 0–0.5)
IMM GRANULOCYTES NFR BLD AUTO: 0 % (ref 0–5)
LYMPHOCYTES # BLD: 0.1 K/UL (ref 0.5–4.6)
LYMPHOCYTES NFR BLD: 1 % (ref 13–44)
MCH RBC QN AUTO: 25.9 PG (ref 26.1–32.9)
MCHC RBC AUTO-ENTMCNC: 31 G/DL (ref 31.4–35)
MCV RBC AUTO: 83.6 FL (ref 79.6–97.8)
MONOCYTES # BLD: 0.1 K/UL (ref 0.1–1.3)
MONOCYTES NFR BLD: 1 % (ref 4–12)
NEUTS SEG # BLD: 11.1 K/UL (ref 1.7–8.2)
NEUTS SEG NFR BLD: 98 % (ref 43–78)
NRBC # BLD: 0 K/UL (ref 0–0.2)
PLATELET # BLD AUTO: 341 K/UL (ref 150–450)
PMV BLD AUTO: 9.8 FL (ref 9.4–12.3)
POTASSIUM SERPL-SCNC: 4 MMOL/L (ref 3.5–5.1)
RBC # BLD AUTO: 4.21 M/UL (ref 4.23–5.6)
SERVICE CMNT-IMP: ABNORMAL
SODIUM SERPL-SCNC: 140 MMOL/L (ref 136–145)
WBC # BLD AUTO: 11.4 K/UL (ref 4.3–11.1)

## 2021-04-24 PROCEDURE — 74011250637 HC RX REV CODE- 250/637: Performed by: INTERNAL MEDICINE

## 2021-04-24 PROCEDURE — 96374 THER/PROPH/DIAG INJ IV PUSH: CPT

## 2021-04-24 PROCEDURE — 80048 BASIC METABOLIC PNL TOTAL CA: CPT

## 2021-04-24 PROCEDURE — 74011250636 HC RX REV CODE- 250/636

## 2021-04-24 PROCEDURE — 85025 COMPLETE CBC W/AUTO DIFF WBC: CPT

## 2021-04-24 PROCEDURE — 99213 OFFICE O/P EST LOW 20 MIN: CPT | Performed by: INTERNAL MEDICINE

## 2021-04-24 PROCEDURE — 74011250636 HC RX REV CODE- 250/636: Performed by: INTERNAL MEDICINE

## 2021-04-24 PROCEDURE — 96375 TX/PRO/DX INJ NEW DRUG ADDON: CPT

## 2021-04-24 RX ORDER — ONDANSETRON 2 MG/ML
8 INJECTION INTRAMUSCULAR; INTRAVENOUS
Status: COMPLETED | OUTPATIENT
Start: 2021-04-24 | End: 2021-04-24

## 2021-04-24 RX ORDER — FUROSEMIDE 10 MG/ML
20 INJECTION INTRAMUSCULAR; INTRAVENOUS ONCE
Status: COMPLETED | OUTPATIENT
Start: 2021-04-24 | End: 2021-04-24

## 2021-04-24 RX ORDER — SODIUM CHLORIDE 0.9 % (FLUSH) 0.9 %
10-20 SYRINGE (ML) INJECTION AS NEEDED
Status: DISCONTINUED | OUTPATIENT
Start: 2021-04-24 | End: 2021-04-26 | Stop reason: HOSPADM

## 2021-04-24 RX ORDER — DEXAMETHASONE SODIUM PHOSPHATE 100 MG/10ML
10 INJECTION INTRAMUSCULAR; INTRAVENOUS ONCE
Status: DISPENSED | OUTPATIENT
Start: 2021-04-24 | End: 2021-04-24

## 2021-04-24 RX ORDER — SODIUM CHLORIDE 9 MG/ML
25 INJECTION, SOLUTION INTRAVENOUS ONCE
Status: ACTIVE | OUTPATIENT
Start: 2021-04-24 | End: 2021-04-24

## 2021-04-24 RX ORDER — SODIUM CHLORIDE 0.9 % (FLUSH) 0.9 %
10 SYRINGE (ML) INJECTION AS NEEDED
Status: CANCELLED | OUTPATIENT
Start: 2021-04-24

## 2021-04-24 RX ORDER — DEXTROSE, SODIUM CHLORIDE, AND POTASSIUM CHLORIDE 5; .45; .15 G/100ML; G/100ML; G/100ML
1000 INJECTION INTRAVENOUS
Status: DISCONTINUED | OUTPATIENT
Start: 2021-04-24 | End: 2021-04-24

## 2021-04-24 RX ORDER — POTASSIUM CHLORIDE 750 MG/1
40 TABLET, EXTENDED RELEASE ORAL
Status: COMPLETED | OUTPATIENT
Start: 2021-04-24 | End: 2021-04-24

## 2021-04-24 RX ADMIN — FUROSEMIDE 20 MG: 10 INJECTION, SOLUTION INTRAMUSCULAR; INTRAVENOUS at 09:35

## 2021-04-24 RX ADMIN — Medication 20 ML: at 09:45

## 2021-04-24 RX ADMIN — POTASSIUM CHLORIDE 40 MEQ: 10 TABLET, EXTENDED RELEASE ORAL at 09:41

## 2021-04-24 RX ADMIN — ONDANSETRON 8 MG: 2 INJECTION INTRAMUSCULAR; INTRAVENOUS at 08:25

## 2021-04-24 NOTE — PROGRESS NOTES
Diagnosis: Multiple Myeloma  Transplant Date: 4/23/21  Day: + 1  Chemo regimen used: Melphalan  Labs needed at next visit: see orders. Labs not resulted that need follow-up: none. Next Appointment scheduled: 4/25 @ 0830  BMT assessments and toxicities completed. WBC/ANC= 11.4/11.1  Prophylactic medications started Day +0 (patient initiated) and stopped. Granix to begin on Day +6   Toxicities greater than 2: none  Bactrim or equivalent to be initiated on Day +30. Patient seen by MD: Thad Chadwick orders received: none. Issues: swelling r/t overnight fluids    Arrived to the [facility] via wheelchair. Continuous IVF removed. Patient reports generalized swelling and nausea. Labs drawn and reviewed. No IVF administered per Dr. Fadi Rogel due to weight gain/swelling. Zofran IV administered. Orders received for 20 mg IV Lasix and 40 mEq KCL PO. Patient tolerated well. Patient discharged via wheelchair. Appointment time confirmed with patient.      Latrice Sharpe RN

## 2021-04-24 NOTE — PROGRESS NOTES
[facility]:  Progress Note    Admission Date: 2021  7:52 AM    SUBJECTIVE:    Diagnosis: Myeloma  Transplant Date: 2021  Day: (+/-) +1  Chemo regimen used: Melphalan  Next Appointment scheduled:   WBC/ANC= 11.4/ANC 11.1  Prophylactic medications started  (per Dr. Sergio Fox)   Granix to start on Day +6  Toxicities greater than 2 :none. Bactrim or equivalent to start on Day +30      Pt here with his wife. Reports edema in LE and feeling bloated. Asking if I can give him something for his swelling. S/p IVF yesterday. Had three BPs (soft) since yesterday. No watery diarrhea. No fevers/chills at home. No distress noted. ROS:  Constitutional: negative for fever, chills, weakness, malaise, fatigue. CV: negative for chest pain, palpitations, + mild edema. Respiratory: negative for dyspnea, cough, wheezing. GI: negative for nausea, abdominal pain. 10 point review of systems is otherwise negative with the exception of the elements mentioned above in the HPI. No Known Allergies    OBJECTIVE:  No data found. Temp (24hrs), Av.9 °F (36.6 °C), Min:97.9 °F (36.6 °C), Max:97.9 °F (36.6 °C)    No intake/output data recorded. Physical Exam:  Constitutional: ECOG - 0  Well developed, well nourished male in no acute distress, sitting comfortably in the hospital chair. HEENT: Normocephalic and atraumatic. Oropharynx is clear, mucous membranes are moist.  Pupils are equal, round, and reactive to light. Extraocular muscles are intact. Sclerae anicteric. Lymph node   No palpable submandibular, cervical, supraclavicular lymph nodes. Skin Warm and dry. No bruising and no rash noted. No erythema. No pallor. Respiratory Lungs are clear to auscultation bilaterally without wheezes, rales or rhonchi, normal air exchange without accessory muscle use. CVS Normal rate, regular rhythm and normal S1 and S2.   No murmur   Abdomen Soft, nontender, mildly distended, normoactive bowel sounds. Neuro Grossly nonfocal with no obvious sensory or motor deficits. MSK Normal range of motion in general.  + mild edema at ankles and no tenderness. Psych Appropriate mood and affect. Labs:      Recent Labs     04/24/21  0814 04/23/21  0907 04/22/21  0817   WBC 11.4* 9.1 10.0   RBC 4.21* 4.30 4.24   HGB 10.9* 11.1* 11.0*   HCT 35.2 35.7 35.8   MCV 83.6 83.0 84.4   MCH 25.9* 25.8* 25.9*   MCHC 31.0* 31.1* 30.7*   RDW 17.7* 17.7* 17.9*    340 350   GRANS 98* 91* 71   LYMPH 1* 3* 17   MONOS 1* 4 9   EOS 0* 2 2   BASOS 0 0 1   IG 0 0 1   DF AUTOMATED AUTOMATED AUTOMATED   ANEU 11.1* 8.2 7.1   ABL 0.1* 0.3* 1.7   ABM 0.1 0.4 0.9   CHER 0.0 0.2 0.2   ABB 0.0 0.0 0.1   AIG 0.1 0.0 0.1        Recent Labs     04/24/21  0814 04/23/21  0907 04/22/21  0817    137 141   K 4.0 3.8 3.1*    102 101   CO2 29 28 33*   AGAP 5* 7 7   * 127* 116*   BUN 14 10 6   CREA 0.70* 0.70* 0.80   GFRAA >60 >60 >60   GFRNA >60 >60 >60   CA 9.3 8.7 8.6   AP  --  86  --    TP  --  6.6  --    ALB  --  3.4*  --    GLOB  --  3.2  --    AGRAT  --  1.1*  --    MG  --  1.9  --    PHOS  --  2.8  --        Imaging:      ASSESSMENT:    Active Problems:    * No active hospital problems. *    Multiple Myeloma, Kappa light chain, del.13q, t( 11;14 ), ISS Stage 3; at diagnosis his Kappa level was 5824, he received 4 cycles of RVd and achieved a VGPR, his Kappa level decreased to 285. Day +1 - Autologous PBHCT     - overall doing well; will dose 20Iv furosemide and 40meq K   - continue to monitor BMs (had 3 since yesterday) with some abd distention. No pain/blood or mucus in stools. RTC tomorrow. Pt knows to call with any issues/concerns in the interim. Agreed with plan above.           Wyatt Velásquez MD  [facility]  [address] [place] [address]  Office : [phone]  Fax : [phone]

## 2021-04-25 ENCOUNTER — HOSPITAL ENCOUNTER (OUTPATIENT)
Dept: INFUSION THERAPY | Age: 56
Discharge: HOME | End: 2021-04-25
Payer: MEDICARE

## 2021-04-25 VITALS
WEIGHT: 182.4 LBS | DIASTOLIC BLOOD PRESSURE: 75 MMHG | SYSTOLIC BLOOD PRESSURE: 118 MMHG | RESPIRATION RATE: 16 BRPM | TEMPERATURE: 98.7 F | HEART RATE: 113 BPM | OXYGEN SATURATION: 95 % | BODY MASS INDEX: 26.17 KG/M2

## 2021-04-25 DIAGNOSIS — R60.0 LEG EDEMA: ICD-10-CM

## 2021-04-25 DIAGNOSIS — C90.00 MULTIPLE MYELOMA NOT HAVING ACHIEVED REMISSION (HCC): Primary | ICD-10-CM

## 2021-04-25 LAB
ANION GAP SERPL CALC-SCNC: 6 MMOL/L (ref 7–16)
BASOPHILS # BLD: 0 K/UL (ref 0–0.2)
BASOPHILS NFR BLD: 0 % (ref 0–2)
BUN SERPL-MCNC: 11 MG/DL (ref 6–23)
CALCIUM SERPL-MCNC: 9.5 MG/DL (ref 8.3–10.4)
CHLORIDE SERPL-SCNC: 103 MMOL/L (ref 98–107)
CO2 SERPL-SCNC: 28 MMOL/L (ref 21–32)
CREAT SERPL-MCNC: 0.7 MG/DL (ref 0.8–1.5)
DIFFERENTIAL METHOD BLD: ABNORMAL
EOSINOPHIL # BLD: 0 K/UL (ref 0–0.8)
EOSINOPHIL NFR BLD: 0 % (ref 0.5–7.8)
ERYTHROCYTE [DISTWIDTH] IN BLOOD BY AUTOMATED COUNT: 17.4 % (ref 11.9–14.6)
GLUCOSE SERPL-MCNC: 126 MG/DL (ref 65–100)
HCT VFR BLD AUTO: 35 %
HGB BLD-MCNC: 10.9 G/DL (ref 13.6–17.2)
IMM GRANULOCYTES # BLD AUTO: 0 K/UL (ref 0–0.5)
IMM GRANULOCYTES NFR BLD AUTO: 0 % (ref 0–5)
LYMPHOCYTES # BLD: 0.1 K/UL (ref 0.5–4.6)
LYMPHOCYTES NFR BLD: 1 % (ref 13–44)
MCH RBC QN AUTO: 25.9 PG (ref 26.1–32.9)
MCHC RBC AUTO-ENTMCNC: 31.1 G/DL (ref 31.4–35)
MCV RBC AUTO: 83.1 FL (ref 79.6–97.8)
MONOCYTES # BLD: 0 K/UL (ref 0.1–1.3)
MONOCYTES NFR BLD: 1 % (ref 4–12)
NEUTS SEG # BLD: 6.1 K/UL (ref 1.7–8.2)
NEUTS SEG NFR BLD: 98 % (ref 43–78)
NRBC # BLD: 0 K/UL (ref 0–0.2)
PLATELET # BLD AUTO: 290 K/UL (ref 150–450)
PMV BLD AUTO: 9.2 FL (ref 9.4–12.3)
POTASSIUM SERPL-SCNC: 4.2 MMOL/L (ref 3.5–5.1)
RBC # BLD AUTO: 4.21 M/UL (ref 4.23–5.6)
SODIUM SERPL-SCNC: 137 MMOL/L (ref 136–145)
WBC # BLD AUTO: 6.2 K/UL (ref 4.3–11.1)

## 2021-04-25 PROCEDURE — 80048 BASIC METABOLIC PNL TOTAL CA: CPT

## 2021-04-25 PROCEDURE — 85025 COMPLETE CBC W/AUTO DIFF WBC: CPT

## 2021-04-25 PROCEDURE — 96374 THER/PROPH/DIAG INJ IV PUSH: CPT

## 2021-04-25 PROCEDURE — 96375 TX/PRO/DX INJ NEW DRUG ADDON: CPT

## 2021-04-25 PROCEDURE — 74011250637 HC RX REV CODE- 250/637: Performed by: INTERNAL MEDICINE

## 2021-04-25 PROCEDURE — 99213 OFFICE O/P EST LOW 20 MIN: CPT | Performed by: INTERNAL MEDICINE

## 2021-04-25 PROCEDURE — 74011250636 HC RX REV CODE- 250/636: Performed by: INTERNAL MEDICINE

## 2021-04-25 RX ORDER — SODIUM CHLORIDE 0.9 % (FLUSH) 0.9 %
10 SYRINGE (ML) INJECTION AS NEEDED
Status: DISCONTINUED | OUTPATIENT
Start: 2021-04-25 | End: 2021-04-27 | Stop reason: HOSPADM

## 2021-04-25 RX ORDER — FUROSEMIDE 10 MG/ML
20 INJECTION INTRAMUSCULAR; INTRAVENOUS ONCE
Status: COMPLETED | OUTPATIENT
Start: 2021-04-25 | End: 2021-04-25

## 2021-04-25 RX ORDER — POTASSIUM CHLORIDE 750 MG/1
40 TABLET, EXTENDED RELEASE ORAL
Status: COMPLETED | OUTPATIENT
Start: 2021-04-25 | End: 2021-04-25

## 2021-04-25 RX ORDER — ONDANSETRON 2 MG/ML
8 INJECTION INTRAMUSCULAR; INTRAVENOUS
Status: COMPLETED | OUTPATIENT
Start: 2021-04-25 | End: 2021-04-25

## 2021-04-25 RX ADMIN — ONDANSETRON 8 MG: 2 INJECTION INTRAMUSCULAR; INTRAVENOUS at 09:06

## 2021-04-25 RX ADMIN — Medication 10 ML: at 09:07

## 2021-04-25 RX ADMIN — FUROSEMIDE 20 MG: 10 INJECTION, SOLUTION INTRAMUSCULAR; INTRAVENOUS at 10:18

## 2021-04-25 RX ADMIN — POTASSIUM CHLORIDE 40 MEQ: 750 TABLET, EXTENDED RELEASE ORAL at 10:16

## 2021-04-25 RX ADMIN — Medication 10 ML: at 10:21

## 2021-04-25 NOTE — PROGRESS NOTES
Arrived to the [facility]. Assessment completed. Labs drawn. Zofran given for nausea. 20 mg Lasix given IV. 40 meq K+ given oral. Patient tolerated well. Any issues or concerns during appointment: none. Patient aware of next infusion appointment on 04/26/2021(date) at 0800 (time). Discharged in wheelchair with spouse.

## 2021-04-25 NOTE — PROGRESS NOTES
Problem: Patient Education:  Go to Education Activity  Goal: Patient/Family Education  Outcome: Progressing Towards Goal     Problem: Knowledge Deficit  Goal: *Verbalizes understanding of procedures and medications  Outcome: Progressing Towards Goal

## 2021-04-25 NOTE — PROGRESS NOTES
[facility] Hematology and Oncology:  Progress Note    Admission Date: 2021  8:30 AM    SUBJECTIVE:    Diagnosis: Myeloma  Transplant Date: 2021  Day: (+/-) +2  Chemo regimen used: Melphalan  Next Appointment scheduled:   WBC/ANC= 6.2/ANC 6.1  Prophylactic medications started  (per Dr. Presley Sheffield)   Granix to start on Day +6  Toxicities greater than 2 :none. Bactrim or equivalent to start on Day +30      Pt here with his wife. Feeling ok this am.  Edema in LE improved but not yet resolved. Will dose furosemide today with K.  Abd bloating better. +flatus. BMs have slowed. No fevers/chills at home. No distress noted. ROS:  Constitutional: negative for fever, chills, weakness, malaise, fatigue. CV: negative for chest pain, palpitations, + mild edema. Respiratory: negative for dyspnea, cough, wheezing. GI: negative for nausea, abdominal pain. 10 point review of systems is otherwise negative with the exception of the elements mentioned above in the HPI. No Known Allergies    OBJECTIVE:  No data found. Temp (24hrs), Av.7 °F (37.1 °C), Min:98.7 °F (37.1 °C), Max:98.7 °F (37.1 °C)    No intake/output data recorded. Physical Exam:  Constitutional: ECOG - 0  Well developed, well nourished male in no acute distress, sitting comfortably in the hospital chair. HEENT: Normocephalic and atraumatic. Oropharynx is clear, mucous membranes are moist.  Pupils are equal, round, and reactive to light. Extraocular muscles are intact. Sclerae anicteric. Lymph node   No palpable submandibular, cervical, supraclavicular lymph nodes. Skin Warm and dry. No bruising and no rash noted. No erythema. No pallor. Respiratory Lungs are clear to auscultation bilaterally without wheezes, rales or rhonchi, normal air exchange without accessory muscle use. CVS Normal rate, regular rhythm and normal S1 and S2. No murmur   Abdomen Soft, nontender, mildly distended, normoactive bowel sounds. Neuro Grossly nonfocal with no obvious sensory or motor deficits. MSK Normal range of motion in general.  + mild edema at ankles and no tenderness. Psych Appropriate mood and affect. Labs:      Recent Labs     04/25/21  0853 04/24/21  0814 04/23/21  0907   WBC 6.2 11.4* 9.1   RBC 4.21* 4.21* 4.30   HGB 10.9* 10.9* 11.1*   HCT 35.0 35.2 35.7   MCV 83.1 83.6 83.0   MCH 25.9* 25.9* 25.8*   MCHC 31.1* 31.0* 31.1*   RDW 17.4* 17.7* 17.7*    341 340   GRANS 98* 98* 91*   LYMPH 1* 1* 3*   MONOS 1* 1* 4   EOS 0* 0* 2   BASOS 0 0 0   IG 0 0 0   DF AUTOMATED AUTOMATED AUTOMATED   ANEU 6.1 11.1* 8.2   ABL 0.1* 0.1* 0.3*   ABM 0.0* 0.1 0.4   CHER 0.0 0.0 0.2   ABB 0.0 0.0 0.0   AIG 0.0 0.1 0.0        Recent Labs     04/25/21  0853 04/24/21  0814 04/23/21  0907    140 137   K 4.2 4.0 3.8    106 102   CO2 28 29 28   AGAP 6* 5* 7   * 154* 127*   BUN 11 14 10   CREA 0.70* 0.70* 0.70*   GFRAA >60 >60 >60   GFRNA >60 >60 >60   CA 9.5 9.3 8.7   AP  --   --  86   TP  --   --  6.6   ALB  --   --  3.4*   GLOB  --   --  3.2   AGRAT  --   --  1.1*   MG  --   --  1.9   PHOS  --   --  2.8       Imaging:      ASSESSMENT:    Active Problems:    * No active hospital problems. *    Multiple Myeloma, Kappa light chain, del.13q, t( 11;14 ), ISS Stage 3; at diagnosis his Kappa level was 5824, he received 4 cycles of RVd and achieved a VGPR, his Kappa level decreased to 285. Day +2 - Autologous PBHCT     - overall doing well; will dose 20Iv furosemide and 40meq K - re-assess tomorrow   - dose of anti-emetics. Mild nausea this am.  Reviewed meds to take at home. - no recurrent frequent stool since yesterday. Encouraged mobility and po fluid intake. Some abd distention that is improved since yesterday. RTC tomorrow. Pt knows to call with any issues/concerns in the interim. Agreed with plan above.           Mana Caballero MD  [facility] Hematology and Oncology  [address] [place] [id number]  Office : [phone]  Fax : [phone]

## 2021-04-26 ENCOUNTER — HOSPITAL ENCOUNTER (OUTPATIENT)
Dept: INFUSION THERAPY | Age: 56
Discharge: HOME | End: 2021-04-26
Payer: MEDICARE

## 2021-04-26 VITALS
RESPIRATION RATE: 18 BRPM | WEIGHT: 174.8 LBS | OXYGEN SATURATION: 99 % | DIASTOLIC BLOOD PRESSURE: 68 MMHG | TEMPERATURE: 98.4 F | HEART RATE: 110 BPM | SYSTOLIC BLOOD PRESSURE: 127 MMHG | BODY MASS INDEX: 25.08 KG/M2

## 2021-04-26 DIAGNOSIS — C90.00 MULTIPLE MYELOMA NOT HAVING ACHIEVED REMISSION (HCC): Primary | ICD-10-CM

## 2021-04-26 LAB
ALBUMIN SERPL-MCNC: 3.7 G/DL (ref 3.5–5)
ALBUMIN/GLOB SERPL: 1.2 {RATIO} (ref 1.2–3.5)
ALP SERPL-CCNC: 83 U/L (ref 50–136)
ALT SERPL-CCNC: 37 U/L (ref 12–65)
ANION GAP SERPL CALC-SCNC: 8 MMOL/L (ref 7–16)
AST SERPL-CCNC: 15 U/L (ref 15–37)
BASOPHILS # BLD: 0 K/UL (ref 0–0.2)
BASOPHILS NFR BLD: 0 % (ref 0–2)
BILIRUB SERPL-MCNC: 0.7 MG/DL (ref 0.2–1.1)
BUN SERPL-MCNC: 14 MG/DL (ref 6–23)
CALCIUM SERPL-MCNC: 9.8 MG/DL (ref 8.3–10.4)
CHLORIDE SERPL-SCNC: 98 MMOL/L (ref 98–107)
CO2 SERPL-SCNC: 28 MMOL/L (ref 21–32)
CREAT SERPL-MCNC: 0.8 MG/DL (ref 0.8–1.5)
DIFFERENTIAL METHOD BLD: ABNORMAL
EOSINOPHIL # BLD: 0 K/UL (ref 0–0.8)
EOSINOPHIL NFR BLD: 0 % (ref 0.5–7.8)
ERYTHROCYTE [DISTWIDTH] IN BLOOD BY AUTOMATED COUNT: 17.1 % (ref 11.9–14.6)
GGT SERPL-CCNC: 75 U/L (ref 15–85)
GLOBULIN SER CALC-MCNC: 3.2 G/DL (ref 2.3–3.5)
GLUCOSE SERPL-MCNC: 147 MG/DL (ref 65–100)
HCT VFR BLD AUTO: 37 %
HGB BLD-MCNC: 11.6 G/DL (ref 13.6–17.2)
IMM GRANULOCYTES # BLD AUTO: 0 K/UL (ref 0–0.5)
IMM GRANULOCYTES NFR BLD AUTO: 0 % (ref 0–5)
LDH SERPL L TO P-CCNC: 252 U/L (ref 100–190)
LYMPHOCYTES # BLD: 0.1 K/UL (ref 0.5–4.6)
LYMPHOCYTES NFR BLD: 1 % (ref 13–44)
MAGNESIUM SERPL-MCNC: 2 MG/DL (ref 1.8–2.4)
MCH RBC QN AUTO: 25.7 PG (ref 26.1–32.9)
MCHC RBC AUTO-ENTMCNC: 31.4 G/DL (ref 31.4–35)
MCV RBC AUTO: 82 FL (ref 79.6–97.8)
MONOCYTES # BLD: 0 K/UL (ref 0.1–1.3)
MONOCYTES NFR BLD: 0 % (ref 4–12)
NEUTS SEG # BLD: 8.6 K/UL (ref 1.7–8.2)
NEUTS SEG NFR BLD: 99 % (ref 43–78)
NRBC # BLD: 0 K/UL (ref 0–0.2)
PHOSPHATE SERPL-MCNC: 3.8 MG/DL (ref 2.5–4.5)
PLATELET # BLD AUTO: 291 K/UL (ref 150–450)
PMV BLD AUTO: 9.6 FL (ref 9.4–12.3)
POTASSIUM SERPL-SCNC: 4.3 MMOL/L (ref 3.5–5.1)
PROT SERPL-MCNC: 6.9 G/DL (ref 6.3–8.2)
RBC # BLD AUTO: 4.51 M/UL (ref 4.23–5.6)
SODIUM SERPL-SCNC: 134 MMOL/L (ref 136–145)
WBC # BLD AUTO: 8.7 K/UL (ref 4.3–11.1)

## 2021-04-26 PROCEDURE — 80053 COMPREHEN METABOLIC PANEL: CPT

## 2021-04-26 PROCEDURE — 83615 LACTATE (LD) (LDH) ENZYME: CPT

## 2021-04-26 PROCEDURE — 84100 ASSAY OF PHOSPHORUS: CPT

## 2021-04-26 PROCEDURE — 74011250636 HC RX REV CODE- 250/636: Performed by: INTERNAL MEDICINE

## 2021-04-26 PROCEDURE — 82977 ASSAY OF GGT: CPT

## 2021-04-26 PROCEDURE — 83735 ASSAY OF MAGNESIUM: CPT

## 2021-04-26 PROCEDURE — 85025 COMPLETE CBC W/AUTO DIFF WBC: CPT

## 2021-04-26 PROCEDURE — 96374 THER/PROPH/DIAG INJ IV PUSH: CPT

## 2021-04-26 RX ORDER — SODIUM CHLORIDE 0.9 % (FLUSH) 0.9 %
10-40 SYRINGE (ML) INJECTION EVERY 8 HOURS
Status: CANCELLED | OUTPATIENT
Start: 2021-04-26

## 2021-04-26 RX ORDER — ONDANSETRON 2 MG/ML
8 INJECTION INTRAMUSCULAR; INTRAVENOUS
Status: COMPLETED | OUTPATIENT
Start: 2021-04-26 | End: 2021-04-26

## 2021-04-26 RX ORDER — SODIUM CHLORIDE 0.9 % (FLUSH) 0.9 %
10 SYRINGE (ML) INJECTION AS NEEDED
Status: DISCONTINUED | OUTPATIENT
Start: 2021-04-26 | End: 2021-04-28 | Stop reason: HOSPADM

## 2021-04-26 RX ADMIN — Medication 20 ML: at 09:55

## 2021-04-26 RX ADMIN — ONDANSETRON 8 MG: 2 INJECTION INTRAMUSCULAR; INTRAVENOUS at 08:07

## 2021-04-26 NOTE — PROGRESS NOTES
Diagnosis: Myeloma  Transplant Date: 4/23/2021  Day: (+/-) +3. Chemo regimen used: Melphalan  Labs needed at next visit: see treatment plan. Labs not resulted that need follow-up: none. Next Appointment scheduled: 4/27 at 0830. BMT assessments and toxicities completed. WBC/ANC= 8.7/8.6. Prophylactic medications started 4/22 (per Dr. Dyllan Light)   Granix to start on Day +6  Toxicities greater than 2 :none. Bactrim or equivalent to start on Day +30    Patient seen by Dr. Becky Smith orders received: none  Issues:  Pt arrived via w/c. Labs via apheresis cath. .  Pt medicated with Zofran for C/O nausea. No replacements needed today. Pt had 8lb weight loss. Pt dose not want any fluids today, Dr Dyllan Light aware. discharged via w/c with wife. No distress noted.

## 2021-04-27 ENCOUNTER — HOSPITAL ENCOUNTER (OUTPATIENT)
Dept: INFUSION THERAPY | Age: 56
Discharge: HOME | End: 2021-04-27
Payer: MEDICARE

## 2021-04-27 VITALS
OXYGEN SATURATION: 96 % | TEMPERATURE: 98.2 F | RESPIRATION RATE: 16 BRPM | DIASTOLIC BLOOD PRESSURE: 84 MMHG | WEIGHT: 171 LBS | BODY MASS INDEX: 24.54 KG/M2 | HEART RATE: 120 BPM | SYSTOLIC BLOOD PRESSURE: 124 MMHG

## 2021-04-27 DIAGNOSIS — C90.00 MULTIPLE MYELOMA NOT HAVING ACHIEVED REMISSION (HCC): Primary | ICD-10-CM

## 2021-04-27 LAB
ANION GAP SERPL CALC-SCNC: 5 MMOL/L (ref 7–16)
BASOPHILS # BLD: 0 K/UL (ref 0–0.2)
BASOPHILS NFR BLD: 0 % (ref 0–2)
BUN SERPL-MCNC: 15 MG/DL (ref 6–23)
CALCIUM SERPL-MCNC: 9.4 MG/DL (ref 8.3–10.4)
CHLORIDE SERPL-SCNC: 98 MMOL/L (ref 98–107)
CO2 SERPL-SCNC: 30 MMOL/L (ref 21–32)
CREAT SERPL-MCNC: 0.8 MG/DL (ref 0.8–1.5)
DIFFERENTIAL METHOD BLD: ABNORMAL
EOSINOPHIL # BLD: 0 K/UL (ref 0–0.8)
EOSINOPHIL NFR BLD: 0 % (ref 0.5–7.8)
ERYTHROCYTE [DISTWIDTH] IN BLOOD BY AUTOMATED COUNT: 16.9 % (ref 11.9–14.6)
GLUCOSE SERPL-MCNC: 114 MG/DL (ref 65–100)
HCT VFR BLD AUTO: 34.8 %
HGB BLD-MCNC: 11.2 G/DL (ref 13.6–17.2)
IMM GRANULOCYTES # BLD AUTO: 0 K/UL (ref 0–0.5)
IMM GRANULOCYTES NFR BLD AUTO: 0 % (ref 0–5)
LYMPHOCYTES # BLD: 0.1 K/UL (ref 0.5–4.6)
LYMPHOCYTES NFR BLD: 1 % (ref 13–44)
MCH RBC QN AUTO: 26.2 PG (ref 26.1–32.9)
MCHC RBC AUTO-ENTMCNC: 32.2 G/DL (ref 31.4–35)
MCV RBC AUTO: 81.3 FL (ref 79.6–97.8)
MONOCYTES # BLD: 0 K/UL (ref 0.1–1.3)
MONOCYTES NFR BLD: 0 % (ref 4–12)
NEUTS SEG # BLD: 5.3 K/UL (ref 1.7–8.2)
NEUTS SEG NFR BLD: 99 % (ref 43–78)
NRBC # BLD: 0 K/UL (ref 0–0.2)
PLATELET # BLD AUTO: 207 K/UL (ref 150–450)
PLATELET COMMENTS,PCOM: ADEQUATE
PMV BLD AUTO: 9.6 FL (ref 9.4–12.3)
POTASSIUM SERPL-SCNC: 4.1 MMOL/L (ref 3.5–5.1)
RBC # BLD AUTO: 4.28 M/UL (ref 4.23–5.6)
RBC MORPH BLD: ABNORMAL
RBC MORPH BLD: ABNORMAL
SODIUM SERPL-SCNC: 133 MMOL/L (ref 136–145)
WBC # BLD AUTO: 5.4 K/UL (ref 4.3–11.1)
WBC MORPH BLD: ABNORMAL

## 2021-04-27 PROCEDURE — 85025 COMPLETE CBC W/AUTO DIFF WBC: CPT

## 2021-04-27 PROCEDURE — 74011000250 HC RX REV CODE- 250: Performed by: INTERNAL MEDICINE

## 2021-04-27 PROCEDURE — 96361 HYDRATE IV INFUSION ADD-ON: CPT

## 2021-04-27 PROCEDURE — 96374 THER/PROPH/DIAG INJ IV PUSH: CPT

## 2021-04-27 PROCEDURE — 80048 BASIC METABOLIC PNL TOTAL CA: CPT

## 2021-04-27 PROCEDURE — 74011250636 HC RX REV CODE- 250/636: Performed by: INTERNAL MEDICINE

## 2021-04-27 RX ORDER — POTASSIUM CHLORIDE AND SODIUM CHLORIDE 450; 150 MG/100ML; MG/100ML
INJECTION, SOLUTION INTRAVENOUS ONCE
Status: DISCONTINUED | OUTPATIENT
Start: 2021-04-27 | End: 2021-04-27 | Stop reason: SDUPTHER

## 2021-04-27 RX ORDER — SODIUM CHLORIDE 0.9 % (FLUSH) 0.9 %
10-40 SYRINGE (ML) INJECTION EVERY 8 HOURS
Status: DISCONTINUED | OUTPATIENT
Start: 2021-04-27 | End: 2021-04-29 | Stop reason: HOSPADM

## 2021-04-27 RX ORDER — ONDANSETRON 2 MG/ML
8 INJECTION INTRAMUSCULAR; INTRAVENOUS
Status: COMPLETED | OUTPATIENT
Start: 2021-04-27 | End: 2021-04-27

## 2021-04-27 RX ADMIN — ONDANSETRON 8 MG: 2 INJECTION INTRAMUSCULAR; INTRAVENOUS at 11:12

## 2021-04-27 RX ADMIN — POTASSIUM CHLORIDE: 149 INJECTION, SOLUTION, CONCENTRATE INTRAVENOUS at 09:09

## 2021-04-27 RX ADMIN — Medication 20 ML: at 01:15

## 2021-04-27 NOTE — PROGRESS NOTES
[address]  Transplant Date: 4/23/2021  Day: (+/-) +4. Chemo regimen used: Melphalan  Labs needed at next visit: see treatment plan. Labs not resulted that need follow-up: none. Next Appointment scheduled: 4/28 at 0830. BMT assessments and toxicities completed. WBC/ANC= 5.4/5.3  Prophylactic medications started 4/22 (per Dr. Chin Comer start on Day +6  Toxicities greater than 2 :none. Bactrim or equivalent to start on Day +30    Patient seen by Dr. Prince Ge orders received: fluids today  Issues: K=4.1, hypotension  Pt arrived via w/c.    Labs via apheresis cath. .  Pt medicated with Zofran for C/O nausea. Pt has no BLE edema today. Pt got  1LT. 45 NS +20K.  discharged via w/c with wife.  No distress noted.

## 2021-04-27 NOTE — PROGRESS NOTES
SW followed up with pt and wife in infusion. They noted transportation difficulties with [employer] on weekends. SW intends to follow up with [employer] per their request.    No other needs noted at this time. SW intends to follow up.

## 2021-04-28 ENCOUNTER — HOSPITAL ENCOUNTER (OUTPATIENT)
Dept: INFUSION THERAPY | Age: 56
Discharge: HOME | End: 2021-04-28
Payer: MEDICARE

## 2021-04-28 VITALS
RESPIRATION RATE: 16 BRPM | DIASTOLIC BLOOD PRESSURE: 74 MMHG | SYSTOLIC BLOOD PRESSURE: 127 MMHG | HEART RATE: 109 BPM | BODY MASS INDEX: 24.22 KG/M2 | OXYGEN SATURATION: 97 % | TEMPERATURE: 98.2 F | WEIGHT: 168.8 LBS

## 2021-04-28 DIAGNOSIS — C90.00 MULTIPLE MYELOMA NOT HAVING ACHIEVED REMISSION (HCC): Primary | ICD-10-CM

## 2021-04-28 LAB
ALBUMIN SERPL-MCNC: 3.5 G/DL (ref 3.5–5)
ALBUMIN/GLOB SERPL: 1.2 {RATIO} (ref 1.2–3.5)
ALP SERPL-CCNC: 78 U/L (ref 50–136)
ALT SERPL-CCNC: 27 U/L (ref 12–65)
ANION GAP SERPL CALC-SCNC: 7 MMOL/L (ref 7–16)
AST SERPL-CCNC: 10 U/L (ref 15–37)
BASOPHILS # BLD: 0 K/UL (ref 0–0.2)
BASOPHILS NFR BLD: 0 % (ref 0–2)
BILIRUB SERPL-MCNC: 0.5 MG/DL (ref 0.2–1.1)
BUN SERPL-MCNC: 17 MG/DL (ref 6–23)
CALCIUM SERPL-MCNC: 9.5 MG/DL (ref 8.3–10.4)
CHLORIDE SERPL-SCNC: 97 MMOL/L (ref 98–107)
CO2 SERPL-SCNC: 26 MMOL/L (ref 21–32)
CREAT SERPL-MCNC: 0.7 MG/DL (ref 0.8–1.5)
DIFFERENTIAL METHOD BLD: ABNORMAL
EOSINOPHIL # BLD: 0 K/UL (ref 0–0.8)
EOSINOPHIL NFR BLD: 0 % (ref 0.5–7.8)
ERYTHROCYTE [DISTWIDTH] IN BLOOD BY AUTOMATED COUNT: 16.2 % (ref 11.9–14.6)
GGT SERPL-CCNC: 75 U/L (ref 15–85)
GLOBULIN SER CALC-MCNC: 3 G/DL (ref 2.3–3.5)
GLUCOSE SERPL-MCNC: 130 MG/DL (ref 65–100)
HCT VFR BLD AUTO: 35.9 %
HGB BLD-MCNC: 11.6 G/DL (ref 13.6–17.2)
IMM GRANULOCYTES # BLD AUTO: 0.1 K/UL (ref 0–0.5)
IMM GRANULOCYTES NFR BLD AUTO: 2 % (ref 0–5)
LDH SERPL L TO P-CCNC: 210 U/L (ref 100–190)
LYMPHOCYTES # BLD: 0 K/UL (ref 0.5–4.6)
LYMPHOCYTES NFR BLD: 1 % (ref 13–44)
MAGNESIUM SERPL-MCNC: 1.9 MG/DL (ref 1.8–2.4)
MCH RBC QN AUTO: 25.9 PG (ref 26.1–32.9)
MCHC RBC AUTO-ENTMCNC: 32.3 G/DL (ref 31.4–35)
MCV RBC AUTO: 80.1 FL (ref 79.6–97.8)
MONOCYTES # BLD: 0 K/UL (ref 0.1–1.3)
MONOCYTES NFR BLD: 0 % (ref 4–12)
NEUTS SEG # BLD: 3.1 K/UL (ref 1.7–8.2)
NEUTS SEG NFR BLD: 97 % (ref 43–78)
NRBC # BLD: 0 K/UL (ref 0–0.2)
PHOSPHATE SERPL-MCNC: 4.6 MG/DL (ref 2.5–4.5)
PLATELET # BLD AUTO: 175 K/UL (ref 150–450)
PLATELET COMMENTS,PCOM: ADEQUATE
PMV BLD AUTO: 9.6 FL (ref 9.4–12.3)
POTASSIUM SERPL-SCNC: 4.4 MMOL/L (ref 3.5–5.1)
PROT SERPL-MCNC: 6.5 G/DL (ref 6.3–8.2)
RBC # BLD AUTO: 4.48 M/UL (ref 4.23–5.6)
RBC MORPH BLD: ABNORMAL
SODIUM SERPL-SCNC: 130 MMOL/L (ref 136–145)
WBC # BLD AUTO: 3.2 K/UL (ref 4.3–11.1)
WBC MORPH BLD: ABNORMAL

## 2021-04-28 PROCEDURE — 84100 ASSAY OF PHOSPHORUS: CPT

## 2021-04-28 PROCEDURE — 83615 LACTATE (LD) (LDH) ENZYME: CPT

## 2021-04-28 PROCEDURE — 74011000250 HC RX REV CODE- 250: Performed by: INTERNAL MEDICINE

## 2021-04-28 PROCEDURE — 80053 COMPREHEN METABOLIC PANEL: CPT

## 2021-04-28 PROCEDURE — 82977 ASSAY OF GGT: CPT

## 2021-04-28 PROCEDURE — 96361 HYDRATE IV INFUSION ADD-ON: CPT

## 2021-04-28 PROCEDURE — 96374 THER/PROPH/DIAG INJ IV PUSH: CPT

## 2021-04-28 PROCEDURE — 74011250636 HC RX REV CODE- 250/636: Performed by: INTERNAL MEDICINE

## 2021-04-28 PROCEDURE — 83735 ASSAY OF MAGNESIUM: CPT

## 2021-04-28 PROCEDURE — 85025 COMPLETE CBC W/AUTO DIFF WBC: CPT

## 2021-04-28 RX ORDER — POTASSIUM CHLORIDE AND SODIUM CHLORIDE 450; 150 MG/100ML; MG/100ML
INJECTION, SOLUTION INTRAVENOUS ONCE
Status: DISCONTINUED | OUTPATIENT
Start: 2021-04-28 | End: 2021-04-28 | Stop reason: CLARIF

## 2021-04-28 RX ORDER — ONDANSETRON 2 MG/ML
8 INJECTION INTRAMUSCULAR; INTRAVENOUS
Status: COMPLETED | OUTPATIENT
Start: 2021-04-28 | End: 2021-04-28

## 2021-04-28 RX ORDER — SODIUM CHLORIDE 0.9 % (FLUSH) 0.9 %
10-40 SYRINGE (ML) INJECTION EVERY 8 HOURS
Status: DISCONTINUED | OUTPATIENT
Start: 2021-04-28 | End: 2021-04-30 | Stop reason: HOSPADM

## 2021-04-28 RX ADMIN — ONDANSETRON 8 MG: 2 INJECTION INTRAMUSCULAR; INTRAVENOUS at 08:48

## 2021-04-28 RX ADMIN — Medication 20 ML: at 10:55

## 2021-04-28 RX ADMIN — POTASSIUM CHLORIDE: 149 INJECTION, SOLUTION, CONCENTRATE INTRAVENOUS at 08:51

## 2021-04-28 NOTE — PROGRESS NOTES
Diagnosis: Myeloma  Transplant Date: 4/23/2021  Day: (+/-) +5  Chemo regimen used: Melphalan  Labs needed at next visit: see treatment plan. Labs not resulted that need follow-up: none. Next Appointment scheduled: 4/29 at 0900. BMT assessments and toxicities completed. WBC/ANC= 3.2/3.1  Prophylactic medications started 4/22 (per Dr. Tracey Galvan start on Day +6  Toxicities greater than 2 :none. Bactrim or equivalent to start on Day +30    Patient seen by Dr. Shwetha De Souza orders received: fluids today  Issues: hypotension  Pt arrived via w/c.    [facility] apheresis cath. .  Pt medicated with Zofran for C/O nausea. Pt has no BLE edema today. Pt got  1LT. 45 NS +20K.  discharged via w/c with wife.  No distress noted.

## 2021-04-29 ENCOUNTER — HOSPITAL ENCOUNTER (OUTPATIENT)
Dept: INFUSION THERAPY | Age: 56
Discharge: HOME | End: 2021-04-29
Payer: MEDICARE

## 2021-04-29 VITALS
WEIGHT: 164.4 LBS | OXYGEN SATURATION: 95 % | DIASTOLIC BLOOD PRESSURE: 69 MMHG | SYSTOLIC BLOOD PRESSURE: 104 MMHG | HEART RATE: 112 BPM | TEMPERATURE: 98.7 F | RESPIRATION RATE: 16 BRPM | BODY MASS INDEX: 23.59 KG/M2

## 2021-04-29 DIAGNOSIS — C90.00 MULTIPLE MYELOMA NOT HAVING ACHIEVED REMISSION (HCC): Primary | ICD-10-CM

## 2021-04-29 LAB
ANION GAP SERPL CALC-SCNC: 7 MMOL/L (ref 7–16)
BUN SERPL-MCNC: 17 MG/DL (ref 6–23)
CALCIUM SERPL-MCNC: 8.1 MG/DL (ref 8.3–10.4)
CHLORIDE SERPL-SCNC: 100 MMOL/L (ref 98–107)
CO2 SERPL-SCNC: 26 MMOL/L (ref 21–32)
CREAT SERPL-MCNC: 0.7 MG/DL (ref 0.8–1.5)
DIFFERENTIAL METHOD BLD: ABNORMAL
ERYTHROCYTE [DISTWIDTH] IN BLOOD BY AUTOMATED COUNT: 16 % (ref 11.9–14.6)
GLUCOSE SERPL-MCNC: 95 MG/DL (ref 65–100)
HCT VFR BLD AUTO: 36.9 %
HGB BLD-MCNC: 11.8 G/DL (ref 13.6–17.2)
MCH RBC QN AUTO: 25.6 PG (ref 26.1–32.9)
MCHC RBC AUTO-ENTMCNC: 32 G/DL (ref 31.4–35)
MCV RBC AUTO: 80 FL (ref 79.6–97.8)
NRBC # BLD: 0 K/UL (ref 0–0.2)
PLATELET # BLD AUTO: 106 K/UL (ref 150–450)
PLATELET COMMENTS,PCOM: ABNORMAL
PMV BLD AUTO: 9 FL (ref 9.4–12.3)
POTASSIUM SERPL-SCNC: 3.8 MMOL/L (ref 3.5–5.1)
RBC # BLD AUTO: 4.61 M/UL (ref 4.23–5.6)
RBC MORPH BLD: ABNORMAL
SODIUM SERPL-SCNC: 133 MMOL/L (ref 136–145)
WBC # BLD AUTO: 0.2 K/UL (ref 4.3–11.1)
WBC MORPH BLD: ABNORMAL

## 2021-04-29 PROCEDURE — 74011000258 HC RX REV CODE- 258: Performed by: INTERNAL MEDICINE

## 2021-04-29 PROCEDURE — 96372 THER/PROPH/DIAG INJ SC/IM: CPT

## 2021-04-29 PROCEDURE — 74011250636 HC RX REV CODE- 250/636: Performed by: INTERNAL MEDICINE

## 2021-04-29 PROCEDURE — 96365 THER/PROPH/DIAG IV INF INIT: CPT

## 2021-04-29 PROCEDURE — 96361 HYDRATE IV INFUSION ADD-ON: CPT

## 2021-04-29 PROCEDURE — 85025 COMPLETE CBC W/AUTO DIFF WBC: CPT

## 2021-04-29 PROCEDURE — 80048 BASIC METABOLIC PNL TOTAL CA: CPT

## 2021-04-29 RX ORDER — ONDANSETRON 2 MG/ML
8 INJECTION INTRAMUSCULAR; INTRAVENOUS
Status: DISCONTINUED | OUTPATIENT
Start: 2021-04-29 | End: 2021-04-30 | Stop reason: HOSPADM

## 2021-04-29 RX ORDER — SODIUM CHLORIDE 0.9 % (FLUSH) 0.9 %
10-40 SYRINGE (ML) INJECTION AS NEEDED
Status: ACTIVE | OUTPATIENT
Start: 2021-04-29 | End: 2021-04-29

## 2021-04-29 RX ORDER — DEXTROSE, SODIUM CHLORIDE, AND POTASSIUM CHLORIDE 5; .45; .15 G/100ML; G/100ML; G/100ML
1000 INJECTION INTRAVENOUS
Status: COMPLETED | OUTPATIENT
Start: 2021-04-29 | End: 2021-04-29

## 2021-04-29 RX ADMIN — TBO-FILGRASTIM 300 MCG: 300 INJECTION, SOLUTION SUBCUTANEOUS at 11:00

## 2021-04-29 RX ADMIN — DEXTROSE, SODIUM CHLORIDE, AND POTASSIUM CHLORIDE 1000 ML: 5; .45; .15 INJECTION INTRAVENOUS at 09:19

## 2021-04-29 RX ADMIN — SODIUM CHLORIDE 1000 ML: 900 INJECTION, SOLUTION INTRAVENOUS at 09:07

## 2021-04-29 RX ADMIN — CALCIUM GLUCONATE 1 G: 98 INJECTION, SOLUTION INTRAVENOUS at 10:53

## 2021-04-29 RX ADMIN — Medication 10 ML: at 09:05

## 2021-04-29 RX ADMIN — Medication 20 ML: at 12:26

## 2021-04-29 NOTE — PROGRESS NOTES
Diagnosis: Myeloma  Transplant Date: 4/23/21  Day: (+/-) +6. Chemo regimen used: Melphalan  Labs needed at next visit: see orders. Labs not resulted that need follow-up: None. Next Appointment scheduled: 4/30. BMT assessments and toxicities completed. WBC/ANC= 0.2/too few to count. Prophylactic medications started Day +1. Granix started on Day +6 (4/29). Toxicities greater than 2 : None. Bactrim or equivalent to be initiated on Day +30. Patient seen by MD/NP Dr Ld Rao until engraftment. New orders received: None. Issues: Tachycardia  Patient arrived via w/c. Assessment completed, labs drawn & reviewed. Patient received 1 L D5 0.45% NS with 20 mEq KCl over 2 hours. 1 liter NS and Calcium gluconate 1 gram.  Discharged via wheelchair in stable condition.

## 2021-04-30 ENCOUNTER — HOSPITAL ENCOUNTER (OUTPATIENT)
Dept: INFUSION THERAPY | Age: 56
Discharge: HOME | End: 2021-04-30
Payer: MEDICARE

## 2021-04-30 VITALS
OXYGEN SATURATION: 93 % | BODY MASS INDEX: 23.42 KG/M2 | RESPIRATION RATE: 18 BRPM | WEIGHT: 163.2 LBS | DIASTOLIC BLOOD PRESSURE: 75 MMHG | HEART RATE: 113 BPM | TEMPERATURE: 98 F | SYSTOLIC BLOOD PRESSURE: 102 MMHG

## 2021-04-30 DIAGNOSIS — C90.00 MULTIPLE MYELOMA NOT HAVING ACHIEVED REMISSION (HCC): Primary | ICD-10-CM

## 2021-04-30 LAB
ALBUMIN SERPL-MCNC: 3 G/DL (ref 3.5–5)
ALBUMIN/GLOB SERPL: 1 {RATIO} (ref 1.2–3.5)
ALP SERPL-CCNC: 77 U/L (ref 50–136)
ALT SERPL-CCNC: 25 U/L (ref 12–65)
ANION GAP SERPL CALC-SCNC: 7 MMOL/L (ref 7–16)
AST SERPL-CCNC: 15 U/L (ref 15–37)
BILIRUB SERPL-MCNC: 0.4 MG/DL (ref 0.2–1.1)
BUN SERPL-MCNC: 12 MG/DL (ref 6–23)
CALCIUM SERPL-MCNC: 9.1 MG/DL (ref 8.3–10.4)
CHLORIDE SERPL-SCNC: 100 MMOL/L (ref 98–107)
CO2 SERPL-SCNC: 24 MMOL/L (ref 21–32)
CREAT SERPL-MCNC: 0.7 MG/DL (ref 0.8–1.5)
DIFFERENTIAL METHOD BLD: ABNORMAL
ERYTHROCYTE [DISTWIDTH] IN BLOOD BY AUTOMATED COUNT: 15.9 % (ref 11.9–14.6)
GGT SERPL-CCNC: 69 U/L (ref 15–85)
GLOBULIN SER CALC-MCNC: 3 G/DL (ref 2.3–3.5)
GLUCOSE SERPL-MCNC: 132 MG/DL (ref 65–100)
HCT VFR BLD AUTO: 37.8 %
HGB BLD-MCNC: 12.2 G/DL (ref 13.6–17.2)
LDH SERPL L TO P-CCNC: 171 U/L (ref 100–190)
MAGNESIUM SERPL-MCNC: 1.9 MG/DL (ref 1.8–2.4)
MCH RBC QN AUTO: 25.6 PG (ref 26.1–32.9)
MCHC RBC AUTO-ENTMCNC: 32.3 G/DL (ref 31.4–35)
MCV RBC AUTO: 79.4 FL (ref 79.6–97.8)
NRBC # BLD: 0 K/UL (ref 0–0.2)
PHOSPHATE SERPL-MCNC: 3.5 MG/DL (ref 2.5–4.5)
PLATELET # BLD AUTO: 71 K/UL (ref 150–450)
PLATELET COMMENTS,PCOM: ABNORMAL
PMV BLD AUTO: 10 FL (ref 9.4–12.3)
POTASSIUM SERPL-SCNC: 4 MMOL/L (ref 3.5–5.1)
PROT SERPL-MCNC: 6 G/DL (ref 6.3–8.2)
RBC # BLD AUTO: 4.76 M/UL (ref 4.23–5.6)
RBC MORPH BLD: ABNORMAL
SODIUM SERPL-SCNC: 131 MMOL/L (ref 136–145)
WBC # BLD AUTO: 0.1 K/UL (ref 4.3–11.1)
WBC MORPH BLD: ABNORMAL

## 2021-04-30 PROCEDURE — 82977 ASSAY OF GGT: CPT

## 2021-04-30 PROCEDURE — 83615 LACTATE (LD) (LDH) ENZYME: CPT

## 2021-04-30 PROCEDURE — 85025 COMPLETE CBC W/AUTO DIFF WBC: CPT

## 2021-04-30 PROCEDURE — 96374 THER/PROPH/DIAG INJ IV PUSH: CPT

## 2021-04-30 PROCEDURE — 80053 COMPREHEN METABOLIC PANEL: CPT

## 2021-04-30 PROCEDURE — 83735 ASSAY OF MAGNESIUM: CPT

## 2021-04-30 PROCEDURE — 74011250636 HC RX REV CODE- 250/636: Performed by: INTERNAL MEDICINE

## 2021-04-30 PROCEDURE — 84100 ASSAY OF PHOSPHORUS: CPT

## 2021-04-30 PROCEDURE — 96372 THER/PROPH/DIAG INJ SC/IM: CPT

## 2021-04-30 RX ORDER — DEXTROSE, SODIUM CHLORIDE, AND POTASSIUM CHLORIDE 5; .45; .15 G/100ML; G/100ML; G/100ML
1000 INJECTION INTRAVENOUS
Status: COMPLETED | OUTPATIENT
Start: 2021-04-30 | End: 2021-04-30

## 2021-04-30 RX ORDER — SODIUM CHLORIDE 0.9 % (FLUSH) 0.9 %
10-40 SYRINGE (ML) INJECTION AS NEEDED
Status: ACTIVE | OUTPATIENT
Start: 2021-04-30 | End: 2021-04-30

## 2021-04-30 RX ORDER — ONDANSETRON 2 MG/ML
8 INJECTION INTRAMUSCULAR; INTRAVENOUS
Status: COMPLETED | OUTPATIENT
Start: 2021-04-30 | End: 2021-04-30

## 2021-04-30 RX ADMIN — Medication 10 ML: at 09:34

## 2021-04-30 RX ADMIN — DEXTROSE, SODIUM CHLORIDE, AND POTASSIUM CHLORIDE 1000 ML: 5; .45; .15 INJECTION INTRAVENOUS at 09:54

## 2021-04-30 RX ADMIN — Medication 20 ML: at 11:56

## 2021-04-30 RX ADMIN — SODIUM CHLORIDE 1000 ML: 900 INJECTION, SOLUTION INTRAVENOUS at 09:34

## 2021-04-30 RX ADMIN — ONDANSETRON 8 MG: 2 INJECTION INTRAMUSCULAR; INTRAVENOUS at 11:15

## 2021-04-30 RX ADMIN — TBO-FILGRASTIM 300 MCG: 300 INJECTION, SOLUTION SUBCUTANEOUS at 10:58

## 2021-04-30 NOTE — PROGRESS NOTES
Diagnosis: Multiple Myeloma  Transplant Date: 4/23/21  Day: (+/-) +7. Chemo regimen used: Melphalan  Labs needed at next visit: see orders. Labs not resulted that need follow-up: None. Next Appointment scheduled: 5/1/21. BMT assessments and toxicities completed. WBC/ANC= 0.1/too few to count. Prophylactic medications started Day +1  Granix started on Day +6 (4/29)  Toxicities greater than 2 : None. Bactrim or equivalent to be initiated on Day +30. Patient seen by MD/NP Dr Nelida Reid until engraftment. New orders received: None. Issues: Tachycardia  Patient arrived via w/c. Assessment completed, labs drawn & reviewed. Patient received 1 liter NS and 1 liter D5 0.45%NS with 20 mEq KCl over 2 hours. Granix given. Discharged via wheelchair in stable condition.

## 2021-05-01 ENCOUNTER — HOSPITAL ENCOUNTER (OUTPATIENT)
Dept: INFUSION THERAPY | Age: 56
Discharge: HOME | End: 2021-05-01
Payer: MEDICARE

## 2021-05-01 VITALS
OXYGEN SATURATION: 98 % | BODY MASS INDEX: 23.62 KG/M2 | SYSTOLIC BLOOD PRESSURE: 123 MMHG | DIASTOLIC BLOOD PRESSURE: 74 MMHG | RESPIRATION RATE: 18 BRPM | TEMPERATURE: 98.2 F | WEIGHT: 164.6 LBS | HEART RATE: 126 BPM

## 2021-05-01 DIAGNOSIS — C90.00 MULTIPLE MYELOMA NOT HAVING ACHIEVED REMISSION (HCC): Primary | ICD-10-CM

## 2021-05-01 LAB
ALBUMIN SERPL-MCNC: 2.8 G/DL (ref 3.5–5)
ALBUMIN/GLOB SERPL: 1 {RATIO} (ref 1.2–3.5)
ALP SERPL-CCNC: 70 U/L (ref 50–136)
ALT SERPL-CCNC: 22 U/L (ref 12–65)
ANION GAP SERPL CALC-SCNC: 7 MMOL/L (ref 7–16)
AST SERPL-CCNC: 12 U/L (ref 15–37)
BILIRUB SERPL-MCNC: 0.4 MG/DL (ref 0.2–1.1)
BUN SERPL-MCNC: 11 MG/DL (ref 6–23)
CALCIUM SERPL-MCNC: 8.5 MG/DL (ref 8.3–10.4)
CHLORIDE SERPL-SCNC: 101 MMOL/L (ref 98–107)
CO2 SERPL-SCNC: 24 MMOL/L (ref 21–32)
CREAT SERPL-MCNC: 0.7 MG/DL (ref 0.8–1.5)
DIFFERENTIAL METHOD BLD: ABNORMAL
ERYTHROCYTE [DISTWIDTH] IN BLOOD BY AUTOMATED COUNT: 15.8 % (ref 11.9–14.6)
GLOBULIN SER CALC-MCNC: 2.9 G/DL (ref 2.3–3.5)
GLUCOSE SERPL-MCNC: 119 MG/DL (ref 65–100)
HCT VFR BLD AUTO: 36.1 %
HGB BLD-MCNC: 11.8 G/DL (ref 13.6–17.2)
MCH RBC QN AUTO: 25.9 PG (ref 26.1–32.9)
MCHC RBC AUTO-ENTMCNC: 32.7 G/DL (ref 31.4–35)
MCV RBC AUTO: 79.2 FL (ref 79.6–97.8)
NRBC # BLD: 0 K/UL (ref 0–0.2)
PLATELET # BLD AUTO: 35 K/UL (ref 150–450)
PLATELET COMMENTS,PCOM: ABNORMAL
PMV BLD AUTO: ABNORMAL FL (ref 9.4–12.3)
POTASSIUM SERPL-SCNC: 4 MMOL/L (ref 3.5–5.1)
PROT SERPL-MCNC: 5.7 G/DL (ref 6.3–8.2)
RBC # BLD AUTO: 4.56 M/UL (ref 4.23–5.6)
RBC MORPH BLD: ABNORMAL
SODIUM SERPL-SCNC: 132 MMOL/L (ref 136–145)
WBC # BLD AUTO: 0.1 K/UL (ref 4.3–11.1)
WBC MORPH BLD: ABNORMAL

## 2021-05-01 PROCEDURE — 96360 HYDRATION IV INFUSION INIT: CPT

## 2021-05-01 PROCEDURE — 80053 COMPREHEN METABOLIC PANEL: CPT

## 2021-05-01 PROCEDURE — 96361 HYDRATE IV INFUSION ADD-ON: CPT

## 2021-05-01 PROCEDURE — 99214 OFFICE O/P EST MOD 30 MIN: CPT | Performed by: INTERNAL MEDICINE

## 2021-05-01 PROCEDURE — 96372 THER/PROPH/DIAG INJ SC/IM: CPT

## 2021-05-01 PROCEDURE — 74011250636 HC RX REV CODE- 250/636: Performed by: INTERNAL MEDICINE

## 2021-05-01 PROCEDURE — 85025 COMPLETE CBC W/AUTO DIFF WBC: CPT

## 2021-05-01 RX ORDER — DEXTROSE, SODIUM CHLORIDE, AND POTASSIUM CHLORIDE 5; .45; .15 G/100ML; G/100ML; G/100ML
1000 INJECTION INTRAVENOUS
Status: COMPLETED | OUTPATIENT
Start: 2021-05-01 | End: 2021-05-01

## 2021-05-01 RX ORDER — SODIUM CHLORIDE 0.9 % (FLUSH) 0.9 %
10-40 SYRINGE (ML) INJECTION AS NEEDED
Status: ACTIVE | OUTPATIENT
Start: 2021-05-01 | End: 2021-05-01

## 2021-05-01 RX ADMIN — Medication 20 ML: at 10:00

## 2021-05-01 RX ADMIN — SODIUM CHLORIDE 1000 ML: 900 INJECTION, SOLUTION INTRAVENOUS at 07:49

## 2021-05-01 RX ADMIN — DEXTROSE, SODIUM CHLORIDE, AND POTASSIUM CHLORIDE 1000 ML: 5; .45; .15 INJECTION INTRAVENOUS at 07:59

## 2021-05-01 RX ADMIN — TBO-FILGRASTIM 300 MCG: 300 INJECTION, SOLUTION SUBCUTANEOUS at 10:04

## 2021-05-01 RX ADMIN — Medication 10 ML: at 07:49

## 2021-05-01 NOTE — PROGRESS NOTES
Diagnosis: Multiple Myeloma  Transplant Date: 4/23/21  Day: (+/-) +8. Chemo regimen used: Melphalan  Labs needed at next visit: see orders. Labs not resulted that need follow-up: None. Next Appointment scheduled: 5/2. BMT assessments and toxicities completed. WBC/ANC= 0.1/too few to count. Prophylactic medications started Day +1. Granix started on Day +6 (4/29)   Toxicities greater than 2 :None. Bactrim or equivalent to be initiated on Day +30. Patient seen by MD/NP Dr Holden Stout until engraftment. New orders received: None. Issues: Tachycardia  Patient arrived via w/c. Assessment completed, labs drawn & reviewed. Patient received 1 liter NS and 1 liter D5 0.45%NS with 20 mEq over 2 hours. Granix given. Seen by Dr Holden Stout. Discharged via wheelchair in stable condition.

## 2021-05-01 NOTE — PROGRESS NOTES
[facility] Hematology and Oncology: Office Visit Established Patient    Chief Complaint:    No chief complaint on file. History of Present Illness:  Mr. Nabil Palacio is a 48 y.o. male who returns today for management of multiple myeloma. In 9/2020 he was diagnosed with Multiple Myeloma, Kappa light chain, del.13q, t( 11;14 ), ISS Stage 3; at diagnosis his Kappa level was 5824, he received 4 cycles of RVd and achieved a VGPR, his Kappa level decreased to 285. He underwent an Autologous PBHCT on 4/23/2021. Here for daily assessment in infusion, day +8 today. He is very fatigued, ECOG 1 currently. Nausea with poor appetite but trying to maintain his PO intake. Some loose stools but not queta diarrhea. No fevers.         Review of Systems:  10 point review of systems is otherwise negative with the exception of the elements mentioned above in the HPI. No Known Allergies  Past Medical History:   Diagnosis Date    Cancer ([facility] [address] dx 1/2021    multiple myeloma----    GERD (gastroesophageal reflux disease)     controlled with med    Headache     CLUSTER PER G' FRIEND     Past Surgical History:   Procedure Laterality Date    HX KNEE REPLACEMENT Left 06/2000    HX KYPHOPLASTY  08/2020    HX OTHER SURGICAL      bone marrow biopsy    HX TRANSPLANT      Autologous Stem cell transplant    IR INSERT TUNL CVC W/O PORT OVER 5 YR  3/31/2021    IR US GUIDE NDL PLACE  3/29/2021     History reviewed. No pertinent family history.   Social History     Socioeconomic History    Marital status: SINGLE     Spouse name: Not on file    Number of children: Not on file    Years of education: Not on file    Highest education level: Not on file   Occupational History    Not on file   Social Needs    Financial resource strain: Not on file    Food insecurity     Worry: Not on file     Inability: Not on file    Transportation needs     Medical: Not on file     Non-medical: Not on file   Tobacco Use    Smoking status: Former Smoker     Packs/day: 1.00     Years: 4.00     Pack years: 4.00     Quit date:      Years since quittin.3    Smokeless tobacco: Never Used   Substance and Sexual Activity    Alcohol use: Not Currently     Frequency: Never     Binge frequency: Never    Drug use: Not Currently    Sexual activity: Not on file   Lifestyle    Physical activity     Days per week: Not on file     Minutes per session: Not on file    Stress: Not on file   Relationships    Social connections     Talks on phone: Not on file     Gets together: Not on file     Attends Sabianism service: Not on file     Active member of club or organization: Not on file     Attends meetings of clubs or organizations: Not on file     Relationship status: Not on file    Intimate partner violence     Fear of current or ex partner: Not on file     Emotionally abused: Not on file     Physically abused: Not on file     Forced sexual activity: Not on file   Other Topics Concern     Service Not Asked    Blood Transfusions Not Asked    Caffeine Concern Not Asked    Occupational Exposure Not Asked   [employer] Hazards Not Asked    Sleep Concern Not Asked    Stress Concern Not Asked    Weight Concern Not Asked    Special Diet Not Asked    Back Care Not Asked    Exercise Not Asked    Bike Helmet Not Asked    Seat Belt Not Asked    Self-Exams Not Asked   Social History Narrative    Not on file     Current Outpatient Medications   Medication Sig Dispense Refill    prochlorperazine (Compazine) 10 mg tablet Take 1 Tab by mouth every six (6) hours as needed for Nausea or Vomiting. 90 Tab 2    ondansetron hcl (ZOFRAN) 8 mg tablet Take 1 Tab by mouth every eight (8) hours as needed for Nausea or Vomiting. 90 Tab 1    acyclovir (ZOVIRAX) 400 mg tablet Take 1 Tab by mouth two (2) times a day. 60 Tab 5    amoxicillin-clavulanate (Augmentin) 875-125 mg per tablet Take 1 Tab by mouth two (2) times a day for 14 days.  28 Tab 0    fluconazole (Diflucan) 200 mg tablet Take 1 Tab by mouth daily for 14 days. FDA advises cautious prescribing of oral fluconazole in pregnancy. 14 Tab 0    levoFLOXacin (Levaquin) 500 mg tablet Take 1 Tab by mouth daily for 14 days. 14 Tab 0    gabapentin (NEURONTIN) 300 mg capsule Take 1 Cap by mouth three (3) times daily for 30 days. 90 Cap 3    cyclobenzaprine (FLEXERIL) 10 mg tablet Take 1 Tab by mouth as needed for Muscle Spasm(s). 30 Tab 1    zolpidem (AMBIEN) 5 mg tablet Take 1 Tab by mouth nightly as needed for Sleep. Max Daily Amount: 5 mg. 30 Tab 2    morphine CR (MS CONTIN) 60 mg CR tablet Take 1 Tab by mouth every twelve (12) hours for 30 days. Max Daily Amount: 120 mg. 60 Tab 0    oxyCODONE-acetaminophen (PERCOCET 10)  mg per tablet Take 1 Tab by mouth every four (4) hours as needed for Pain for up to 30 days. Max Daily Amount: 6 Tabs. 180 Tab 0    vitamin k 100 mcg tablet Take 100 mcg by mouth daily.  cyanocobalamin (Vitamin B-12) 100 mcg tablet Take 100 mcg by mouth daily.  melatonin 3 mg tablet TAKE 1 TABLET BY MOUTH EVERY NIGHT AS NEEDED FOR SLEEP      potassium chloride (K-DUR, KLOR-CON) 20 mEq tablet Take 1 Tab by mouth two (2) times a day. (Patient taking differently: Take 20 mEq by mouth daily.) 60 Tab 0    cholecalciferol (VITAMIN D3) (1000 Units /25 mcg) tablet Take 1,000 Units by mouth daily.  LORazepam (ATIVAN) 0.5 mg tablet Take 0.5 mg by mouth daily.  montelukast (SINGULAIR) 10 mg tablet nightly.  pantoprazole (PROTONIX) 40 mg tablet daily.  aspirin (ASPIRIN) 325 mg tablet Take 1 Tab by mouth daily.  Per patient instructed to hold for 7 days--pt stopped 3/20/21       Current Facility-Administered Medications   Medication Dose Route Frequency Provider Last Rate Last Admin    sodium chloride (NS) flush 10-40 mL  10-40 mL IntraVENous PRN Torie Ma MD   20 mL at 05/01/21 1000    sodium chloride 0.9 % bolus infusion 1,000 mL  1,000 mL IntraVENous Mariola Brown MD   Stopped at 05/01/21 1000     Facility-Administered Medications Ordered in Other Encounters   Medication Dose Route Frequency Provider Last Rate Last Admin    sodium chloride 0.9 % bolus infusion 1,000 mL  1,000 mL IntraVENous CONTINUOUS Shanita Will MD   Stopped at 04/30/21 1140       OBJECTIVE:  Visit Vitals  /74 (BP 1 Location: Right upper arm, BP Patient Position: Sitting)   Pulse (!) 126   Temp 98.2 °F (36.8 °C)   Resp 18   Wt 164 lb 9.6 oz (74.7 kg)   SpO2 98%   BMI 23.62 kg/m²       Physical Exam:  Constitutional: Well developed, well nourished male in no acute distress, sitting comfortably on the examination table. HEENT: Normocephalic and atraumatic. Oropharynx is clear, mucous membranes are moist.  Sclerae anicteric. Neck supple without JVD. No thyromegaly present. Skin Warm and dry. No bruising and no rash noted. No erythema. No pallor. Respiratory Lungs are clear to auscultation bilaterally without wheezes, rales or rhonchi, normal air exchange without accessory muscle use. CVS Normal rate, regular rhythm and normal S1 and S2. No murmurs, gallops, or rubs. Abdomen Soft, nontender and nondistended, normoactive bowel sounds. No palpable mass. No hepatosplenomegaly. Neuro Grossly nonfocal with no obvious sensory or motor deficits. MSK Normal range of motion in general.  No edema and no tenderness. Psych Appropriate mood and affect. Labs:  Recent Results (from the past 24 hour(s))   CBC WITH AUTOMATED DIFF    Collection Time: 05/01/21  7:47 AM   Result Value Ref Range    WBC 0.1 (LL) 4.3 - 11.1 K/uL    RBC 4.56 4.23 - 5.6 M/uL    HGB 11.8 (L) 13.6 - 17.2 g/dL    HCT 36.1 %    MCV 79.2 (L) 79.6 - 97.8 FL    MCH 25.9 (L) 26.1 - 32.9 PG    MCHC 32.7 31.4 - 35.0 g/dL    RDW 15.8 (H) 11.9 - 14.6 %    PLATELET 35 (L) 002 - 450 K/uL    MPV Unable to calculate. Recommend adding IPF.  9.4 - 12.3 FL    ABSOLUTE NRBC 0.00 0.0 - 0.2 K/uL    RBC COMMENTS SLIGHT  ANISOCYTOSIS + POIKILOCYTOSIS        RBC COMMENTS OCCASIONAL  OVALOCYTES        RBC COMMENTS OCCASIONAL  ACANTHOCYTES        WBC COMMENTS Result Confirmed By Smear      PLATELET COMMENTS MARKED      DF MANUAL     METABOLIC PANEL, COMPREHENSIVE    Collection Time: 05/01/21  7:47 AM   Result Value Ref Range    Sodium 132 (L) 136 - 145 mmol/L    Potassium 4.0 3.5 - 5.1 mmol/L    Chloride 101 98 - 107 mmol/L    CO2 24 21 - 32 mmol/L    Anion gap 7 7 - 16 mmol/L    Glucose 119 (H) 65 - 100 mg/dL    BUN 11 6 - 23 MG/DL    Creatinine 0.70 (L) 0.8 - 1.5 MG/DL    GFR est AA >60 >60 ml/min/1.73m2    GFR est non-AA >60 >60 ml/min/1.73m2    Calcium 8.5 8.3 - 10.4 MG/DL    Bilirubin, total 0.4 0.2 - 1.1 MG/DL    ALT (SGPT) 22 12 - 65 U/L    AST (SGOT) 12 (L) 15 - 37 U/L    Alk. phosphatase 70 50 - 136 U/L    Protein, total 5.7 (L) 6.3 - 8.2 g/dL    Albumin 2.8 (L) 3.5 - 5.0 g/dL    Globulin 2.9 2.3 - 3.5 g/dL    A-G Ratio 1.0 (L) 1.2 - 3.5          Imaging:  No procedure found. ASSESSMENT:    ICD-10-CM ICD-9-CM    1. Multiple myeloma not having achieved remission (HCC)  C90.00 203.00 VERIFY LABS      TREATMENT CONDITIONS      REGIMEN NOTES/COMMUNICATIONS      dextrose 5% - 0.45% NaCl with KCl 20 mEq/L infusion 1,000 mL      tbo-filgrastim (GRANIX) injection 300 mcg      VERIFY LABS      TREATMENT CONDITIONS      REGIMEN NOTES/COMMUNICATIONS           PLAN:  Lab studies were personally reviewed. MM: diagnosed 2020, completed 4 cycles of RVd with WI, now autoSCT with D0 on 4/23/21. Here for daily assessment in infusion, day +8 today. He is very fatigued, ECOG 1 currently. Nausea with poor appetite but trying to maintain his PO intake. Some loose stools but not queta diarrhea. No fevers. Labs reviewed, WBC remains functionally zero, platelets are dropping but not to transfusion threshold yet. Continue supportive care, encouraged good PO intake. Continue daily infusion visits.   All questions were asked and answered to the best of my ability.            Hernandez Joy MD  [facility] Hematology and Oncology  [address] [place] [address]  Office : [phone]  Fax : [phone]

## 2021-05-02 ENCOUNTER — HOSPITAL ENCOUNTER (OUTPATIENT)
Dept: INFUSION THERAPY | Age: 56
Discharge: HOME | End: 2021-05-02
Payer: MEDICARE

## 2021-05-02 VITALS
TEMPERATURE: 98.5 F | OXYGEN SATURATION: 95 % | SYSTOLIC BLOOD PRESSURE: 101 MMHG | WEIGHT: 163 LBS | HEART RATE: 109 BPM | RESPIRATION RATE: 18 BRPM | BODY MASS INDEX: 23.39 KG/M2 | DIASTOLIC BLOOD PRESSURE: 62 MMHG

## 2021-05-02 DIAGNOSIS — C90.00 MULTIPLE MYELOMA NOT HAVING ACHIEVED REMISSION (HCC): Primary | ICD-10-CM

## 2021-05-02 LAB
ANION GAP SERPL CALC-SCNC: 8 MMOL/L (ref 7–16)
BUN SERPL-MCNC: 9 MG/DL (ref 6–23)
CALCIUM SERPL-MCNC: 8.6 MG/DL (ref 8.3–10.4)
CHLORIDE SERPL-SCNC: 99 MMOL/L (ref 98–107)
CO2 SERPL-SCNC: 26 MMOL/L (ref 21–32)
CREAT SERPL-MCNC: 0.5 MG/DL (ref 0.8–1.5)
GLUCOSE SERPL-MCNC: 118 MG/DL (ref 65–100)
POTASSIUM SERPL-SCNC: 4 MMOL/L (ref 3.5–5.1)
SODIUM SERPL-SCNC: 133 MMOL/L (ref 136–145)

## 2021-05-02 PROCEDURE — 99214 OFFICE O/P EST MOD 30 MIN: CPT | Performed by: INTERNAL MEDICINE

## 2021-05-02 PROCEDURE — 80048 BASIC METABOLIC PNL TOTAL CA: CPT

## 2021-05-02 PROCEDURE — 74011250636 HC RX REV CODE- 250/636: Performed by: INTERNAL MEDICINE

## 2021-05-02 PROCEDURE — 96372 THER/PROPH/DIAG INJ SC/IM: CPT

## 2021-05-02 PROCEDURE — 86644 CMV ANTIBODY: CPT

## 2021-05-02 PROCEDURE — 85025 COMPLETE CBC W/AUTO DIFF WBC: CPT

## 2021-05-02 PROCEDURE — 96361 HYDRATE IV INFUSION ADD-ON: CPT

## 2021-05-02 PROCEDURE — 96360 HYDRATION IV INFUSION INIT: CPT

## 2021-05-02 RX ORDER — SODIUM CHLORIDE 9 MG/ML
250 INJECTION, SOLUTION INTRAVENOUS AS NEEDED
Status: CANCELLED | OUTPATIENT
Start: 2021-05-02

## 2021-05-02 RX ORDER — SODIUM CHLORIDE 0.9 % (FLUSH) 0.9 %
10 SYRINGE (ML) INJECTION AS NEEDED
Status: CANCELLED | OUTPATIENT
Start: 2021-05-02

## 2021-05-02 RX ORDER — SODIUM CHLORIDE 0.9 % (FLUSH) 0.9 %
10-40 SYRINGE (ML) INJECTION AS NEEDED
Status: ACTIVE | OUTPATIENT
Start: 2021-05-02 | End: 2021-05-02

## 2021-05-02 RX ORDER — DEXTROSE, SODIUM CHLORIDE, AND POTASSIUM CHLORIDE 5; .45; .15 G/100ML; G/100ML; G/100ML
1000 INJECTION INTRAVENOUS
Status: COMPLETED | OUTPATIENT
Start: 2021-05-02 | End: 2021-05-02

## 2021-05-02 RX ORDER — ACETAMINOPHEN 325 MG/1
650 TABLET ORAL ONCE
Status: CANCELLED | OUTPATIENT
Start: 2021-05-02 | End: 2021-05-02

## 2021-05-02 RX ORDER — DIPHENHYDRAMINE HCL 25 MG
25 CAPSULE ORAL ONCE
Status: CANCELLED | OUTPATIENT
Start: 2021-05-02 | End: 2021-05-02

## 2021-05-02 RX ADMIN — Medication 9.5 ML: at 10:50

## 2021-05-02 RX ADMIN — SODIUM CHLORIDE 1000 ML: 900 INJECTION, SOLUTION INTRAVENOUS at 08:45

## 2021-05-02 RX ADMIN — Medication 9.5 ML: at 08:21

## 2021-05-02 RX ADMIN — DEXTROSE, SODIUM CHLORIDE, AND POTASSIUM CHLORIDE 1000 ML: 5; .45; .15 INJECTION INTRAVENOUS at 08:45

## 2021-05-02 RX ADMIN — TBO-FILGRASTIM 300 MCG: 300 INJECTION, SOLUTION SUBCUTANEOUS at 10:55

## 2021-05-02 RX ADMIN — Medication 9.5 ML: at 08:20

## 2021-05-02 NOTE — PROGRESS NOTES
[facility]: Office Visit Established Patient    Chief Complaint:    No chief complaint on file. History of Present Illness:  Mr. Norma Campbell is a 48 y.o. male who returns today for management of multiple myeloma. In 9/2020 he was diagnosed with Multiple Myeloma, Kappa light chain, del.13q, t( 11;14 ), ISS Stage 3; at diagnosis his Kappa level was 5824, he received 4 cycles of RVd and achieved a VGPR, his Kappa level decreased to 285. He underwent an Autologous PBHCT on 4/23/2021. Here for daily assessment in infusion, day +9 today. He remains very fatigued, but ECOG 1, able to do all ADLs. Nausea with poor appetite but trying to maintain his PO intake. Some loose stools but no queta diarrhea. No fevers or infectious symptoms. No pain except when having a BM (stomach cramps).         Review of Systems:  10 point review of systems is otherwise negative with the exception of the elements mentioned above in the HPI. No Known Allergies  Past Medical History:   Diagnosis Date    Cancer ([facility] [address] dx 1/2021    multiple myeloma----    GERD (gastroesophageal reflux disease)     controlled with med    Headache     CLUSTER PER G' FRIEND     Past Surgical History:   Procedure Laterality Date    HX KNEE REPLACEMENT Left 06/2000    HX KYPHOPLASTY  08/2020    HX OTHER SURGICAL      bone marrow biopsy    HX TRANSPLANT      Autologous Stem cell transplant    IR INSERT TUNL CVC W/O PORT OVER 5 YR  3/31/2021    IR US GUIDE NDL PLACE  3/29/2021     History reviewed. No pertinent family history.   Social History     Socioeconomic History    Marital status: SINGLE     Spouse name: Not on file    Number of children: Not on file    Years of education: Not on file    Highest education level: Not on file   Occupational History    Not on file   Social Needs    Financial resource strain: Not on file    Food insecurity     Worry: Not on file     Inability: Not on file   [employer] needs Medical: Not on file     Non-medical: Not on file   Tobacco Use    Smoking status: Former Smoker     Packs/day: 1.00     Years: 4.00     Pack years: 4.00     Quit date:      Years since quittin.3    Smokeless tobacco: Never Used   Substance and Sexual Activity    Alcohol use: Not Currently     Frequency: Never     Binge frequency: Never    Drug use: Not Currently    Sexual activity: Not on file   Lifestyle    Physical activity     Days per week: Not on file     Minutes per session: Not on file    Stress: Not on file   Relationships    Social connections     Talks on phone: Not on file     Gets together: Not on file     Attends [employer] service: Not on file     Active member of club or organization: Not on file     Attends meetings of clubs or organizations: Not on file     Relationship status: Not on file    Intimate partner violence     Fear of current or ex partner: Not on file     Emotionally abused: Not on file     Physically abused: Not on file     Forced sexual activity: Not on file   Other Topics Concern     Service Not Asked    Blood Transfusions Not Asked    Caffeine Concern Not Asked    Occupational Exposure Not Asked   Sidney Vill[place] Hazards Not Asked    Sleep Concern Not Asked    Stress Concern Not Asked    Weight Concern Not Asked    Special Diet Not Asked    Back Care Not Asked    Exercise Not Asked    Bike Helmet Not Asked    [address] Not Asked    Self-Exams Not Asked   Social History Narrative    Not on file     Current Outpatient Medications   Medication Sig Dispense Refill    prochlorperazine (Compazine) 10 mg tablet Take 1 Tab by mouth every six (6) hours as needed for Nausea or Vomiting. 90 Tab 2    ondansetron hcl (ZOFRAN) 8 mg tablet Take 1 Tab by mouth every eight (8) hours as needed for Nausea or Vomiting. 90 Tab 1    acyclovir (ZOVIRAX) 400 mg tablet Take 1 Tab by mouth two (2) times a day.  60 Tab 5    amoxicillin-clavulanate (Augmentin) 875-125 mg per tablet Take 1 Tab by mouth two (2) times a day for 14 days. 28 Tab 0    fluconazole (Diflucan) 200 mg tablet Take 1 Tab by mouth daily for 14 days. FDA advises cautious prescribing of oral fluconazole in pregnancy. 14 Tab 0    levoFLOXacin (Levaquin) 500 mg tablet Take 1 Tab by mouth daily for 14 days. 14 Tab 0    gabapentin (NEURONTIN) 300 mg capsule Take 1 Cap by mouth three (3) times daily for 30 days. 90 Cap 3    cyclobenzaprine (FLEXERIL) 10 mg tablet Take 1 Tab by mouth as needed for Muscle Spasm(s). 30 Tab 1    zolpidem (AMBIEN) 5 mg tablet Take 1 Tab by mouth nightly as needed for Sleep. Max Daily Amount: 5 mg. 30 Tab 2    morphine CR (MS CONTIN) 60 mg CR tablet Take 1 Tab by mouth every twelve (12) hours for 30 days. Max Daily Amount: 120 mg. 60 Tab 0    oxyCODONE-acetaminophen (PERCOCET 10)  mg per tablet Take 1 Tab by mouth every four (4) hours as needed for Pain for up to 30 days. Max Daily Amount: 6 Tabs. 180 Tab 0    vitamin k 100 mcg tablet Take 100 mcg by mouth daily.  cyanocobalamin (Vitamin B-12) 100 mcg tablet Take 100 mcg by mouth daily.  melatonin 3 mg tablet TAKE 1 TABLET BY MOUTH EVERY NIGHT AS NEEDED FOR SLEEP      potassium chloride (K-DUR, KLOR-CON) 20 mEq tablet Take 1 Tab by mouth two (2) times a day. (Patient taking differently: Take 20 mEq by mouth daily.) 60 Tab 0    cholecalciferol (VITAMIN D3) (1000 Units /25 mcg) tablet Take 1,000 Units by mouth daily.  LORazepam (ATIVAN) 0.5 mg tablet Take 0.5 mg by mouth daily.  montelukast (SINGULAIR) 10 mg tablet nightly.  pantoprazole (PROTONIX) 40 mg tablet daily.  aspirin (ASPIRIN) 325 mg tablet Take 1 Tab by mouth daily.  Per patient instructed to hold for 7 days--pt stopped 3/20/21       Current Facility-Administered Medications   Medication Dose Route Frequency Provider Last Rate Last Admin    sodium chloride (NS) flush 10-40 mL  10-40 mL IntraVENous PRN Zoë Andrade MD   9.5 mL at 05/02/21 8494     Facility-Administered Medications Ordered in Other Encounters   Medication Dose Route Frequency Provider Last Rate Last Admin    sodium chloride 0.9 % bolus infusion 1,000 mL  1,000 mL IntraVENous CONTINUOUS Nissa Nieto MD   Stopped at 05/01/21 1000       OBJECTIVE:  Visit Vitals  /62 (BP 1 Location: Right upper arm, BP Patient Position: At rest)   Pulse (!) 109   Temp 98.5 °F (36.9 °C)   Resp 18   Wt 163 lb (73.9 kg)   SpO2 95%   BMI 23.39 kg/m²       Physical Exam:  Constitutional: Well developed, well nourished male in no acute distress, sitting comfortably on the examination table. HEENT: Normocephalic and atraumatic. Oropharynx is clear, mucous membranes are moist.  Sclerae anicteric. Neck supple without JVD. No thyromegaly present. Skin Warm and dry. No bruising and no rash noted. No erythema. No pallor. Respiratory Lungs are clear to auscultation bilaterally without wheezes, rales or rhonchi, normal air exchange without accessory muscle use. CVS Normal rate, regular rhythm and normal S1 and S2. No murmurs, gallops, or rubs. Abdomen Soft, nontender and nondistended, normoactive bowel sounds. No palpable mass. No hepatosplenomegaly. Neuro Grossly nonfocal with no obvious sensory or motor deficits. MSK Normal range of motion in general.  No edema and no tenderness. Psych Appropriate mood and affect. Labs:  Recent Results (from the past 24 hour(s))   CBC WITH AUTOMATED DIFF    Collection Time: 05/02/21  8:30 AM   Result Value Ref Range    WBC 0.1 (LL) 4.3 - 11.1 K/uL    RBC 4.37 4.23 - 5.6 M/uL    HGB 11.4 (L) 13.6 - 17.2 g/dL    HCT 34.3 %    MCV 78.5 (L) 79.6 - 97.8 FL    MCH 26.1 26.1 - 32.9 PG    MCHC 33.2 31.4 - 35.0 g/dL    RDW 15.4 (H) 11.9 - 14.6 %    PLATELET 12 (LL) 194 - 450 K/uL    MPV Unable to calculate. Recommend adding IPF.  9.4 - 12.3 FL    ABSOLUTE NRBC 0.00 0.0 - 0.2 K/uL    RBC COMMENTS OCCASIONAL  OVALOCYTES        WBC COMMENTS WBC TOO FEW TO DIFFERENTIATE      PLATELET COMMENTS MARKED      DF MANUAL     METABOLIC PANEL, BASIC    Collection Time: 05/02/21  8:30 AM   Result Value Ref Range    Sodium 133 (L) 136 - 145 mmol/L    Potassium 4.0 3.5 - 5.1 mmol/L    Chloride 99 98 - 107 mmol/L    CO2 26 21 - 32 mmol/L    Anion gap 8 7 - 16 mmol/L    Glucose 118 (H) 65 - 100 mg/dL    BUN 9 6 - 23 MG/DL    Creatinine 0.50 (L) 0.8 - 1.5 MG/DL    GFR est AA >60 >60 ml/min/1.73m2    GFR est non-AA >60 >60 ml/min/1.73m2    Calcium 8.6 8.3 - 10.4 MG/DL        Imaging:  No procedure found. ASSESSMENT:    ICD-10-CM ICD-9-CM    1. Multiple myeloma not having achieved remission (HCC)  C90.00 203.00 VERIFY LABS      TREATMENT CONDITIONS      REGIMEN NOTES/COMMUNICATIONS      dextrose 5% - 0.45% NaCl with KCl 20 mEq/L infusion 1,000 mL      VERIFY LABS      TREATMENT CONDITIONS      REGIMEN NOTES/COMMUNICATIONS      tbo-filgrastim (GRANIX) injection 300 mcg           PLAN:  Lab studies were personally reviewed. MM: diagnosed 2020, completed 4 cycles of RVd with AL, now autoSCT with D0 on 4/23/21. Here for daily assessment in infusion, day +9 today. He remains very fatigued, but ECOG 1, able to do all ADLs. Nausea with poor appetite but trying to maintain his PO intake. Some loose stools but no queta diarrhea. No fevers or infectious symptoms. No pain except when having a BM (stomach cramps). Labs reviewed, WBC 0.1, platelets down to 17A, not to transfusion threshold yet. Hgb remains adequate, renal function excellent. Continue supportive care, encouraged good PO intake. Continue daily infusion visits, likely platelets tomorrow. All questions were asked and answered to the best of my ability.            Jason Arcos MD  [facility]  [address] [facility], [address] [place] [address]  Office : [phone]  Fax : [phone]

## 2021-05-02 NOTE — PROGRESS NOTES
Diagnosis: Multiple Myeloma  Transplant Date: 4/23/2021  Day: (+/-) +9. Chemo regimen used: Melphalan  Labs needed at next visit: see orders. Labs not resulted that need follow-up: none. Next Appointment scheduled: 5/3/2021 @ 8 am.  BMT assessments and toxicities completed. WBC/ANC= 0.1/too few to count. Prophylactic medications started Day +1. Granix started on Day+ 6 (4/29). Toxicities greater than 2: none. Bactrim or equivalent initiated on Day +30. Patient seen by MD/NP (Dr. Delfina Nickerson) until engraftment. New orders received: Plt 12. Per Dr. Delfina Nickerson, 1u platelets ordered to be given tomorrow (5/3). Issues: Ongoing tachycardia. Patient reports loose and at times watery stools ongoing since the transplant date (4/23) with 3 stools in the last 24 hours. Patient had no stools during infusion appointment so no sample was able to be collected. Patient states the roof of his mouth feels raw and he has had an increase of mucus production. Patient is unable to eat solid food due to this and nausea, but is drinking ensure and fluids. Patient down 14 lbs from baseline. Arrived to the [facility]. 1L [id number] and 1L normal saline and granix completed. Patient tolerated well. Discharged via wheelchair with spouse.

## 2021-05-03 ENCOUNTER — HOSPITAL ENCOUNTER (OUTPATIENT)
Dept: INFUSION THERAPY | Age: 56
Discharge: HOME | End: 2021-05-03
Payer: MEDICARE

## 2021-05-03 ENCOUNTER — PATIENT OUTREACH (OUTPATIENT)
Dept: CASE MANAGEMENT | Age: 56
End: 2021-05-03

## 2021-05-03 VITALS
OXYGEN SATURATION: 97 % | WEIGHT: 165.3 LBS | SYSTOLIC BLOOD PRESSURE: 102 MMHG | HEART RATE: 107 BPM | TEMPERATURE: 98 F | DIASTOLIC BLOOD PRESSURE: 67 MMHG | RESPIRATION RATE: 18 BRPM | BODY MASS INDEX: 23.72 KG/M2

## 2021-05-03 DIAGNOSIS — C90.00 MULTIPLE MYELOMA NOT HAVING ACHIEVED REMISSION (HCC): Primary | ICD-10-CM

## 2021-05-03 LAB
ALBUMIN SERPL-MCNC: 2.4 G/DL (ref 3.5–5)
ALBUMIN/GLOB SERPL: 0.8 {RATIO} (ref 1.2–3.5)
ALP SERPL-CCNC: 68 U/L (ref 50–136)
ALT SERPL-CCNC: 15 U/L (ref 12–65)
ANION GAP SERPL CALC-SCNC: 8 MMOL/L (ref 7–16)
AST SERPL-CCNC: 8 U/L (ref 15–37)
BILIRUB SERPL-MCNC: 0.9 MG/DL (ref 0.2–1.1)
BUN SERPL-MCNC: 8 MG/DL (ref 6–23)
CALCIUM SERPL-MCNC: 8.3 MG/DL (ref 8.3–10.4)
CHLORIDE SERPL-SCNC: 101 MMOL/L (ref 98–107)
CO2 SERPL-SCNC: 25 MMOL/L (ref 21–32)
CREAT SERPL-MCNC: 0.6 MG/DL (ref 0.8–1.5)
DIFFERENTIAL METHOD BLD: ABNORMAL
DIFFERENTIAL METHOD BLD: ABNORMAL
ERYTHROCYTE [DISTWIDTH] IN BLOOD BY AUTOMATED COUNT: 15.4 % (ref 11.9–14.6)
ERYTHROCYTE [DISTWIDTH] IN BLOOD BY AUTOMATED COUNT: 15.7 % (ref 11.9–14.6)
GGT SERPL-CCNC: 93 U/L (ref 15–85)
GLOBULIN SER CALC-MCNC: 3.2 G/DL (ref 2.3–3.5)
GLUCOSE SERPL-MCNC: 102 MG/DL (ref 65–100)
HCT VFR BLD AUTO: 31.7 %
HCT VFR BLD AUTO: 34.3 %
HGB BLD-MCNC: 10.5 G/DL (ref 13.6–17.2)
HGB BLD-MCNC: 11.4 G/DL (ref 13.6–17.2)
LDH SERPL L TO P-CCNC: 120 U/L (ref 100–190)
MAGNESIUM SERPL-MCNC: 1.7 MG/DL (ref 1.8–2.4)
MCH RBC QN AUTO: 25.9 PG (ref 26.1–32.9)
MCH RBC QN AUTO: 26.1 PG (ref 26.1–32.9)
MCHC RBC AUTO-ENTMCNC: 33.1 G/DL (ref 31.4–35)
MCHC RBC AUTO-ENTMCNC: 33.2 G/DL (ref 31.4–35)
MCV RBC AUTO: 78.3 FL (ref 79.6–97.8)
MCV RBC AUTO: 78.5 FL (ref 79.6–97.8)
NRBC # BLD: 0 K/UL (ref 0–0.2)
NRBC # BLD: 0 K/UL (ref 0–0.2)
PHOSPHATE SERPL-MCNC: 2.6 MG/DL (ref 2.5–4.5)
PLATELET # BLD AUTO: 12 K/UL (ref 150–450)
PLATELET # BLD AUTO: 6 K/UL (ref 150–450)
PLATELET COMMENTS,PCOM: ABNORMAL
PLATELET COMMENTS,PCOM: ABNORMAL
PMV BLD AUTO: ABNORMAL FL (ref 9.4–12.3)
PMV BLD AUTO: ABNORMAL FL (ref 9.4–12.3)
POTASSIUM SERPL-SCNC: 4 MMOL/L (ref 3.5–5.1)
PROT SERPL-MCNC: 5.6 G/DL (ref 6.3–8.2)
RBC # BLD AUTO: 4.05 M/UL (ref 4.23–5.6)
RBC # BLD AUTO: 4.37 M/UL (ref 4.23–5.6)
RBC MORPH BLD: ABNORMAL
SODIUM SERPL-SCNC: 134 MMOL/L (ref 136–145)
WBC # BLD AUTO: 0.1 K/UL (ref 4.3–11.1)
WBC # BLD AUTO: 0.2 K/UL (ref 4.3–11.1)
WBC MORPH BLD: ABNORMAL
WBC MORPH BLD: ABNORMAL

## 2021-05-03 PROCEDURE — 36430 TRANSFUSION BLD/BLD COMPNT: CPT

## 2021-05-03 PROCEDURE — 85025 COMPLETE CBC W/AUTO DIFF WBC: CPT

## 2021-05-03 PROCEDURE — 74011250637 HC RX REV CODE- 250/637: Performed by: INTERNAL MEDICINE

## 2021-05-03 PROCEDURE — 96365 THER/PROPH/DIAG IV INF INIT: CPT

## 2021-05-03 PROCEDURE — 80053 COMPREHEN METABOLIC PANEL: CPT

## 2021-05-03 PROCEDURE — 74011250636 HC RX REV CODE- 250/636: Performed by: INTERNAL MEDICINE

## 2021-05-03 PROCEDURE — 82977 ASSAY OF GGT: CPT

## 2021-05-03 PROCEDURE — 83735 ASSAY OF MAGNESIUM: CPT

## 2021-05-03 PROCEDURE — 83615 LACTATE (LD) (LDH) ENZYME: CPT

## 2021-05-03 PROCEDURE — 77030018667 ADMN ST IV BLD FENW -A

## 2021-05-03 PROCEDURE — 96372 THER/PROPH/DIAG INJ SC/IM: CPT

## 2021-05-03 PROCEDURE — 84100 ASSAY OF PHOSPHORUS: CPT

## 2021-05-03 PROCEDURE — P9037 PLATE PHERES LEUKOREDU IRRAD: HCPCS

## 2021-05-03 RX ORDER — SODIUM CHLORIDE 9 MG/ML
1000 INJECTION, SOLUTION INTRAVENOUS ONCE
Status: COMPLETED | OUTPATIENT
Start: 2021-05-03 | End: 2021-05-03

## 2021-05-03 RX ORDER — DIPHENHYDRAMINE HCL 25 MG
25 CAPSULE ORAL ONCE
Status: COMPLETED | OUTPATIENT
Start: 2021-05-03 | End: 2021-05-03

## 2021-05-03 RX ORDER — SODIUM CHLORIDE 0.9 % (FLUSH) 0.9 %
10 SYRINGE (ML) INJECTION AS NEEDED
Status: DISCONTINUED | OUTPATIENT
Start: 2021-05-03 | End: 2021-05-05 | Stop reason: HOSPADM

## 2021-05-03 RX ORDER — DEXTROSE, SODIUM CHLORIDE, AND POTASSIUM CHLORIDE 5; .45; .15 G/100ML; G/100ML; G/100ML
1000 INJECTION INTRAVENOUS
Status: COMPLETED | OUTPATIENT
Start: 2021-05-03 | End: 2021-05-03

## 2021-05-03 RX ORDER — ACETAMINOPHEN 325 MG/1
650 TABLET ORAL ONCE
Status: COMPLETED | OUTPATIENT
Start: 2021-05-03 | End: 2021-05-03

## 2021-05-03 RX ORDER — MAGNESIUM SULFATE HEPTAHYDRATE 40 MG/ML
2 INJECTION, SOLUTION INTRAVENOUS ONCE
Status: COMPLETED | OUTPATIENT
Start: 2021-05-03 | End: 2021-05-03

## 2021-05-03 RX ORDER — SODIUM CHLORIDE 9 MG/ML
250 INJECTION, SOLUTION INTRAVENOUS AS NEEDED
Status: DISCONTINUED | OUTPATIENT
Start: 2021-05-03 | End: 2021-05-05 | Stop reason: HOSPADM

## 2021-05-03 RX ADMIN — MAGNESIUM SULFATE HEPTAHYDRATE 2 G: 40 INJECTION, SOLUTION INTRAVENOUS at 09:11

## 2021-05-03 RX ADMIN — DEXTROSE, SODIUM CHLORIDE, AND POTASSIUM CHLORIDE 1000 ML: 5; .45; .15 INJECTION INTRAVENOUS at 08:29

## 2021-05-03 RX ADMIN — DIPHENHYDRAMINE HYDROCHLORIDE 25 MG: 25 CAPSULE ORAL at 10:34

## 2021-05-03 RX ADMIN — SODIUM CHLORIDE 1000 ML: 9 INJECTION, SOLUTION INTRAVENOUS at 08:10

## 2021-05-03 RX ADMIN — Medication 20 ML: at 11:46

## 2021-05-03 RX ADMIN — ACETAMINOPHEN 650 MG: 325 TABLET ORAL at 10:34

## 2021-05-03 RX ADMIN — TBO-FILGRASTIM 300 MCG: 300 INJECTION, SOLUTION SUBCUTANEOUS at 10:40

## 2021-05-03 NOTE — PROGRESS NOTES
5/3 Pt see in infusion. Today he is D+10. Pt is complaining of what he is stating is an \"ear infection\". Having pain when swallowing on his left side which he is feeling in his ear. He is also having some loose stools. Sending a script for magic mouthwash for pt to use. Pt will get some platelets today as well.

## 2021-05-03 NOTE — PROGRESS NOTES
Diagnosis: Multiple Myeloma  Transplant Date: 4/23/2021  Day: (+/-) +10. Chemo regimen used: Melphalan  Labs needed at next visit: see orders. Labs not resulted that need follow-up: none. Next Appointment scheduled: 5/4/2021 @ 8 am.  BMT assessments and toxicities completed. WBC/ANC= 0.2/too few to count. Prophylactic medications started Day +1. Granix started on Day+ 6 (4/29). Toxicities greater than 2: none. Bactrim or equivalent initiated on Day +30. Patient seen by MD/NP Teddy Gilford NP until engraftment. New orders received:  1lt .45+20K, 1lt NS, 2grams magnesium, granix, and 1unit of platelets. Issues: Ongoing tachycardia, loose stools, roof of his mouth feels raw and patient states he thinks he has an ear infection.       Arrived to the [facility]. Labs drawn. Pt encouraged to increase ensure to 3 per day. Rx called in for magic mouthwash and imodium. Patient tolerated well all of the above well. Discharged via wheelchair with spouse.

## 2021-05-04 ENCOUNTER — HOSPITAL ENCOUNTER (OUTPATIENT)
Dept: INFUSION THERAPY | Age: 56
Discharge: HOME | End: 2021-05-04
Payer: MEDICARE

## 2021-05-04 VITALS
OXYGEN SATURATION: 98 % | SYSTOLIC BLOOD PRESSURE: 105 MMHG | TEMPERATURE: 98.7 F | BODY MASS INDEX: 23.45 KG/M2 | HEART RATE: 113 BPM | WEIGHT: 163.4 LBS | RESPIRATION RATE: 18 BRPM | DIASTOLIC BLOOD PRESSURE: 75 MMHG

## 2021-05-04 DIAGNOSIS — C90.00 MULTIPLE MYELOMA NOT HAVING ACHIEVED REMISSION (HCC): Primary | ICD-10-CM

## 2021-05-04 LAB
ANION GAP SERPL CALC-SCNC: 8 MMOL/L (ref 7–16)
BASOPHILS # BLD: 0 K/UL (ref 0–0.2)
BASOPHILS NFR BLD: 0 % (ref 0–2)
BLD PROD TYP BPU: NORMAL
BPU ID: NORMAL
BUN SERPL-MCNC: 9 MG/DL (ref 6–23)
CALCIUM SERPL-MCNC: 8.3 MG/DL (ref 8.3–10.4)
CHLORIDE SERPL-SCNC: 103 MMOL/L (ref 98–107)
CO2 SERPL-SCNC: 24 MMOL/L (ref 21–32)
CREAT SERPL-MCNC: 0.5 MG/DL (ref 0.8–1.5)
DIFFERENTIAL METHOD BLD: ABNORMAL
EOSINOPHIL # BLD: 0 K/UL (ref 0–0.8)
EOSINOPHIL NFR BLD: 0 % (ref 0.5–7.8)
ERYTHROCYTE [DISTWIDTH] IN BLOOD BY AUTOMATED COUNT: 15.7 % (ref 11.9–14.6)
GLUCOSE SERPL-MCNC: 88 MG/DL (ref 65–100)
HCT VFR BLD AUTO: 31 %
HGB BLD-MCNC: 10.2 G/DL (ref 13.6–17.2)
IMM GRANULOCYTES # BLD AUTO: 0 K/UL (ref 0–0.5)
IMM GRANULOCYTES NFR BLD AUTO: 0 % (ref 0–5)
LYMPHOCYTES # BLD: 0.1 K/UL (ref 0.5–4.6)
LYMPHOCYTES NFR BLD: 22 % (ref 13–44)
MAGNESIUM SERPL-MCNC: 2.1 MG/DL (ref 1.8–2.4)
MCH RBC QN AUTO: 25.8 PG (ref 26.1–32.9)
MCHC RBC AUTO-ENTMCNC: 32.9 G/DL (ref 31.4–35)
MCV RBC AUTO: 78.3 FL (ref 79.6–97.8)
MONOCYTES # BLD: 0.1 K/UL (ref 0.1–1.3)
MONOCYTES NFR BLD: 24 % (ref 4–12)
NEUTS SEG # BLD: 0.4 K/UL (ref 1.7–8.2)
NEUTS SEG NFR BLD: 54 % (ref 43–78)
NRBC # BLD: 0 K/UL (ref 0–0.2)
PLATELET # BLD AUTO: 44 K/UL (ref 150–450)
PLATELET COMMENTS,PCOM: ABNORMAL
PMV BLD AUTO: 9.1 FL (ref 9.4–12.3)
POTASSIUM SERPL-SCNC: 3.6 MMOL/L (ref 3.5–5.1)
RBC # BLD AUTO: 3.96 M/UL (ref 4.23–5.6)
RBC MORPH BLD: ABNORMAL
SODIUM SERPL-SCNC: 135 MMOL/L (ref 136–145)
STATUS OF UNIT,%ST: NORMAL
UNIT DIVISION, %UDIV: 0
WBC # BLD AUTO: 0.6 K/UL (ref 4.3–11.1)
WBC MORPH BLD: ABNORMAL

## 2021-05-04 PROCEDURE — 74011250637 HC RX REV CODE- 250/637: Performed by: NURSE PRACTITIONER

## 2021-05-04 PROCEDURE — 96360 HYDRATION IV INFUSION INIT: CPT

## 2021-05-04 PROCEDURE — 96361 HYDRATE IV INFUSION ADD-ON: CPT

## 2021-05-04 PROCEDURE — 74011250636 HC RX REV CODE- 250/636: Performed by: INTERNAL MEDICINE

## 2021-05-04 PROCEDURE — 80048 BASIC METABOLIC PNL TOTAL CA: CPT

## 2021-05-04 PROCEDURE — 96372 THER/PROPH/DIAG INJ SC/IM: CPT

## 2021-05-04 PROCEDURE — 83735 ASSAY OF MAGNESIUM: CPT

## 2021-05-04 PROCEDURE — 85025 COMPLETE CBC W/AUTO DIFF WBC: CPT

## 2021-05-04 RX ORDER — DEXTROSE, SODIUM CHLORIDE, AND POTASSIUM CHLORIDE 5; .45; .15 G/100ML; G/100ML; G/100ML
1000 INJECTION INTRAVENOUS
Status: COMPLETED | OUTPATIENT
Start: 2021-05-04 | End: 2021-05-04

## 2021-05-04 RX ORDER — SODIUM CHLORIDE 0.9 % (FLUSH) 0.9 %
20 SYRINGE (ML) INJECTION EVERY 8 HOURS
Status: DISCONTINUED | OUTPATIENT
Start: 2021-05-04 | End: 2021-05-06 | Stop reason: HOSPADM

## 2021-05-04 RX ORDER — SODIUM CHLORIDE 9 MG/ML
1000 INJECTION, SOLUTION INTRAVENOUS ONCE
Status: COMPLETED | OUTPATIENT
Start: 2021-05-04 | End: 2021-05-04

## 2021-05-04 RX ORDER — LOPERAMIDE HYDROCHLORIDE 2 MG/1
4 CAPSULE ORAL AS NEEDED
Status: DISCONTINUED | OUTPATIENT
Start: 2021-05-04 | End: 2021-05-06 | Stop reason: HOSPADM

## 2021-05-04 RX ORDER — SODIUM CHLORIDE 0.9 % (FLUSH) 0.9 %
10 SYRINGE (ML) INJECTION EVERY 8 HOURS
Status: CANCELLED | OUTPATIENT
Start: 2021-05-04

## 2021-05-04 RX ORDER — DIPHENOXYLATE HYDROCHLORIDE AND ATROPINE SULFATE 2.5; .025 MG/1; MG/1
1 TABLET ORAL AS NEEDED
Status: DISCONTINUED | OUTPATIENT
Start: 2021-05-04 | End: 2021-05-04 | Stop reason: RX

## 2021-05-04 RX ADMIN — DEXTROSE, SODIUM CHLORIDE, AND POTASSIUM CHLORIDE 1000 ML: 5; .45; .15 INJECTION INTRAVENOUS at 08:40

## 2021-05-04 RX ADMIN — Medication 20 ML: at 11:28

## 2021-05-04 RX ADMIN — SODIUM CHLORIDE 1000 ML: 9 INJECTION, SOLUTION INTRAVENOUS at 08:30

## 2021-05-04 RX ADMIN — LOPERAMIDE HYDROCHLORIDE 4 MG: 2 CAPSULE ORAL at 10:28

## 2021-05-04 RX ADMIN — TBO-FILGRASTIM 300 MCG: 300 INJECTION, SOLUTION SUBCUTANEOUS at 09:54

## 2021-05-04 NOTE — PROGRESS NOTES
Diagnosis: Multiple Myeloma  Transplant Date: 4/23/2021  Day: (+/-) +11. Chemo regimen used: Melphalan  Labs needed at next visit: see orders. Labs not resulted that need follow-up: none. Next Appointment [id number]: 1/7/1827 @ 8 am.  BMT assessments and toxicities completed. WBC/ANC= 0.6/0.4  Prophylactic medications started Day +1. Granix started on Day+ 6 (4/29). Toxicities greater than 2: none. Bactrim or equivalent initiated on Day +30.    Patient seen by MD/NP Sydney Clark NP until engraftment.     New orders received:  Imodinum 2ng tabs x2. Issues: Ongoing tachycardia and loose stools.       Arrived to the [facility] via [facility] Labs drawn. .Today pt got 1LtNS, 1LT.45=20K, Granx and Imodium. Patient tolerated well.   Discharged via wheelchair with spouse

## 2021-05-05 ENCOUNTER — HOSPITAL ENCOUNTER (OUTPATIENT)
Dept: INFUSION THERAPY | Age: 56
Discharge: HOME | End: 2021-05-05
Payer: MEDICARE

## 2021-05-05 VITALS
WEIGHT: 163.2 LBS | HEART RATE: 116 BPM | BODY MASS INDEX: 23.42 KG/M2 | SYSTOLIC BLOOD PRESSURE: 92 MMHG | RESPIRATION RATE: 18 BRPM | DIASTOLIC BLOOD PRESSURE: 62 MMHG | TEMPERATURE: 97.9 F

## 2021-05-05 DIAGNOSIS — C90.00 MULTIPLE MYELOMA NOT HAVING ACHIEVED REMISSION (HCC): Primary | ICD-10-CM

## 2021-05-05 LAB
ALBUMIN SERPL-MCNC: 2.4 G/DL (ref 3.5–5)
ALBUMIN/GLOB SERPL: 0.7 {RATIO} (ref 1.2–3.5)
ALP SERPL-CCNC: 76 U/L (ref 50–136)
ALT SERPL-CCNC: 14 U/L (ref 12–65)
ANION GAP SERPL CALC-SCNC: 7 MMOL/L (ref 7–16)
AST SERPL-CCNC: 7 U/L (ref 15–37)
BASOPHILS # BLD: 0 K/UL (ref 0–0.2)
BASOPHILS NFR BLD: 1 % (ref 0–2)
BILIRUB SERPL-MCNC: 0.3 MG/DL (ref 0.2–1.1)
BUN SERPL-MCNC: 8 MG/DL (ref 6–23)
C DIFF GDH STL QL: NORMAL
C DIFF TOX A+B STL QL IA: NORMAL
CALCIUM SERPL-MCNC: 7.7 MG/DL (ref 8.3–10.4)
CHLORIDE SERPL-SCNC: 107 MMOL/L (ref 98–107)
CLINICAL CONSIDERATION: NORMAL
CO2 SERPL-SCNC: 25 MMOL/L (ref 21–32)
CREAT SERPL-MCNC: 0.5 MG/DL (ref 0.8–1.5)
DIFFERENTIAL METHOD BLD: ABNORMAL
EOSINOPHIL # BLD: 0 K/UL (ref 0–0.8)
EOSINOPHIL NFR BLD: 0 % (ref 0.5–7.8)
ERYTHROCYTE [DISTWIDTH] IN BLOOD BY AUTOMATED COUNT: 15.9 % (ref 11.9–14.6)
GGT SERPL-CCNC: 89 U/L (ref 15–85)
GLOBULIN SER CALC-MCNC: 3.5 G/DL (ref 2.3–3.5)
GLUCOSE SERPL-MCNC: 94 MG/DL (ref 65–100)
HCT VFR BLD AUTO: 30 %
HGB BLD-MCNC: 10 G/DL (ref 13.6–17.2)
IMM GRANULOCYTES # BLD AUTO: 0.1 K/UL (ref 0–0.5)
IMM GRANULOCYTES NFR BLD AUTO: 5 % (ref 0–5)
INTERPRETATION: NORMAL
LDH SERPL L TO P-CCNC: 138 U/L (ref 100–190)
LYMPHOCYTES # BLD: 0.7 K/UL (ref 0.5–4.6)
LYMPHOCYTES NFR BLD: 30 % (ref 13–44)
MAGNESIUM SERPL-MCNC: 2.2 MG/DL (ref 1.8–2.4)
MCH RBC QN AUTO: 26.2 PG (ref 26.1–32.9)
MCHC RBC AUTO-ENTMCNC: 33.3 G/DL (ref 31.4–35)
MCV RBC AUTO: 78.7 FL (ref 79.6–97.8)
MONOCYTES # BLD: 0.1 K/UL (ref 0.1–1.3)
MONOCYTES NFR BLD: 4 % (ref 4–12)
NEUTS SEG # BLD: 1.3 K/UL (ref 1.7–8.2)
NEUTS SEG NFR BLD: 60 % (ref 43–78)
NRBC # BLD: 0 K/UL (ref 0–0.2)
PCR REFLEX: NORMAL
PHOSPHATE SERPL-MCNC: 1.6 MG/DL (ref 2.5–4.5)
PLATELET # BLD AUTO: 42 K/UL (ref 150–450)
PLATELET COMMENTS,PCOM: ABNORMAL
PMV BLD AUTO: 10.1 FL (ref 9.4–12.3)
POTASSIUM SERPL-SCNC: 3.2 MMOL/L (ref 3.5–5.1)
PROT SERPL-MCNC: 5.9 G/DL (ref 6.3–8.2)
RBC # BLD AUTO: 3.81 M/UL (ref 4.23–5.6)
RBC MORPH BLD: ABNORMAL
SODIUM SERPL-SCNC: 139 MMOL/L (ref 136–145)
WBC # BLD AUTO: 2.2 K/UL (ref 4.3–11.1)
WBC MORPH BLD: ABNORMAL

## 2021-05-05 PROCEDURE — 83615 LACTATE (LD) (LDH) ENZYME: CPT

## 2021-05-05 PROCEDURE — 84100 ASSAY OF PHOSPHORUS: CPT

## 2021-05-05 PROCEDURE — 96366 THER/PROPH/DIAG IV INF ADDON: CPT

## 2021-05-05 PROCEDURE — 87449 NOS EACH ORGANISM AG IA: CPT

## 2021-05-05 PROCEDURE — 96365 THER/PROPH/DIAG IV INF INIT: CPT

## 2021-05-05 PROCEDURE — 96375 TX/PRO/DX INJ NEW DRUG ADDON: CPT

## 2021-05-05 PROCEDURE — 74011250636 HC RX REV CODE- 250/636: Performed by: INTERNAL MEDICINE

## 2021-05-05 PROCEDURE — 80053 COMPREHEN METABOLIC PANEL: CPT

## 2021-05-05 PROCEDURE — 74011250637 HC RX REV CODE- 250/637: Performed by: NURSE PRACTITIONER

## 2021-05-05 PROCEDURE — 96372 THER/PROPH/DIAG INJ SC/IM: CPT

## 2021-05-05 PROCEDURE — 82977 ASSAY OF GGT: CPT

## 2021-05-05 PROCEDURE — 96361 HYDRATE IV INFUSION ADD-ON: CPT

## 2021-05-05 PROCEDURE — 83735 ASSAY OF MAGNESIUM: CPT

## 2021-05-05 PROCEDURE — 74011250636 HC RX REV CODE- 250/636: Performed by: NURSE PRACTITIONER

## 2021-05-05 PROCEDURE — 85025 COMPLETE CBC W/AUTO DIFF WBC: CPT

## 2021-05-05 PROCEDURE — 99214 OFFICE O/P EST MOD 30 MIN: CPT | Performed by: NURSE PRACTITIONER

## 2021-05-05 RX ORDER — SODIUM CHLORIDE 0.9 % (FLUSH) 0.9 %
20-40 SYRINGE (ML) INJECTION EVERY 8 HOURS
Status: CANCELLED | OUTPATIENT
Start: 2021-05-05

## 2021-05-05 RX ORDER — POTASSIUM CHLORIDE 14.9 MG/ML
20 INJECTION INTRAVENOUS ONCE
Status: COMPLETED | OUTPATIENT
Start: 2021-05-05 | End: 2021-05-05

## 2021-05-05 RX ORDER — DEXTROSE, SODIUM CHLORIDE, AND POTASSIUM CHLORIDE 5; .45; .15 G/100ML; G/100ML; G/100ML
1000 INJECTION INTRAVENOUS
Status: COMPLETED | OUTPATIENT
Start: 2021-05-05 | End: 2021-05-05

## 2021-05-05 RX ORDER — SODIUM CHLORIDE 9 MG/ML
1000 INJECTION, SOLUTION INTRAVENOUS ONCE
Status: COMPLETED | OUTPATIENT
Start: 2021-05-05 | End: 2021-05-05

## 2021-05-05 RX ORDER — SODIUM CHLORIDE 0.9 % (FLUSH) 0.9 %
10 SYRINGE (ML) INJECTION EVERY 8 HOURS
Status: DISCONTINUED | OUTPATIENT
Start: 2021-05-05 | End: 2021-05-07 | Stop reason: HOSPADM

## 2021-05-05 RX ORDER — ONDANSETRON 2 MG/ML
8 INJECTION INTRAMUSCULAR; INTRAVENOUS ONCE
Status: COMPLETED | OUTPATIENT
Start: 2021-05-05 | End: 2021-05-05

## 2021-05-05 RX ORDER — OXYCODONE HYDROCHLORIDE 5 MG/1
10 TABLET ORAL ONCE
Status: COMPLETED | OUTPATIENT
Start: 2021-05-05 | End: 2021-05-05

## 2021-05-05 RX ADMIN — TBO-FILGRASTIM 300 MCG: 300 INJECTION, SOLUTION SUBCUTANEOUS at 09:19

## 2021-05-05 RX ADMIN — Medication 20 ML: at 09:51

## 2021-05-05 RX ADMIN — SODIUM CHLORIDE 1000 ML: 9 INJECTION, SOLUTION INTRAVENOUS at 07:55

## 2021-05-05 RX ADMIN — ONDANSETRON 8 MG: 2 INJECTION INTRAMUSCULAR; INTRAVENOUS at 09:55

## 2021-05-05 RX ADMIN — DEXTROSE, SODIUM CHLORIDE, AND POTASSIUM CHLORIDE 1000 ML: 5; .45; .15 INJECTION INTRAVENOUS at 07:54

## 2021-05-05 RX ADMIN — OXYCODONE 10 MG: 5 TABLET ORAL at 12:06

## 2021-05-05 RX ADMIN — POTASSIUM CHLORIDE 20 MEQ: 14.9 INJECTION, SOLUTION INTRAVENOUS at 10:04

## 2021-05-05 NOTE — PROGRESS NOTES
Diagnosis: Multiple Myeloma  Transplant Date: 4/23/2021  Day: (+/-) +12. Chemo regimen used: Melphalan  Labs needed at next visit: see orders. Labs not resulted that need follow-up: none. Next Appointment scheduled: 5/6/2021 @ 8 am.  BMT assessments and toxicities completed. WBC/ANC= 2.2/1.3  Prophylactic medications started Day +1. Granix started on Day+ 6 (4/29). Toxicities greater than 2: none. Bactrim or equivalent initiated on Day +30.    Patient seen by MD/NP Stephany Kunz NP until engraftment.     New orders received:  stool for C-DIFF, Phos packets to pharmacy x3days, 40mEq potassium and oxycodone. Issues: Ongoing tachycardia, loose stools, pain in left ear and mouth.       Arrived to the [facility] via [facility] drawn. Stool for C-diff sent, (it is negative) Today pt got 1LtNS, 1LT.45=20K, 20K for total 40mEq potassium, Granx and 10 mg oxycodone for C/O rochelle knee and lower back pain. Pt instructed to contiune taking lomotil. Patient tolerated well.   Discharged via wheelchair with spouse

## 2021-05-05 NOTE — PROGRESS NOTES
SW attempted to scheduled pt's [employer] [facility] for appts after 5/12 and received notification that pt is on \"rider-scheduling restrictions\" due to no-shows. SW updated pt and spouse, who confirmed the Denyce Codding was late several times and they had to take an Melvenia Pipe to arrive on time for BMT appts. SW stated pt must schedule rides after 5/12 till 6/20. SW provided print out of all ride requests previously faxed and they verbalized appreciation. No other needs identified at this time. SW encouraged them to call SW should need arise. They verbalized understanding.

## 2021-05-06 ENCOUNTER — HOSPITAL ENCOUNTER (OUTPATIENT)
Dept: INFUSION THERAPY | Age: 56
Discharge: HOME | End: 2021-05-06
Payer: MEDICARE

## 2021-05-06 VITALS
SYSTOLIC BLOOD PRESSURE: 102 MMHG | HEART RATE: 108 BPM | RESPIRATION RATE: 18 BRPM | TEMPERATURE: 97.8 F | BODY MASS INDEX: 23.42 KG/M2 | OXYGEN SATURATION: 96 % | DIASTOLIC BLOOD PRESSURE: 67 MMHG | WEIGHT: 163.2 LBS

## 2021-05-06 DIAGNOSIS — C90.00 MULTIPLE MYELOMA NOT HAVING ACHIEVED REMISSION (HCC): Primary | ICD-10-CM

## 2021-05-06 LAB
ANION GAP SERPL CALC-SCNC: 9 MMOL/L (ref 7–16)
BASOPHILS # BLD: 0.1 K/UL (ref 0–0.2)
BASOPHILS NFR BLD: 1 % (ref 0–2)
BUN SERPL-MCNC: 7 MG/DL (ref 6–23)
CALCIUM SERPL-MCNC: 7.4 MG/DL (ref 8.3–10.4)
CHLORIDE SERPL-SCNC: 112 MMOL/L (ref 98–107)
CO2 SERPL-SCNC: 21 MMOL/L (ref 21–32)
CREAT SERPL-MCNC: 0.5 MG/DL (ref 0.8–1.5)
DIFFERENTIAL METHOD BLD: ABNORMAL
EOSINOPHIL # BLD: 0 K/UL (ref 0–0.8)
EOSINOPHIL NFR BLD: 0 % (ref 0.5–7.8)
ERYTHROCYTE [DISTWIDTH] IN BLOOD BY AUTOMATED COUNT: 16.2 % (ref 11.9–14.6)
GLUCOSE SERPL-MCNC: 86 MG/DL (ref 65–100)
HCT VFR BLD AUTO: 31.6 %
HGB BLD-MCNC: 10.3 G/DL (ref 13.6–17.2)
IMM GRANULOCYTES # BLD AUTO: 0.4 K/UL (ref 0–0.5)
IMM GRANULOCYTES NFR BLD AUTO: 6 % (ref 0–5)
LYMPHOCYTES # BLD: 0.7 K/UL (ref 0.5–4.6)
LYMPHOCYTES NFR BLD: 11 % (ref 13–44)
MAGNESIUM SERPL-MCNC: 2.2 MG/DL (ref 1.8–2.4)
MCH RBC QN AUTO: 25.9 PG (ref 26.1–32.9)
MCHC RBC AUTO-ENTMCNC: 32.6 G/DL (ref 31.4–35)
MCV RBC AUTO: 79.4 FL (ref 79.6–97.8)
MONOCYTES # BLD: 0.9 K/UL (ref 0.1–1.3)
MONOCYTES NFR BLD: 13 % (ref 4–12)
NEUTS SEG # BLD: 4.6 K/UL (ref 1.7–8.2)
NEUTS SEG NFR BLD: 69 % (ref 43–78)
NRBC # BLD: 0 K/UL (ref 0–0.2)
PLATELET # BLD AUTO: 46 K/UL (ref 150–450)
PLATELET COMMENTS,PCOM: ABNORMAL
PMV BLD AUTO: ABNORMAL FL (ref 9.4–12.3)
POTASSIUM SERPL-SCNC: 3.2 MMOL/L (ref 3.5–5.1)
RBC # BLD AUTO: 3.98 M/UL (ref 4.23–5.6)
RBC MORPH BLD: ABNORMAL
SODIUM SERPL-SCNC: 142 MMOL/L (ref 136–145)
WBC # BLD AUTO: 6.7 K/UL (ref 4.3–11.1)
WBC MORPH BLD: ABNORMAL

## 2021-05-06 PROCEDURE — 96361 HYDRATE IV INFUSION ADD-ON: CPT

## 2021-05-06 PROCEDURE — 96366 THER/PROPH/DIAG IV INF ADDON: CPT

## 2021-05-06 PROCEDURE — 74011250636 HC RX REV CODE- 250/636: Performed by: INTERNAL MEDICINE

## 2021-05-06 PROCEDURE — 99214 OFFICE O/P EST MOD 30 MIN: CPT | Performed by: NURSE PRACTITIONER

## 2021-05-06 PROCEDURE — 96365 THER/PROPH/DIAG IV INF INIT: CPT

## 2021-05-06 PROCEDURE — 83735 ASSAY OF MAGNESIUM: CPT

## 2021-05-06 PROCEDURE — 80048 BASIC METABOLIC PNL TOTAL CA: CPT

## 2021-05-06 PROCEDURE — 85025 COMPLETE CBC W/AUTO DIFF WBC: CPT

## 2021-05-06 RX ORDER — SODIUM CHLORIDE 0.9 % (FLUSH) 0.9 %
20-40 SYRINGE (ML) INJECTION EVERY 8 HOURS
Status: DISCONTINUED | OUTPATIENT
Start: 2021-05-06 | End: 2021-05-08 | Stop reason: HOSPADM

## 2021-05-06 RX ORDER — POTASSIUM CHLORIDE 14.9 MG/ML
20 INJECTION INTRAVENOUS ONCE
Status: COMPLETED | OUTPATIENT
Start: 2021-05-06 | End: 2021-05-06

## 2021-05-06 RX ORDER — DEXTROSE, SODIUM CHLORIDE, AND POTASSIUM CHLORIDE 5; .45; .15 G/100ML; G/100ML; G/100ML
1000 INJECTION INTRAVENOUS
Status: COMPLETED | OUTPATIENT
Start: 2021-05-06 | End: 2021-05-06

## 2021-05-06 RX ADMIN — POTASSIUM CHLORIDE 20 MEQ: 14.9 INJECTION, SOLUTION INTRAVENOUS at 10:06

## 2021-05-06 RX ADMIN — SODIUM CHLORIDE 1000 ML: 900 INJECTION, SOLUTION INTRAVENOUS at 07:46

## 2021-05-06 RX ADMIN — DEXTROSE, SODIUM CHLORIDE, AND POTASSIUM CHLORIDE 1000 ML: 5; .45; .15 INJECTION INTRAVENOUS at 07:56

## 2021-05-06 RX ADMIN — Medication 20 ML: at 12:27

## 2021-05-06 RX ADMIN — Medication 20 ML: at 07:40

## 2021-05-06 NOTE — PROGRESS NOTES
[facility] Hematology and Oncology: Office Visit Established Patient    Chief Complaint:    No chief complaint on file. History of Present Illness:  Mr. Dominick Mejia is a 48 y.o. male who returns today for management of multiple myeloma. In 9/2020 he was diagnosed with Multiple Myeloma, Kappa light chain, del.13q, t( 11;14 ), ISS Stage 3; at diagnosis his Kappa level was 5824, he received 4 cycles of RVd and achieved a VGPR, his Kappa level decreased to 285. He underwent an Autologous PBHCT on 4/23/2021. Here for daily assessment in infusion, day +13 today. He reports having controlled nausea and has been taking Lomotil for loose stools, although not optimized on anti-diarrheals. C diff was negative. Appetite remains depressed, he is still eating bland foods/holding off on Ensure d/t diarrhea. There is some pain relief with MMW-one sore on left bottom gums. No cough, shortness of breath, or edema. Fatigue is present as expected. No fever, chills, or other infectious symptoms.            Review of Systems:  10 point review of systems is otherwise negative with the exception of the elements mentioned above in the HPI. No Known Allergies  Past Medical History:   Diagnosis Date    Cancer ([facility] [address] dx 1/2021    multiple myeloma----    GERD (gastroesophageal reflux disease)     controlled with med    Headache     CLUSTER PER G' FRIEND     Past Surgical History:   Procedure Laterality Date    HX KNEE REPLACEMENT Left 06/2000    HX KYPHOPLASTY  08/2020    HX OTHER SURGICAL      bone marrow biopsy    HX TRANSPLANT      Autologous Stem cell transplant    IR INSERT TUNL CVC W/O PORT OVER 5 YR  3/31/2021    IR US GUIDE NDL PLACE  3/29/2021     No family history on file.   Social History     Socioeconomic History    Marital status: SINGLE     Spouse name: Not on file    Number of children: Not on file    Years of education: Not on file    Highest education level: Not on file   Occupational History    Not on file   Social Needs    Financial resource strain: Not on file    Food insecurity     Worry: Not on file     Inability: Not on file    Transportation needs     Medical: Not on file     Non-medical: Not on file   Tobacco Use    Smoking status: Former Smoker     Packs/day: 1.00     Years: 4.00     Pack years: 4.00     Quit date:      Years since quittin.3    Smokeless tobacco: Never Used   Substance and Sexual Activity    Alcohol use: Not Currently     Frequency: Never     Binge frequency: Never    Drug use: Not Currently    Sexual activity: Not on file   Lifestyle    Physical activity     Days per week: Not on file     Minutes per session: Not on file    Stress: Not on file   Relationships    Social connections     Talks on phone: Not on file     Gets together: Not on file     Attends Gnosticism service: Not on file     Active member of club or organization: Not on file     Attends meetings of clubs or organizations: Not on file     Relationship status: Not on file    Intimate partner violence     Fear of current or ex partner: Not on file     Emotionally abused: Not on file     Physically abused: Not on file     Forced sexual activity: Not on file   Other Topics Concern     Service Not Asked    Blood Transfusions Not Asked    Caffeine Concern Not Asked    Occupational Exposure Not Asked   [employer] Hazards Not Asked    Sleep Concern Not Asked    Stress Concern Not Asked    Weight Concern Not Asked    Special Diet Not Asked    Back Care Not Asked    Exercise Not Asked    Bike Helmet Not Asked   [address] Not Asked    Self-Exams Not Asked   Social History Narrative    Not on file     Current Outpatient Medications   Medication Sig Dispense Refill    potassium, sodium phosphates (Phos-NaK) 280-160-250 mg packet Take 1 Packet by mouth three (3) times daily for 3 days.  Indications: low amount of phosphate in the blood 9 Packet 0    magic mouthwash (DAVID) susp Take 5 mL by mouth Before breakfast, lunch, and dinner. Magic Mouthwash  Maalox  Lidocaine 2% viscous  Diphenhydramine oral solution     Pharmacy to mix equal portions of ingredients to a total volume as indicated in the dispense amount 280 mL 3    loperamide (Imodium A-D) 2 mg tablet Take 1 Tab by mouth four (4) times daily as needed for Diarrhea for up to 15 days. 60 Tab 0    prochlorperazine (Compazine) 10 mg tablet Take 1 Tab by mouth every six (6) hours as needed for Nausea or Vomiting. 90 Tab 2    ondansetron hcl (ZOFRAN) 8 mg tablet Take 1 Tab by mouth every eight (8) hours as needed for Nausea or Vomiting. 90 Tab 1    acyclovir (ZOVIRAX) 400 mg tablet Take 1 Tab by mouth two (2) times a day. 60 Tab 5    gabapentin (NEURONTIN) 300 mg capsule Take 1 Cap by mouth three (3) times daily for 30 days. 90 Cap 3    cyclobenzaprine (FLEXERIL) 10 mg tablet Take 1 Tab by mouth as needed for Muscle Spasm(s). 30 Tab 1    zolpidem (AMBIEN) 5 mg tablet Take 1 Tab by mouth nightly as needed for Sleep. Max Daily Amount: 5 mg. 30 Tab 2    morphine CR (MS CONTIN) 60 mg CR tablet Take 1 Tab by mouth every twelve (12) hours for 30 days. Max Daily Amount: 120 mg. 60 Tab 0    vitamin k 100 mcg tablet Take 100 mcg by mouth daily.  cyanocobalamin (Vitamin B-12) 100 mcg tablet Take 100 mcg by mouth daily.  melatonin 3 mg tablet TAKE 1 TABLET BY MOUTH EVERY NIGHT AS NEEDED FOR SLEEP      potassium chloride (K-DUR, KLOR-CON) 20 mEq tablet Take 1 Tab by mouth two (2) times a day. (Patient taking differently: Take 20 mEq by mouth daily.) 60 Tab 0    cholecalciferol (VITAMIN D3) (1000 Units /25 mcg) tablet Take 1,000 Units by mouth daily.  LORazepam (ATIVAN) 0.5 mg tablet Take 0.5 mg by mouth daily.  montelukast (SINGULAIR) 10 mg tablet nightly.  pantoprazole (PROTONIX) 40 mg tablet daily.  aspirin (ASPIRIN) 325 mg tablet Take 1 Tab by mouth daily.  Per patient instructed to hold for 7 days--pt stopped 3/20/21 Current Facility-Administered Medications   Medication Dose Route Frequency Provider Last Rate Last Admin    central line flush (saline) syringe 20-40 mL  20-40 mL InterCATHeter Q8H Hemanth Benitez MD   20 mL at 05/06/21 0740    tbo-filgrastim (GRANIX) injection 300 mcg  300 mcg SubCUTAneous ONCE Hemanth Benitez MD        sodium chloride 0.9 % bolus infusion 1,000 mL  1,000 mL IntraVENous ONCE Hemanth Benitez  mL/hr at 05/06/21 0746 1,000 mL at 05/06/21 0746    potassium chloride 20 mEq in 100 ml IVPB  20 mEq IntraVENous ONCE Hemanth Benitez MD         Facility-Administered Medications Ordered in Other Encounters   Medication Dose Route Frequency Provider Last Rate Last Admin    central line flush (saline) syringe 10 mL  10 mL InterCATHeter Q8H Hemanth Benitez MD   20 mL at 05/05/21 0951       OBJECTIVE:  Visit Vitals  /67 (BP 1 Location: Right arm, BP Patient Position: Sitting)   Pulse (!) 108   Temp 97.8 °F (36.6 °C)   Resp 18   Wt 163 lb 3.2 oz (74 kg)   SpO2 96%   BMI 23.42 kg/m²       Physical Exam:  Constitutional: Well developed, well nourished male in no acute distress, sitting comfortably on the examination table. HEENT: Normocephalic and atraumatic. Oropharynx with some mild erythema. Mucous membranes are moist.  Sclerae anicteric. Neck supple. Skin Warm and dry. No bruising and no rash noted. No erythema. No pallor. Respiratory Lungs are clear to auscultation bilaterally without wheezes, rales or rhonchi, normal air exchange without accessory muscle use. CVS Mild tahcyardic rate, regular rhythm and normal S1 and S2. No murmurs, gallops, or rubs. Abdomen Soft, nontender and nondistended, normoactive bowel sounds. No palpable mass. Neuro Grossly nonfocal with no obvious sensory or motor deficits. MSK Normal range of motion in general.  No edema and no tenderness. Psych Appropriate mood and affect.       Labs:  Recent Results (from the past 24 hour(s))   CBC WITH AUTOMATED DIFF    Collection Time: 05/06/21  7:35 AM   Result Value Ref Range    WBC 6.7 4.3 - 11.1 K/uL    RBC 3.98 (L) 4.23 - 5.6 M/uL    HGB 10.3 (L) 13.6 - 17.2 g/dL    HCT 31.6 %    MCV 79.4 (L) 79.6 - 97.8 FL    MCH 25.9 (L) 26.1 - 32.9 PG    MCHC 32.6 31.4 - 35.0 g/dL    RDW 16.2 (H) 11.9 - 14.6 %    PLATELET 46 (L) 212 - 450 K/uL    MPV Unable to calculate. Recommend adding IPF. 9.4 - 12.3 FL    ABSOLUTE NRBC 0.00 0.0 - 0.2 K/uL    DF AUTOMATED      NEUTROPHILS 69 43 - 78 %    LYMPHOCYTES 11 (L) 13 - 44 %    MONOCYTES 13 (H) 4.0 - 12.0 %    EOSINOPHILS 0 (L) 0.5 - 7.8 %    BASOPHILS 1 0.0 - 2.0 %    IMMATURE GRANULOCYTES 6 (H) 0.0 - 5.0 %    ABS. NEUTROPHILS 4.6 1.7 - 8.2 K/UL    ABS. LYMPHOCYTES 0.7 0.5 - 4.6 K/UL    ABS. MONOCYTES 0.9 0.1 - 1.3 K/UL    ABS. EOSINOPHILS 0.0 0.0 - 0.8 K/UL    ABS. BASOPHILS 0.1 0.0 - 0.2 K/UL    ABS. IMM. GRANS. 0.4 0.0 - 0.5 K/UL    RBC COMMENTS MODERATE  ANISOCYTOSIS + POIKILOCYTOSIS        RBC COMMENTS OCCASIONAL  OVALOCYTES        RBC COMMENTS OCCASIONAL  SCHISTOCYTES        RBC COMMENTS OCCASIONAL  ACANTHOCYTES        WBC COMMENTS Result Confirmed By Smear      PLATELET COMMENTS MODERATE     MAGNESIUM    Collection Time: 05/06/21  7:35 AM   Result Value Ref Range    Magnesium 2.2 1.8 - 2.4 mg/dL   METABOLIC PANEL, BASIC    Collection Time: 05/06/21  7:35 AM   Result Value Ref Range    Sodium 142 136 - 145 mmol/L    Potassium 3.2 (L) 3.5 - 5.1 mmol/L    Chloride 112 (H) 98 - 107 mmol/L    CO2 21 21 - 32 mmol/L    Anion gap 9 7 - 16 mmol/L    Glucose 86 65 - 100 mg/dL    BUN 7 6 - 23 MG/DL    Creatinine 0.50 (L) 0.8 - 1.5 MG/DL    GFR est AA >60 >60 ml/min/1.73m2    GFR est non-AA >60 >60 ml/min/1.73m2    Calcium 7.4 (L) 8.3 - 10.4 MG/DL        Imaging:  No procedure found.     ASSESSMENT:    ICD-10-CM ICD-9-CM    1. Multiple myeloma not having achieved remission (HCC)  C90.00 203.00 VERIFY LABS      TREATMENT CONDITIONS      REGIMEN NOTES/COMMUNICATIONS      dextrose 5% - 0.45% NaCl with KCl 20 mEq/L infusion 1,000 mL      tbo-filgrastim (GRANIX) injection 300 mcg      VERIFY LABS      TREATMENT CONDITIONS      REGIMEN NOTES/COMMUNICATIONS           PLAN:  Lab studies were personally reviewed. MM: diagnosed 2020, completed 4 cycles of RVd with ME, now autoSCT with D0 on 4/23/21. Here for daily assessment in infusion, day +13 today. He reports having controlled nausea and has been taking Lomotil for loose stools, although not optimized on anti-diarrheals. C diff was negative. Appetite remains depressed, he is still eating bland foods/holding off on Ensure d/t diarrhea. There is some pain relief with MMW-one sore on left bottom gums. No cough, shortness of breath, or edema. Fatigue is present as expected. No fever, chills, or other infectious symptoms. Labs reviewed and ANC up to 4.6, will hold Granix. Will receive K for potassium of 3.2. They will  Phos packets RX from yesterday.               [facility]  [address] [place] [address]  Office : [phone]  Fax : [phone]

## 2021-05-06 NOTE — PROGRESS NOTES
Diagnosis: Multiple Myeloma  Transplant Date: 4/23/21  Day: (+/-) +13. Chemo regimen used: Melphalan  Labs needed at next visit: see orders. Labs not resulted that need follow-up: None. Next Appointment scheduled: 5/7/21. BMT assessments and toxicities completed. WBC/ANC= 6.7/4.6  Prophylactic medications started Day +1  Granix started on Day +6 (4/29) and last dose given on 5/5/21. Toxicities greater than 2 : Diarrhea  - grade 3. Bactrim or equivalent to be initiated on Day +30. Patient seen by MD/NP Dickson Modi NP until engraftment. New orders received: None. Issues: Patient has not picked up K Phos yet - will  today. Tachycardia - patient given extra liter NS. Potasium 3.2 - received 40 mEq KCl over 4 hours. ANC 4.6 - no Granix today. Discharged via wheelchair in stable condition.

## 2021-05-07 ENCOUNTER — HOSPITAL ENCOUNTER (OUTPATIENT)
Dept: INFUSION THERAPY | Age: 56
Discharge: HOME | End: 2021-05-07
Payer: MEDICARE

## 2021-05-07 ENCOUNTER — PATIENT OUTREACH (OUTPATIENT)
Dept: CASE MANAGEMENT | Age: 56
End: 2021-05-07

## 2021-05-07 VITALS
DIASTOLIC BLOOD PRESSURE: 68 MMHG | WEIGHT: 165.4 LBS | HEART RATE: 114 BPM | RESPIRATION RATE: 18 BRPM | OXYGEN SATURATION: 96 % | TEMPERATURE: 97.5 F | SYSTOLIC BLOOD PRESSURE: 99 MMHG | BODY MASS INDEX: 23.73 KG/M2

## 2021-05-07 DIAGNOSIS — C90.00 MULTIPLE MYELOMA NOT HAVING ACHIEVED REMISSION (HCC): Primary | ICD-10-CM

## 2021-05-07 LAB
ALBUMIN SERPL-MCNC: 2.7 G/DL (ref 3.5–5)
ALBUMIN/GLOB SERPL: 0.8 {RATIO} (ref 1.2–3.5)
ALP SERPL-CCNC: 78 U/L (ref 50–136)
ALT SERPL-CCNC: 17 U/L (ref 12–65)
ANION GAP SERPL CALC-SCNC: 8 MMOL/L (ref 7–16)
AST SERPL-CCNC: 7 U/L (ref 15–37)
BASOPHILS # BLD: 0.1 K/UL (ref 0–0.2)
BASOPHILS NFR BLD: 1 % (ref 0–2)
BILIRUB SERPL-MCNC: 0.2 MG/DL (ref 0.2–1.1)
BUN SERPL-MCNC: 4 MG/DL (ref 6–23)
CALCIUM SERPL-MCNC: 7 MG/DL (ref 8.3–10.4)
CHLORIDE SERPL-SCNC: 114 MMOL/L (ref 98–107)
CO2 SERPL-SCNC: 21 MMOL/L (ref 21–32)
CREAT SERPL-MCNC: 0.7 MG/DL (ref 0.8–1.5)
DIFFERENTIAL METHOD BLD: ABNORMAL
EOSINOPHIL # BLD: 0 K/UL (ref 0–0.8)
EOSINOPHIL NFR BLD: 0 % (ref 0.5–7.8)
ERYTHROCYTE [DISTWIDTH] IN BLOOD BY AUTOMATED COUNT: 16.5 % (ref 11.9–14.6)
GGT SERPL-CCNC: 77 U/L (ref 15–85)
GLOBULIN SER CALC-MCNC: 3.3 G/DL (ref 2.3–3.5)
GLUCOSE SERPL-MCNC: 85 MG/DL (ref 65–100)
HCT VFR BLD AUTO: 32.1 %
HGB BLD-MCNC: 10.3 G/DL (ref 13.6–17.2)
IMM GRANULOCYTES # BLD AUTO: 0.1 K/UL (ref 0–0.5)
IMM GRANULOCYTES NFR BLD AUTO: 2 % (ref 0–5)
LDH SERPL L TO P-CCNC: 168 U/L (ref 100–190)
LYMPHOCYTES # BLD: 1.2 K/UL (ref 0.5–4.6)
LYMPHOCYTES NFR BLD: 18 % (ref 13–44)
MAGNESIUM SERPL-MCNC: 2.1 MG/DL (ref 1.8–2.4)
MCH RBC QN AUTO: 25.8 PG (ref 26.1–32.9)
MCHC RBC AUTO-ENTMCNC: 32.1 G/DL (ref 31.4–35)
MCV RBC AUTO: 80.5 FL (ref 79.6–97.8)
MONOCYTES # BLD: 0.9 K/UL (ref 0.1–1.3)
MONOCYTES NFR BLD: 14 % (ref 4–12)
NEUTS SEG # BLD: 4.2 K/UL (ref 1.7–8.2)
NEUTS SEG NFR BLD: 65 % (ref 43–78)
NRBC # BLD: 0 K/UL (ref 0–0.2)
PHOSPHATE SERPL-MCNC: 1.2 MG/DL (ref 2.5–4.5)
PLATELET # BLD AUTO: 55 K/UL (ref 150–450)
PLATELET COMMENTS,PCOM: ABNORMAL
PMV BLD AUTO: 10 FL (ref 9.4–12.3)
POTASSIUM SERPL-SCNC: 3.2 MMOL/L (ref 3.5–5.1)
PROT SERPL-MCNC: 6 G/DL (ref 6.3–8.2)
RBC # BLD AUTO: 3.99 M/UL (ref 4.23–5.6)
RBC MORPH BLD: ABNORMAL
SODIUM SERPL-SCNC: 143 MMOL/L (ref 136–145)
WBC # BLD AUTO: 6.5 K/UL (ref 4.3–11.1)
WBC MORPH BLD: ABNORMAL

## 2021-05-07 PROCEDURE — 96361 HYDRATE IV INFUSION ADD-ON: CPT

## 2021-05-07 PROCEDURE — 74011000250 HC RX REV CODE- 250: Performed by: NURSE PRACTITIONER

## 2021-05-07 PROCEDURE — 82977 ASSAY OF GGT: CPT

## 2021-05-07 PROCEDURE — 96366 THER/PROPH/DIAG IV INF ADDON: CPT

## 2021-05-07 PROCEDURE — 96365 THER/PROPH/DIAG IV INF INIT: CPT

## 2021-05-07 PROCEDURE — 74011250636 HC RX REV CODE- 250/636: Performed by: INTERNAL MEDICINE

## 2021-05-07 PROCEDURE — 84100 ASSAY OF PHOSPHORUS: CPT

## 2021-05-07 PROCEDURE — 85025 COMPLETE CBC W/AUTO DIFF WBC: CPT

## 2021-05-07 PROCEDURE — 83615 LACTATE (LD) (LDH) ENZYME: CPT

## 2021-05-07 PROCEDURE — 74011250636 HC RX REV CODE- 250/636: Performed by: NURSE PRACTITIONER

## 2021-05-07 PROCEDURE — 74011000258 HC RX REV CODE- 258: Performed by: INTERNAL MEDICINE

## 2021-05-07 PROCEDURE — 96367 TX/PROPH/DG ADDL SEQ IV INF: CPT

## 2021-05-07 PROCEDURE — 99214 OFFICE O/P EST MOD 30 MIN: CPT | Performed by: NURSE PRACTITIONER

## 2021-05-07 PROCEDURE — 83735 ASSAY OF MAGNESIUM: CPT

## 2021-05-07 PROCEDURE — 80053 COMPREHEN METABOLIC PANEL: CPT

## 2021-05-07 RX ORDER — LOPERAMIDE HYDROCHLORIDE 2 MG/1
2 CAPSULE ORAL AS NEEDED
Status: ACTIVE | OUTPATIENT
Start: 2021-05-07 | End: 2021-05-07

## 2021-05-07 RX ORDER — SODIUM CHLORIDE 0.9 % (FLUSH) 0.9 %
10-40 SYRINGE (ML) INJECTION AS NEEDED
Status: ACTIVE | OUTPATIENT
Start: 2021-05-07 | End: 2021-05-07

## 2021-05-07 RX ORDER — DEXTROSE, SODIUM CHLORIDE, AND POTASSIUM CHLORIDE 5; .45; .15 G/100ML; G/100ML; G/100ML
1000 INJECTION INTRAVENOUS
Status: COMPLETED | OUTPATIENT
Start: 2021-05-07 | End: 2021-05-07

## 2021-05-07 RX ADMIN — SODIUM CHLORIDE 1000 ML: 900 INJECTION, SOLUTION INTRAVENOUS at 08:07

## 2021-05-07 RX ADMIN — Medication 10 ML: at 08:07

## 2021-05-07 RX ADMIN — POTASSIUM PHOSPHATE, MONOBASIC AND POTASSIUM PHOSPHATE, DIBASIC: 224; 236 INJECTION, SOLUTION, CONCENTRATE INTRAVENOUS at 09:30

## 2021-05-07 RX ADMIN — DEXTROSE, SODIUM CHLORIDE, AND POTASSIUM CHLORIDE 1000 ML: 5; .45; .15 INJECTION INTRAVENOUS at 08:08

## 2021-05-07 RX ADMIN — Medication 20 ML: at 15:20

## 2021-05-07 RX ADMIN — CALCIUM GLUCONATE 1 G: 98 INJECTION, SOLUTION INTRAVENOUS at 09:54

## 2021-05-07 NOTE — PROGRESS NOTES
[facility] Hematology and Oncology: Office Visit Established Patient    Chief Complaint:    No chief complaint on file. History of Present Illness:  Mr. Addy Gannon is a 48 y.o. male who returns today for management of multiple myeloma. In 9/2020 he was diagnosed with Multiple Myeloma, Kappa light chain, del.13q, t( 11;14 ), ISS Stage 3; at diagnosis his Kappa level was 5824, he received 4 cycles of RVd and achieved a VGPR, his Kappa level decreased to 285. He underwent an Autologous PBHCT on 4/23/2021. Here for daily assessment in infusion, day +14 today. He reports having controlled nausea and has been taking Lomotil for loose stools, working on taking more anti-diarrheals and working better now. C diff was negative. Appetite is slightly improved. There is some pain relief with MMW-one sore on left bottom gums, 50% improved. No cough, shortness of breath, or edema. Fatigue is present as expected. No fever, chills, or other infectious symptoms.            Review of Systems:  10 point review of systems is otherwise negative with the exception of the elements mentioned above in the HPI. No Known Allergies  Past Medical History:   Diagnosis Date    Cancer ([facility] [address] dx 1/2021    multiple myeloma----    GERD (gastroesophageal reflux disease)     controlled with med    Headache     CLUSTER PER G' FRIEND     Past Surgical History:   Procedure Laterality Date    HX KNEE REPLACEMENT Left 06/2000    HX KYPHOPLASTY  08/2020    HX OTHER SURGICAL      bone marrow biopsy    HX TRANSPLANT      Autologous Stem cell transplant    IR INSERT TUNL CVC W/O PORT OVER 5 YR  3/31/2021    IR US GUIDE NDL PLACE  3/29/2021     History reviewed. No pertinent family history.   Social History     Socioeconomic History    Marital status: SINGLE     Spouse name: Not on file    Number of children: Not on file    Years of education: Not on file    Highest education level: Not on file   Occupational History    Not on file Social Needs    Financial resource strain: Not on file    Food insecurity     Worry: Not on file     Inability: Not on file    Transportation needs     Medical: Not on file     Non-medical: Not on file   Tobacco Use    Smoking status: Former Smoker     Packs/day: 1.00     Years: 4.00     Pack years: 4.00     Quit date:      Years since quittin.3    Smokeless tobacco: Never Used   Substance and Sexual Activity    Alcohol use: Not Currently     Frequency: Never     Binge frequency: Never    Drug use: Not Currently    Sexual activity: Not on file   Lifestyle    Physical activity     Days per week: Not on file     Minutes per session: Not on file    Stress: Not on file   Relationships    Social connections     Talks on phone: Not on file     Gets together: Not on file     Attends Orthodoxy service: Not on file     Active member of club or organization: Not on file     Attends meetings of clubs or organizations: Not on file     Relationship status: Not on file    Intimate partner violence     Fear of current or ex partner: Not on file     Emotionally abused: Not on file     Physically abused: Not on file     Forced sexual activity: Not on file   Other Topics Concern     Service Not Asked    Blood Transfusions Not Asked    Caffeine Concern Not Asked    Occupational Exposure Not Asked   [employer] Hazards Not Asked    Sleep Concern Not Asked    Stress Concern Not Asked    Weight Concern Not Asked    Special Diet Not Asked    Back Care Not Asked    Exercise Not Asked    Bike Helmet Not Asked   [address] Not Asked    Self-Exams Not Asked   Social History Narrative    Not on file     Current Outpatient Medications   Medication Sig Dispense Refill    oxyCODONE-acetaminophen (PERCOCET 10)  mg per tablet Take 1 Tab by mouth every four (4) hours as needed for Pain for up to 30 days. Max Daily Amount: 6 Tabs.  180 Tab 0    potassium, sodium phosphates (Phos-NaK) 280-160-250 mg packet Take 1 Packet by mouth three (3) times daily for 3 days. Indications: low amount of phosphate in the blood 9 Packet 0    magic mouthwash (DAVID) susp Take 5 mL by mouth Before breakfast, lunch, and dinner. Magic Mouthwash  Maalox  Lidocaine 2% viscous  Diphenhydramine oral solution     Pharmacy to mix equal portions of ingredients to a total volume as indicated in the dispense amount 280 mL 3    loperamide (Imodium A-D) 2 mg tablet Take 1 Tab by mouth four (4) times daily as needed for Diarrhea for up to 15 days. 60 Tab 0    prochlorperazine (Compazine) 10 mg tablet Take 1 Tab by mouth every six (6) hours as needed for Nausea or Vomiting. 90 Tab 2    ondansetron hcl (ZOFRAN) 8 mg tablet Take 1 Tab by mouth every eight (8) hours as needed for Nausea or Vomiting. 90 Tab 1    acyclovir (ZOVIRAX) 400 mg tablet Take 1 Tab by mouth two (2) times a day. 60 Tab 5    gabapentin (NEURONTIN) 300 mg capsule Take 1 Cap by mouth three (3) times daily for 30 days. 90 Cap 3    cyclobenzaprine (FLEXERIL) 10 mg tablet Take 1 Tab by mouth as needed for Muscle Spasm(s). 30 Tab 1    zolpidem (AMBIEN) 5 mg tablet Take 1 Tab by mouth nightly as needed for Sleep. Max Daily Amount: 5 mg. 30 Tab 2    morphine CR (MS CONTIN) 60 mg CR tablet Take 1 Tab by mouth every twelve (12) hours for 30 days. Max Daily Amount: 120 mg. 60 Tab 0    vitamin k 100 mcg tablet Take 100 mcg by mouth daily.  cyanocobalamin (Vitamin B-12) 100 mcg tablet Take 100 mcg by mouth daily.  melatonin 3 mg tablet TAKE 1 TABLET BY MOUTH EVERY NIGHT AS NEEDED FOR SLEEP      potassium chloride (K-DUR, KLOR-CON) 20 mEq tablet Take 1 Tab by mouth two (2) times a day. (Patient taking differently: Take 20 mEq by mouth daily.) 60 Tab 0    cholecalciferol (VITAMIN D3) (1000 Units /25 mcg) tablet Take 1,000 Units by mouth daily.  LORazepam (ATIVAN) 0.5 mg tablet Take 0.5 mg by mouth daily.  montelukast (SINGULAIR) 10 mg tablet nightly.       pantoprazole (PROTONIX) 40 mg tablet daily.  aspirin (ASPIRIN) 325 mg tablet Take 1 Tab by mouth daily. Per patient instructed to hold for 7 days--pt stopped 3/20/21       Current Facility-Administered Medications   Medication Dose Route Frequency Provider Last Rate Last Admin    sodium chloride (NS) flush 10-40 mL  10-40 mL IntraVENous PRN Lilia Messina MD   10 mL at 05/07/21 0807    sodium chloride 0.9 % bolus infusion 1,000 mL  1,000 mL IntraVENous ONCE Lilia Messina  mL/hr at 05/07/21 0807 1,000 mL at 05/07/21 9846    loperamide (IMODIUM) capsule 2 mg  2 mg Oral PRN Lilia Messina MD         Facility-Administered Medications Ordered in Other Encounters   Medication Dose Route Frequency Provider Last Rate Last Admin    central line flush (saline) syringe 20-40 mL  20-40 mL InterCATHeter Q8H Lilia Messina MD   20 mL at 05/06/21 1227       OBJECTIVE:  Visit Vitals  BP 99/68 (BP 1 Location: Right arm, BP Patient Position: Sitting)   Pulse (!) 114   Temp 97.5 °F (36.4 °C)   Resp 18   Wt 165 lb 6.4 oz (75 kg)   SpO2 96%   BMI 23.73 kg/m²       Physical Exam:  Constitutional: Well developed, well nourished male in no acute distress, sitting comfortably on the examination table. HEENT: Normocephalic and atraumatic. Oropharynx with some mild erythema. Mucous membranes are moist.  Sclerae anicteric. Neck supple. Skin Warm and dry. No bruising and no rash noted. No erythema. No pallor. Respiratory Lungs are clear to auscultation bilaterally without wheezes, rales or rhonchi, normal air exchange without accessory muscle use. CVS Mild tahcyardic rate, regular rhythm and normal S1 and S2. No murmurs, gallops, or rubs. Abdomen Soft, nontender and nondistended, normoactive bowel sounds. No palpable mass. Neuro Grossly nonfocal with no obvious sensory or motor deficits. MSK Normal range of motion in general.  No edema and no tenderness. Psych Appropriate mood and affect. Labs:  Recent Results (from the past 24 hour(s))   CBC WITH AUTOMATED DIFF    Collection Time: 05/07/21  8:03 AM   Result Value Ref Range    WBC 6.5 4.3 - 11.1 K/uL    RBC 3.99 (L) 4.23 - 5.6 M/uL    HGB 10.3 (L) 13.6 - 17.2 g/dL    HCT 32.1 %    MCV 80.5 79.6 - 97.8 FL    MCH 25.8 (L) 26.1 - 32.9 PG    MCHC 32.1 31.4 - 35.0 g/dL    RDW 16.5 (H) 11.9 - 14.6 %    PLATELET 55 (L) 489 - 450 K/uL    MPV 10.0 9.4 - 12.3 FL    ABSOLUTE NRBC 0.00 0.0 - 0.2 K/uL    DF PENDING    METABOLIC PANEL, COMPREHENSIVE    Collection Time: 05/07/21  8:03 AM   Result Value Ref Range    Sodium 143 136 - 145 mmol/L    Potassium 3.2 (L) 3.5 - 5.1 mmol/L    Chloride 114 (H) 98 - 107 mmol/L    CO2 21 21 - 32 mmol/L    Anion gap 8 7 - 16 mmol/L    Glucose 85 65 - 100 mg/dL    BUN 4 (L) 6 - 23 MG/DL    Creatinine 0.70 (L) 0.8 - 1.5 MG/DL    GFR est AA >60 >60 ml/min/1.73m2    GFR est non-AA >60 >60 ml/min/1.73m2    Calcium 7.0 (L) 8.3 - 10.4 MG/DL    Bilirubin, total 0.2 0.2 - 1.1 MG/DL    ALT (SGPT) 17 12 - 65 U/L    AST (SGOT) 7 (L) 15 - 37 U/L    Alk. phosphatase 78 50 - 136 U/L    Protein, total 6.0 (L) 6.3 - 8.2 g/dL    Albumin 2.7 (L) 3.5 - 5.0 g/dL    Globulin 3.3 2.3 - 3.5 g/dL    A-G Ratio 0.8 (L) 1.2 - 3.5     LD    Collection Time: 05/07/21  8:03 AM   Result Value Ref Range     100 - 190 U/L   MAGNESIUM    Collection Time: 05/07/21  8:03 AM   Result Value Ref Range    Magnesium 2.1 1.8 - 2.4 mg/dL   GGT    Collection Time: 05/07/21  8:03 AM   Result Value Ref Range    GGT 77 15 - 85 U/L   PHOSPHORUS    Collection Time: 05/07/21  8:03 AM   Result Value Ref Range    Phosphorus 1.2 (L) 2.5 - 4.5 MG/DL        Imaging:  No procedure found.     ASSESSMENT:    ICD-10-CM ICD-9-CM    1. Multiple myeloma not having achieved remission ([facility])  C90.00 203.00 VERIFY LABS      TREATMENT CONDITIONS      REGIMEN NOTES/COMMUNICATIONS      dextrose 5% - 0.45% NaCl with KCl 20 mEq/L infusion 1,000 mL      VERIFY LABS      TREATMENT CONDITIONS REGIMEN NOTES/COMMUNICATIONS           PLAN:  Lab studies were personally reviewed. MM: diagnosed 2020, completed 4 cycles of RVd with [place], now autoSCT with D0 on 4/23/21. Here for daily assessment in infusion, Here for daily assessment in infusion, day +14 today. He reports having controlled nausea and has been taking Lomotil for loose stools, working on taking more anti-diarrheals and working better now. C diff was negative. Appetite is slightly improved. There is some pain relief with MMW-one sore on left bottom gums, 50% improved. No cough, shortness of breath, or edema. Fatigue is present as expected. No fever, chills, or other infectious symptoms. Labs reviewed and phos low at 1.2--will give 30 mmol of K phos today in infusion. Will have him return over the weekend d/t continued electrolyte abnormalities, and need for IVFs d/t hypotension/tachycardia.                     Latonia Gallegos NP  [facility]  [address] [place] [address]  Office : [phone]  Fax : [phone]

## 2021-05-07 NOTE — PROGRESS NOTES
Diagnosis: Multiple Myeloma  Transplant Date: 4/23/21  Day: (+/-) Day +14. Chemo regimen used: Melphalan  Labs needed at next visit: see orders. Labs not resulted that need follow-up: None. Next Appointment scheduled: 5/8/21. BMT assessments and toxicities completed. WBC/ANC= 6.5/4.2  Prophylactic medications started Day +1 and stopped 5/5/21. Granix started on Day +6 and last dose given on 5/5/21. Toxicities greater than 2 : None. Bactrim or equivalent to be initiated on Day +30. Patient seen by MD/NP Artemio Harding NP until engraftment. New orders received: Yes. Issues: Diarrhea & electrolyte imbalance  Patient arrived via wheelchair. Assessment completed and labs drawn & reviewed. Patient only had 2 diarrhea stools since he left yesterday and arrival this AM. However had 2 during 1st hour he was here and then several more. Taking Imodium after each (brought own) and knows only up to 8 per day. Potasium 3.2 - received 1 liter D5 0.45%NS with 20 mEq KCl over 2 hours. Received 1 liter NS. Received 1 gram Ca gluconate IV over 1 hour and 30 mm K Phos IV over 6 hours. Patient to  Calcium carbonate and take 1 tablet 3 times daily. Discharged via wheelchair in stable condition.

## 2021-05-07 NOTE — PROGRESS NOTES
SW received request from pt's wife to verify why remainder of [employer] rides have been cancelled. IMELDA contacted [facility] at [phone] and was told that on 5/5 after IMELDA's previous call to [facility] stating pt was on \"rider-scheduling restrictions\" due to no-shows that pt was then changed to \"mileage reimbursment only restrictions\" due to no shows. IMELDA was told pt must dispute rides and was forwarded to  and had to leave voicemail for [facility]" requesting call back regarding appeals. IMELDA updated pt and spouse, who confirmed the Starlyn Buerger was late several times, or did not show up and they had to take an Mila Mayotte to arrive on time for BMT appts. IMELDA stated SW will update upon receipt of return call from [facility] and they verbalized understanding.      SW intends to follow up and assist with transportation.     SW encouraged them to call SW should need arise. They verbalized understanding.

## 2021-05-07 NOTE — PROGRESS NOTES
5/7   D+14  Saw pt in infusion with NP. Pt is still having ongoing diarrhea but has improved. But over-all doing well. NP will refill his pain medicine today. Told pt depending on his labs he could probably skip these weekend appts and come Monday. Pt was OK with doing that. Will look at lab work from today with NP to determine if that is possible.

## 2021-05-08 ENCOUNTER — HOSPITAL ENCOUNTER (OUTPATIENT)
Dept: INFUSION THERAPY | Age: 56
Discharge: HOME | End: 2021-05-08
Payer: MEDICARE

## 2021-05-08 VITALS
BODY MASS INDEX: 23.76 KG/M2 | OXYGEN SATURATION: 94 % | SYSTOLIC BLOOD PRESSURE: 99 MMHG | HEART RATE: 100 BPM | WEIGHT: 165.6 LBS | TEMPERATURE: 98 F | RESPIRATION RATE: 18 BRPM | DIASTOLIC BLOOD PRESSURE: 64 MMHG

## 2021-05-08 DIAGNOSIS — Z94.81 S/P AUTOLOGOUS BONE MARROW TRANSPLANTATION (HCC): ICD-10-CM

## 2021-05-08 DIAGNOSIS — C90.00 MULTIPLE MYELOMA NOT HAVING ACHIEVED REMISSION (HCC): Primary | ICD-10-CM

## 2021-05-08 DIAGNOSIS — E55.9 VITAMIN D DEFICIENCY: ICD-10-CM

## 2021-05-08 LAB
ALBUMIN SERPL-MCNC: 2.7 G/DL (ref 3.5–5)
ALBUMIN/GLOB SERPL: 0.9 {RATIO} (ref 1.2–3.5)
ALP SERPL-CCNC: 76 U/L (ref 50–136)
ALT SERPL-CCNC: 20 U/L (ref 12–65)
ANION GAP SERPL CALC-SCNC: 8 MMOL/L (ref 7–16)
AST SERPL-CCNC: 12 U/L (ref 15–37)
BASOPHILS # BLD: 0.1 K/UL (ref 0–0.2)
BASOPHILS NFR BLD: 1 % (ref 0–2)
BILIRUB SERPL-MCNC: 0.2 MG/DL (ref 0.2–1.1)
BUN SERPL-MCNC: 2 MG/DL (ref 6–23)
CALCIUM SERPL-MCNC: 7.3 MG/DL (ref 8.3–10.4)
CHLORIDE SERPL-SCNC: 113 MMOL/L (ref 98–107)
CO2 SERPL-SCNC: 21 MMOL/L (ref 21–32)
CREAT SERPL-MCNC: 0.6 MG/DL (ref 0.8–1.5)
DIFFERENTIAL METHOD BLD: ABNORMAL
EOSINOPHIL # BLD: 0 K/UL (ref 0–0.8)
EOSINOPHIL NFR BLD: 0 % (ref 0.5–7.8)
ERYTHROCYTE [DISTWIDTH] IN BLOOD BY AUTOMATED COUNT: 16.8 % (ref 11.9–14.6)
GLOBULIN SER CALC-MCNC: 3.1 G/DL (ref 2.3–3.5)
GLUCOSE SERPL-MCNC: 84 MG/DL (ref 65–100)
HCT VFR BLD AUTO: 31.7 %
HGB BLD-MCNC: 10.1 G/DL (ref 13.6–17.2)
IMM GRANULOCYTES # BLD AUTO: 0.1 K/UL (ref 0–0.5)
IMM GRANULOCYTES NFR BLD AUTO: 1 % (ref 0–5)
LYMPHOCYTES # BLD: 1.5 K/UL (ref 0.5–4.6)
LYMPHOCYTES NFR BLD: 29 % (ref 13–44)
MCH RBC QN AUTO: 25.8 PG (ref 26.1–32.9)
MCHC RBC AUTO-ENTMCNC: 31.9 G/DL (ref 31.4–35)
MCV RBC AUTO: 80.9 FL (ref 79.6–97.8)
MONOCYTES # BLD: 0.9 K/UL (ref 0.1–1.3)
MONOCYTES NFR BLD: 18 % (ref 4–12)
NEUTS SEG # BLD: 2.5 K/UL (ref 1.7–8.2)
NEUTS SEG NFR BLD: 51 % (ref 43–78)
NRBC # BLD: 0 K/UL (ref 0–0.2)
PHOSPHATE SERPL-MCNC: 1.6 MG/DL (ref 2.5–4.5)
PLATELET # BLD AUTO: 70 K/UL (ref 150–450)
PLATELET COMMENTS,PCOM: ABNORMAL
PMV BLD AUTO: 10.4 FL (ref 9.4–12.3)
POTASSIUM SERPL-SCNC: 3.4 MMOL/L (ref 3.5–5.1)
PROT SERPL-MCNC: 5.8 G/DL (ref 6.3–8.2)
RBC # BLD AUTO: 3.92 M/UL (ref 4.23–5.6)
RBC MORPH BLD: ABNORMAL
SODIUM SERPL-SCNC: 142 MMOL/L (ref 136–145)
WBC # BLD AUTO: 5.1 K/UL (ref 4.3–11.1)
WBC MORPH BLD: ABNORMAL

## 2021-05-08 PROCEDURE — 74011000250 HC RX REV CODE- 250: Performed by: INTERNAL MEDICINE

## 2021-05-08 PROCEDURE — 96365 THER/PROPH/DIAG IV INF INIT: CPT

## 2021-05-08 PROCEDURE — 84100 ASSAY OF PHOSPHORUS: CPT

## 2021-05-08 PROCEDURE — 96368 THER/DIAG CONCURRENT INF: CPT

## 2021-05-08 PROCEDURE — 74011250636 HC RX REV CODE- 250/636: Performed by: INTERNAL MEDICINE

## 2021-05-08 PROCEDURE — 80053 COMPREHEN METABOLIC PANEL: CPT

## 2021-05-08 PROCEDURE — 96361 HYDRATE IV INFUSION ADD-ON: CPT

## 2021-05-08 PROCEDURE — 74011000258 HC RX REV CODE- 258: Performed by: INTERNAL MEDICINE

## 2021-05-08 PROCEDURE — 99214 OFFICE O/P EST MOD 30 MIN: CPT | Performed by: INTERNAL MEDICINE

## 2021-05-08 PROCEDURE — 96366 THER/PROPH/DIAG IV INF ADDON: CPT

## 2021-05-08 PROCEDURE — 85025 COMPLETE CBC W/AUTO DIFF WBC: CPT

## 2021-05-08 RX ORDER — SODIUM CHLORIDE 0.9 % (FLUSH) 0.9 %
10-40 SYRINGE (ML) INJECTION AS NEEDED
Status: ACTIVE | OUTPATIENT
Start: 2021-05-08 | End: 2021-05-08

## 2021-05-08 RX ORDER — DEXTROSE, SODIUM CHLORIDE, AND POTASSIUM CHLORIDE 5; .45; .15 G/100ML; G/100ML; G/100ML
1000 INJECTION INTRAVENOUS
Status: COMPLETED | OUTPATIENT
Start: 2021-05-08 | End: 2021-05-08

## 2021-05-08 RX ADMIN — Medication 10 ML: at 11:55

## 2021-05-08 RX ADMIN — DEXTROSE, SODIUM CHLORIDE, AND POTASSIUM CHLORIDE 1000 ML: 5; .45; .15 INJECTION INTRAVENOUS at 08:57

## 2021-05-08 RX ADMIN — SODIUM CHLORIDE 1000 ML: 900 INJECTION, SOLUTION INTRAVENOUS at 08:49

## 2021-05-08 RX ADMIN — Medication 20 ML: at 16:00

## 2021-05-08 RX ADMIN — POTASSIUM PHOSPHATE, MONOBASIC AND POTASSIUM PHOSPHATE, DIBASIC: 224; 236 INJECTION, SOLUTION, CONCENTRATE INTRAVENOUS at 09:25

## 2021-05-08 RX ADMIN — CALCIUM GLUCONATE 1 G: 98 INJECTION, SOLUTION INTRAVENOUS at 11:01

## 2021-05-08 RX ADMIN — Medication 10 ML: at 08:49

## 2021-05-08 NOTE — PROGRESS NOTES
Doroteo Correa  [address] [place] [address]  Phone: [phone]           5/8/2021  Zoë Bai  1965  [id number]         aDja Wagner a 48year old -American man who has returned to my clinic for a follow-up visit; he was initially referred to [facility] Dr. Dean Johnston. In 9/2020 he was diagnosed with Multiple Myeloma, Kappa light chain, del.13q, t( 11;14 ), ISS Stage 3; at diagnosis his Kappa level was 5824, he received 4 cycles of RVd and achieved a VGPR, his Kappa level decreased to 285. He underwent an Autologous PBHCT on 4/23/2021.        ALLERGIES:    No known drug allergies.        FAMILY HISTORY:    No hematologic disorders.        SOCIAL HISTORY:    He is  and lives with his wife. He used to work in a [address] [place] [address]. He stopped smoking cigarettes in 6/2019.        PAST MEDICAL HISTORY:     Osteoarthritis, renal insufficiency, Interstitial Lung Disease, Vitamin D deficiency and Multiple Myeloma.        ROS:  The patient complained of fatigue and parasthesia; all other systems negative.        PHYSICAL EXAM:   The patient was alert, awake and oriented, no acute distress was noted. Oral examination did not reveal any mucosal lesions. Lymph node examination did not reveal any adenopathy. Neck examination revealed a supple neck, no thyromegaly or masses were noted. Chest examination revealed normal vesicular breath sounds. Heart examination revealed S-1 and S-2 without any murmurs. Abdominal examination revealed a non-tender abdomen, bowel sounds were positive, no organomegaly could be appreciated. Examination of the extremities did not reveal any tenderness or erythema. Examination of the skin did not reveal any lesions.   Medical problems and test results were reviewed with the patient today.        KPS:    70.         LABORATORY INVESTIGATIONS:  CBC showed a WBC count of 5.1, ANC was 2.5, Hemoglobin was 10.1 and Platelets RJTR 43.        ASSESSMENT:    Multiple Myeloma, Kappa light chain; s/p  Autologous PBHCT Day +15; Vitamin D deficiency.        PLAN:      He should continue taking supplemental Vitamin D and prophylactic Acyclovir as per protocol; today we will administer IV anti-emetics, IV fluids and correct his electrolytes.        Andrews Thompson MD  Hematology/BMT

## 2021-05-08 NOTE — PROGRESS NOTES
Diagnosis: Multiple Myeloma  Transplant Date: 4/23/21  Day: (+/-) +15. Chemo regimen used: Melphalan  Labs needed at next visit: see orders. Labs not resulted that need follow-up: None. Next Appointment scheduled: 5/9/21. BMT assessments and toxicities completed. WBC/ANC= 5.1/2.5  Prophylactic medications started Day +1 and stopped 5/5/21. Granix started on Day +6 and last dose given on 5/5/21. Toxicities greater than 2 : None. Bactrim or equivalent to be initiated on Day +30. Patient seen by MD/NP Dr. Kevin Negrete until engraftment. New orders received: Yes. Issues:  Diarrhea & electrolyte imbalance  Patient arrived via wheelchair. Labs drawn & reviewed and seen by Dr Kevin Negrete. Only 2 diarrhea stool since D/C yesterday. Potassium 3.4 - received 20 mEq KCl in 1000 mL D5 0.45% NS over 2 hours. Corrected Calcium 8.34 - received 1 gram Ca Gluconate over 1 hour, and 30 mmol K Phos over 5 hours for phosphorous level 1.6. Taking Imodium as directed for diarrhea. Discharged via wheelchair in stable condition.

## 2021-05-09 ENCOUNTER — HOSPITAL ENCOUNTER (OUTPATIENT)
Dept: INFUSION THERAPY | Age: 56
Discharge: HOME | End: 2021-05-09
Payer: MEDICARE

## 2021-05-09 VITALS
TEMPERATURE: 97.9 F | DIASTOLIC BLOOD PRESSURE: 70 MMHG | HEART RATE: 104 BPM | BODY MASS INDEX: 24.19 KG/M2 | OXYGEN SATURATION: 96 % | WEIGHT: 168.6 LBS | SYSTOLIC BLOOD PRESSURE: 96 MMHG | RESPIRATION RATE: 18 BRPM

## 2021-05-09 DIAGNOSIS — E55.9 VITAMIN D DEFICIENCY: ICD-10-CM

## 2021-05-09 DIAGNOSIS — Z94.81 S/P AUTOLOGOUS BONE MARROW TRANSPLANTATION (HCC): ICD-10-CM

## 2021-05-09 DIAGNOSIS — C90.00 MULTIPLE MYELOMA NOT HAVING ACHIEVED REMISSION (HCC): Primary | ICD-10-CM

## 2021-05-09 LAB
ALBUMIN SERPL-MCNC: 2.6 G/DL (ref 3.5–5)
ALBUMIN/GLOB SERPL: 0.9 {RATIO} (ref 1.2–3.5)
ALP SERPL-CCNC: 76 U/L (ref 50–136)
ALT SERPL-CCNC: 24 U/L (ref 12–65)
ANION GAP SERPL CALC-SCNC: 8 MMOL/L (ref 7–16)
AST SERPL-CCNC: 16 U/L (ref 15–37)
BASOPHILS # BLD: 0 K/UL (ref 0–0.2)
BASOPHILS NFR BLD: 0 % (ref 0–2)
BILIRUB SERPL-MCNC: 0.2 MG/DL (ref 0.2–1.1)
BUN SERPL-MCNC: 2 MG/DL (ref 6–23)
CALCIUM SERPL-MCNC: 7.1 MG/DL (ref 8.3–10.4)
CHLORIDE SERPL-SCNC: 112 MMOL/L (ref 98–107)
CO2 SERPL-SCNC: 22 MMOL/L (ref 21–32)
CREAT SERPL-MCNC: 0.5 MG/DL (ref 0.8–1.5)
DIFFERENTIAL METHOD BLD: ABNORMAL
EOSINOPHIL # BLD: 0 K/UL (ref 0–0.8)
EOSINOPHIL NFR BLD: 0 % (ref 0.5–7.8)
ERYTHROCYTE [DISTWIDTH] IN BLOOD BY AUTOMATED COUNT: 17 % (ref 11.9–14.6)
GLOBULIN SER CALC-MCNC: 2.9 G/DL (ref 2.3–3.5)
GLUCOSE SERPL-MCNC: 82 MG/DL (ref 65–100)
HCT VFR BLD AUTO: 31.2 %
HGB BLD-MCNC: 9.9 G/DL (ref 13.6–17.2)
IMM GRANULOCYTES # BLD AUTO: 0.1 K/UL (ref 0–0.5)
IMM GRANULOCYTES NFR BLD AUTO: 2 % (ref 0–5)
LYMPHOCYTES # BLD: 1.4 K/UL (ref 0.5–4.6)
LYMPHOCYTES NFR BLD: 26 % (ref 13–44)
MCH RBC QN AUTO: 25.6 PG (ref 26.1–32.9)
MCHC RBC AUTO-ENTMCNC: 31.7 G/DL (ref 31.4–35)
MCV RBC AUTO: 80.8 FL (ref 79.6–97.8)
MONOCYTES # BLD: 1.1 K/UL (ref 0.1–1.3)
MONOCYTES NFR BLD: 20 % (ref 4–12)
NEUTS SEG # BLD: 2.9 K/UL (ref 1.7–8.2)
NEUTS SEG NFR BLD: 52 % (ref 43–78)
NRBC # BLD: 0 K/UL (ref 0–0.2)
PHOSPHATE SERPL-MCNC: 1.2 MG/DL (ref 2.5–4.5)
PLATELET # BLD AUTO: 86 K/UL (ref 150–450)
PLATELET COMMENTS,PCOM: ABNORMAL
PMV BLD AUTO: 10.3 FL (ref 9.4–12.3)
POTASSIUM SERPL-SCNC: 3.5 MMOL/L (ref 3.5–5.1)
PROT SERPL-MCNC: 5.5 G/DL (ref 6.3–8.2)
RBC # BLD AUTO: 3.86 M/UL (ref 4.23–5.6)
RBC MORPH BLD: ABNORMAL
SODIUM SERPL-SCNC: 142 MMOL/L (ref 136–145)
WBC # BLD AUTO: 5.5 K/UL (ref 4.3–11.1)
WBC MORPH BLD: ABNORMAL

## 2021-05-09 PROCEDURE — 96366 THER/PROPH/DIAG IV INF ADDON: CPT

## 2021-05-09 PROCEDURE — 96365 THER/PROPH/DIAG IV INF INIT: CPT

## 2021-05-09 PROCEDURE — 84100 ASSAY OF PHOSPHORUS: CPT

## 2021-05-09 PROCEDURE — 85025 COMPLETE CBC W/AUTO DIFF WBC: CPT

## 2021-05-09 PROCEDURE — 96367 TX/PROPH/DG ADDL SEQ IV INF: CPT

## 2021-05-09 PROCEDURE — 74011250636 HC RX REV CODE- 250/636: Performed by: INTERNAL MEDICINE

## 2021-05-09 PROCEDURE — 96361 HYDRATE IV INFUSION ADD-ON: CPT

## 2021-05-09 PROCEDURE — 74011000250 HC RX REV CODE- 250: Performed by: INTERNAL MEDICINE

## 2021-05-09 PROCEDURE — 99214 OFFICE O/P EST MOD 30 MIN: CPT | Performed by: INTERNAL MEDICINE

## 2021-05-09 PROCEDURE — 80053 COMPREHEN METABOLIC PANEL: CPT

## 2021-05-09 PROCEDURE — 74011000258 HC RX REV CODE- 258: Performed by: INTERNAL MEDICINE

## 2021-05-09 RX ORDER — SODIUM CHLORIDE 0.9 % (FLUSH) 0.9 %
10-40 SYRINGE (ML) INJECTION AS NEEDED
Status: DISCONTINUED | OUTPATIENT
Start: 2021-05-09 | End: 2021-05-11 | Stop reason: HOSPADM

## 2021-05-09 RX ORDER — CALCIUM GLUCONATE 20 MG/ML
1 INJECTION, SOLUTION INTRAVENOUS ONCE
Status: DISCONTINUED | OUTPATIENT
Start: 2021-05-09 | End: 2021-05-09

## 2021-05-09 RX ORDER — DEXTROSE, SODIUM CHLORIDE, AND POTASSIUM CHLORIDE 5; .45; .15 G/100ML; G/100ML; G/100ML
1000 INJECTION INTRAVENOUS
Status: COMPLETED | OUTPATIENT
Start: 2021-05-09 | End: 2021-05-09

## 2021-05-09 RX ORDER — CALCIUM GLUCONATE 20 MG/ML
1 INJECTION, SOLUTION INTRAVENOUS ONCE
Status: CANCELLED | OUTPATIENT
Start: 2021-05-10 | End: 2021-05-10

## 2021-05-09 RX ADMIN — SODIUM CHLORIDE 1000 ML: 900 INJECTION, SOLUTION INTRAVENOUS at 07:47

## 2021-05-09 RX ADMIN — Medication 20 ML: at 14:53

## 2021-05-09 RX ADMIN — POTASSIUM PHOSPHATE, MONOBASIC AND POTASSIUM PHOSPHATE, DIBASIC: 224; 236 INJECTION, SOLUTION, CONCENTRATE INTRAVENOUS at 10:23

## 2021-05-09 RX ADMIN — DEXTROSE, SODIUM CHLORIDE, AND POTASSIUM CHLORIDE 1000 ML: 5; .45; .15 INJECTION INTRAVENOUS at 08:23

## 2021-05-09 RX ADMIN — Medication 10 ML: at 07:46

## 2021-05-09 RX ADMIN — Medication 10 ML: at 10:23

## 2021-05-09 RX ADMIN — CALCIUM GLUCONATE 1 G: 98 INJECTION, SOLUTION INTRAVENOUS at 09:14

## 2021-05-09 NOTE — PROGRESS NOTES
Doroteo Correa  [address] [place] [address], [address] [place] [address]  Phone: [phone]           5/9/2021  Rose Laws  1965  [id number]         Ousmane Cervantes a 48year old -American man who has returned to my clinic for a follow-up visit; he was initially referred to [facility] Dr. Wilfredo Lozano. In 9/2020 he was diagnosed with Multiple Myeloma, Kappa light chain, del.13q, t( 11;14 ), ISS Stage 3; at diagnosis his Kappa level was 5824, he received 4 cycles of RVd and achieved a VGPR, his Kappa level decreased to 285. He underwent an Autologous PBHCT on 4/23/2021.        ALLERGIES:    No known drug allergies.        FAMILY HISTORY:    No hematologic disorders.        SOCIAL HISTORY:    He is  and lives with his wife. He used to work in a [address] [place] [address]. He stopped smoking cigarettes in 6/2019.        PAST MEDICAL HISTORY:     Osteoarthritis, renal insufficiency, Interstitial Lung Disease, Vitamin D deficiency and Multiple Myeloma.        ROS:  The patient complained of fatigue and parasthesia; all other systems negative.        PHYSICAL EXAM:   The patient was alert, awake and oriented, no acute distress was noted. Oral examination did not reveal any mucosal lesions. Lymph node examination did not reveal any adenopathy. Neck examination revealed a supple neck, no thyromegaly or masses were noted. Chest examination revealed normal vesicular breath sounds. Heart examination revealed S-1 and S-2 without any murmurs. Abdominal examination revealed a non-tender abdomen, bowel sounds were positive, no organomegaly could be appreciated. Examination of the extremities did not reveal any tenderness or erythema. Examination of the skin did not reveal any lesions.   Medical problems and test results were reviewed with the patient today.        KPS:    70.         LABORATORY INVESTIGATIONS:  CBC showed a WBC count of 5.5, ANC was 2.9, Hemoglobin was 9.9 and Platelets LHYZ 43.        ASSESSMENT:    Multiple Myeloma, Kappa light chain; s/p  Autologous PBHCT Day +16; Vitamin D deficiency.        PLAN:      He should continue taking supplemental Vitamin D and prophylactic Acyclovir as per protocol; today we will administer IV anti-emetics, IV fluids and correct his electrolytes.        Sivan Urrutia MD  Hematology/BMT

## 2021-05-09 NOTE — PROGRESS NOTES
Diagnosis: Multiple Myeloma  Transplant Date: 4/23/21  Day: (+/-) +16. Chemo regimen used: Melphalan  Labs needed at next visit: see orders. Labs not resulted that need follow-up: None. Next Appointment scheduled: 5/10/21. BMT assessments and toxicities completed. WBC/ANC= 5.5/2.9  Prophylactic medications started Day +1 and stopped 5/5/21. Granix started on Day +6 and last dose given on 5/5/21. Toxicities greater than 2 : None. Bactrim or equivalent to be initiated on Day +30. Patient was seen by Dr. Keyur Pagan today. Pt to be seen by MD/NP until engraftment. New orders received: Yes. Issues:  Continued diarrhea & electrolyte imbalance  Patient arrived via wheelchair. Labs drawn & reviewed and seen by Dr Keyur Pagan. Pt reports againg only have two diarrhea stool episodes since D/C yesterday. Potassium 3.5 - received 20 mEq KCl in 1000 mL D5 0.45% NS over 2 hours. Corrected Calcium 8.22 mg/dl - received 1 gram Ca Gluconate over 1 hour. Phosphorous level 1.2 - 30 mmol K Phos over 4.5 hours. Pt states he is still taking Imodium as directed for diarrhea. Per Dr. Keyur Pagan, due to his continued electrolyte imbalance, pt to receive all electrolytes replacements received today, tomorrow. Orders placed under signed and held. Pt discharged via wheelchair in stable condition.

## 2021-05-10 ENCOUNTER — HOSPITAL ENCOUNTER (OUTPATIENT)
Dept: INFUSION THERAPY | Age: 56
Discharge: HOME | End: 2021-05-10
Payer: MEDICARE

## 2021-05-10 VITALS
TEMPERATURE: 98.2 F | SYSTOLIC BLOOD PRESSURE: 110 MMHG | RESPIRATION RATE: 18 BRPM | OXYGEN SATURATION: 97 % | DIASTOLIC BLOOD PRESSURE: 69 MMHG | HEART RATE: 117 BPM | BODY MASS INDEX: 23.82 KG/M2 | WEIGHT: 166 LBS

## 2021-05-10 LAB
ALBUMIN SERPL-MCNC: 2.8 G/DL (ref 3.5–5)
ALBUMIN/GLOB SERPL: 0.9 {RATIO} (ref 1.2–3.5)
ALP SERPL-CCNC: 88 U/L (ref 50–136)
ALT SERPL-CCNC: 27 U/L (ref 12–65)
ANION GAP SERPL CALC-SCNC: 8 MMOL/L (ref 7–16)
AST SERPL-CCNC: 20 U/L (ref 15–37)
BASOPHILS # BLD: 0 K/UL (ref 0–0.2)
BASOPHILS NFR BLD: 0 % (ref 0–2)
BILIRUB SERPL-MCNC: 0.2 MG/DL (ref 0.2–1.1)
BUN SERPL-MCNC: 2 MG/DL (ref 6–23)
CALCIUM SERPL-MCNC: 7.8 MG/DL (ref 8.3–10.4)
CHLORIDE SERPL-SCNC: 110 MMOL/L (ref 98–107)
CO2 SERPL-SCNC: 22 MMOL/L (ref 21–32)
CREAT SERPL-MCNC: 0.6 MG/DL (ref 0.8–1.5)
DIFFERENTIAL METHOD BLD: ABNORMAL
EOSINOPHIL # BLD: 0 K/UL (ref 0–0.8)
EOSINOPHIL NFR BLD: 0 % (ref 0.5–7.8)
ERYTHROCYTE [DISTWIDTH] IN BLOOD BY AUTOMATED COUNT: 17.1 % (ref 11.9–14.6)
GGT SERPL-CCNC: 75 U/L (ref 15–85)
GLOBULIN SER CALC-MCNC: 3.1 G/DL (ref 2.3–3.5)
GLUCOSE SERPL-MCNC: 124 MG/DL (ref 65–100)
HCT VFR BLD AUTO: 33 %
HGB BLD-MCNC: 10.5 G/DL (ref 13.6–17.2)
IMM GRANULOCYTES # BLD AUTO: 0.1 K/UL (ref 0–0.5)
IMM GRANULOCYTES NFR BLD AUTO: 1 % (ref 0–5)
LDH SERPL L TO P-CCNC: 227 U/L (ref 100–190)
LYMPHOCYTES # BLD: 1.7 K/UL (ref 0.5–4.6)
LYMPHOCYTES NFR BLD: 33 % (ref 13–44)
MAGNESIUM SERPL-MCNC: 1.5 MG/DL (ref 1.8–2.4)
MCH RBC QN AUTO: 25.8 PG (ref 26.1–32.9)
MCHC RBC AUTO-ENTMCNC: 31.8 G/DL (ref 31.4–35)
MCV RBC AUTO: 81.1 FL (ref 79.6–97.8)
MONOCYTES # BLD: 0.5 K/UL (ref 0.1–1.3)
MONOCYTES NFR BLD: 11 % (ref 4–12)
NEUTS SEG # BLD: 2.9 K/UL (ref 1.7–8.2)
NEUTS SEG NFR BLD: 56 % (ref 43–78)
NRBC # BLD: 0 K/UL (ref 0–0.2)
PHOSPHATE SERPL-MCNC: 1.9 MG/DL (ref 2.5–4.5)
PLATELET # BLD AUTO: 116 K/UL (ref 150–450)
PMV BLD AUTO: 10.4 FL (ref 9.4–12.3)
POTASSIUM SERPL-SCNC: 4.4 MMOL/L (ref 3.5–5.1)
PROT SERPL-MCNC: 5.9 G/DL (ref 6.3–8.2)
RBC # BLD AUTO: 4.07 M/UL (ref 4.23–5.6)
SODIUM SERPL-SCNC: 140 MMOL/L (ref 136–145)
WBC # BLD AUTO: 5.1 K/UL (ref 4.3–11.1)

## 2021-05-10 PROCEDURE — 74011000250 HC RX REV CODE- 250: Performed by: INTERNAL MEDICINE

## 2021-05-10 PROCEDURE — 74011250636 HC RX REV CODE- 250/636: Performed by: INTERNAL MEDICINE

## 2021-05-10 PROCEDURE — 96366 THER/PROPH/DIAG IV INF ADDON: CPT

## 2021-05-10 PROCEDURE — 82977 ASSAY OF GGT: CPT

## 2021-05-10 PROCEDURE — 85025 COMPLETE CBC W/AUTO DIFF WBC: CPT

## 2021-05-10 PROCEDURE — 96368 THER/DIAG CONCURRENT INF: CPT

## 2021-05-10 PROCEDURE — 74011000258 HC RX REV CODE- 258: Performed by: INTERNAL MEDICINE

## 2021-05-10 PROCEDURE — 83735 ASSAY OF MAGNESIUM: CPT

## 2021-05-10 PROCEDURE — 96367 TX/PROPH/DG ADDL SEQ IV INF: CPT

## 2021-05-10 PROCEDURE — 96365 THER/PROPH/DIAG IV INF INIT: CPT

## 2021-05-10 PROCEDURE — 80053 COMPREHEN METABOLIC PANEL: CPT

## 2021-05-10 PROCEDURE — 84100 ASSAY OF PHOSPHORUS: CPT

## 2021-05-10 PROCEDURE — 83615 LACTATE (LD) (LDH) ENZYME: CPT

## 2021-05-10 RX ORDER — MAGNESIUM SULFATE HEPTAHYDRATE 40 MG/ML
2 INJECTION, SOLUTION INTRAVENOUS ONCE
Status: COMPLETED | OUTPATIENT
Start: 2021-05-10 | End: 2021-05-10

## 2021-05-10 RX ORDER — SODIUM CHLORIDE 0.9 % (FLUSH) 0.9 %
10-40 SYRINGE (ML) INJECTION AS NEEDED
Status: ACTIVE | OUTPATIENT
Start: 2021-05-10 | End: 2021-05-10

## 2021-05-10 RX ORDER — CALCIUM GLUCONATE 20 MG/ML
1 INJECTION, SOLUTION INTRAVENOUS ONCE
Status: DISCONTINUED | OUTPATIENT
Start: 2021-05-10 | End: 2021-05-10

## 2021-05-10 RX ORDER — SODIUM CHLORIDE 0.9 % (FLUSH) 0.9 %
20-40 SYRINGE (ML) INJECTION EVERY 8 HOURS
Status: CANCELLED | OUTPATIENT
Start: 2021-05-10

## 2021-05-10 RX ADMIN — Medication 20 ML: at 13:51

## 2021-05-10 RX ADMIN — MAGNESIUM SULFATE HEPTAHYDRATE 2 G: 40 INJECTION, SOLUTION INTRAVENOUS at 09:14

## 2021-05-10 RX ADMIN — CALCIUM GLUCONATE 1 G: 98 INJECTION, SOLUTION INTRAVENOUS at 12:41

## 2021-05-10 RX ADMIN — SODIUM CHLORIDE 500 ML: 900 INJECTION, SOLUTION INTRAVENOUS at 08:00

## 2021-05-10 RX ADMIN — POTASSIUM PHOSPHATE, MONOBASIC AND POTASSIUM PHOSPHATE, DIBASIC: 224; 236 INJECTION, SOLUTION, CONCENTRATE INTRAVENOUS at 08:08

## 2021-05-10 NOTE — PROGRESS NOTES
Clinical Social Work Note    Date of Service: 5/10/2021    Length of Service: 30 minutes    Patient Diagnosis: multiple myeloma    Reason for Visit: follow up    Clinical Note: SW received notification from pt's wife stating Luci Fox cancelled all pt's schedule rides as he is now on [employer] reimbursement restrictions\" as Luci Fox stated pt was no-showed too many rides, though per pt, pt cancelled rides as Luci Reil was late and pt needed to get to appts. SW left message for [employer], in  for Luci Fox about appeals process. SW updated pt and wife who stated understanding. SW noted to pt that his [employer] likely has limited transportation benefit also and offered to schedule rides with them. They verbalized understanding and stated pt will likely stay in [facility] apartment for another week due to medical necessity and will not need rides till next week. SW also provided meal vouchers from [employer] due to pt need. Next Steps: SW provided pt with SW contact information and encouraged pt to call should any needs arise. Pt verbalized understanding.  SW intends to follow up.    3 most recent [facility] [place] Screens 4/21/2021 3/25/2021 3/12/2021   Little interest or pleasure in doing things Not at all Not at all Not at all   Feeling down, depressed, irritable, or hopeless Not at all Not at all Not at all   Total Score PHQ 2 0 0 0         Electronically Signed By: Colletta Printers, LMSW

## 2021-05-10 NOTE — PROGRESS NOTES
Diagnosis: Multiple Myeloma  Transplant Date: 4/23/21  Day: (+/-) +17. Chemo regimen used: Melphalan  Labs needed at next visit: see orders. Labs not resulted that need follow-up: None. Next Appointment scheduled: 5/12/21 @ 0900. BMT assessments and toxicities completed. WBC/ANC= 5.1/2.9  Prophylactic medications started Day +1 and stopped 5/5/21. Granix started on Day +6 and last dose given on 5/5/21. Toxicities greater than 2 : None. Bactrim or equivalent to be initiated on Day +30.    Patient was seen by Dr. Tray Perkins. Pt to be seen by MD/NP until engraftment. New orders received: Yes. Issues:  Continued electrolyte imbalance  Patient arrived via wheelchair.  Labs drawn & reviewed and seen by Dr Dyllan Light.  Pt reports  having no diarrhea since D/C yesterday.  today pt received 500cns, 1 gram Ca Gluconate over 1hr, 30 mmol K Phos over 4.5 hours as ordered yesterday. Pt discharged via wheelchair in stable condition.

## 2021-05-11 ENCOUNTER — DOCUMENTATION ONLY (OUTPATIENT)
Dept: ONCOLOGY | Age: 56
End: 2021-05-11

## 2021-05-11 NOTE — PROGRESS NOTES
SW received request from pt's family to assist with transportation as Medicaid [address] cancelled pt's rides to appts and . SW offered to and then scheduled [employer] for pt needs for 5/11 and 5/12 appointment. They verbalized appreciation.     IMELDA intends to follow up and assist.

## 2021-05-12 ENCOUNTER — HOSPITAL ENCOUNTER (OUTPATIENT)
Dept: INFUSION THERAPY | Age: 56
Discharge: HOME | End: 2021-05-12
Payer: MEDICARE

## 2021-05-12 VITALS
WEIGHT: 165.2 LBS | BODY MASS INDEX: 23.7 KG/M2 | OXYGEN SATURATION: 97 % | DIASTOLIC BLOOD PRESSURE: 86 MMHG | SYSTOLIC BLOOD PRESSURE: 103 MMHG | HEART RATE: 124 BPM | TEMPERATURE: 98.2 F | RESPIRATION RATE: 18 BRPM

## 2021-05-12 LAB
ALBUMIN SERPL-MCNC: 2.8 G/DL (ref 3.5–5)
ALBUMIN/GLOB SERPL: 0.9 {RATIO} (ref 1.2–3.5)
ALP SERPL-CCNC: 84 U/L (ref 50–136)
ALT SERPL-CCNC: 28 U/L (ref 12–65)
ANION GAP SERPL CALC-SCNC: 7 MMOL/L (ref 7–16)
AST SERPL-CCNC: 12 U/L (ref 15–37)
BASOPHILS # BLD: 0 K/UL (ref 0–0.2)
BASOPHILS NFR BLD: 1 % (ref 0–2)
BILIRUB SERPL-MCNC: 0.1 MG/DL (ref 0.2–1.1)
BUN SERPL-MCNC: 6 MG/DL (ref 6–23)
CALCIUM SERPL-MCNC: 8.4 MG/DL (ref 8.3–10.4)
CHLORIDE SERPL-SCNC: 105 MMOL/L (ref 98–107)
CO2 SERPL-SCNC: 26 MMOL/L (ref 21–32)
CREAT SERPL-MCNC: 0.7 MG/DL (ref 0.8–1.5)
DIFFERENTIAL METHOD BLD: ABNORMAL
EOSINOPHIL # BLD: 0 K/UL (ref 0–0.8)
EOSINOPHIL NFR BLD: 0 % (ref 0.5–7.8)
ERYTHROCYTE [DISTWIDTH] IN BLOOD BY AUTOMATED COUNT: 17.2 % (ref 11.9–14.6)
GGT SERPL-CCNC: 72 U/L (ref 15–85)
GLOBULIN SER CALC-MCNC: 3.2 G/DL (ref 2.3–3.5)
GLUCOSE SERPL-MCNC: 103 MG/DL (ref 65–100)
HCT VFR BLD AUTO: 31.7 %
HGB BLD-MCNC: 10.3 G/DL (ref 13.6–17.2)
IMM GRANULOCYTES # BLD AUTO: 0.1 K/UL (ref 0–0.5)
IMM GRANULOCYTES NFR BLD AUTO: 2 % (ref 0–5)
LDH SERPL L TO P-CCNC: 205 U/L (ref 100–190)
LYMPHOCYTES # BLD: 2.3 K/UL (ref 0.5–4.6)
LYMPHOCYTES NFR BLD: 31 % (ref 13–44)
MAGNESIUM SERPL-MCNC: 1.7 MG/DL (ref 1.8–2.4)
MCH RBC QN AUTO: 26.3 PG (ref 26.1–32.9)
MCHC RBC AUTO-ENTMCNC: 32.5 G/DL (ref 31.4–35)
MCV RBC AUTO: 80.9 FL (ref 79.6–97.8)
MONOCYTES # BLD: 1.6 K/UL (ref 0.1–1.3)
MONOCYTES NFR BLD: 22 % (ref 4–12)
NEUTS SEG # BLD: 3.3 K/UL (ref 1.7–8.2)
NEUTS SEG NFR BLD: 44 % (ref 43–78)
NRBC # BLD: 0.02 K/UL (ref 0–0.2)
PHOSPHATE SERPL-MCNC: 3.2 MG/DL (ref 2.5–4.5)
PLATELET # BLD AUTO: 157 K/UL (ref 150–450)
PMV BLD AUTO: 11 FL (ref 9.4–12.3)
POTASSIUM SERPL-SCNC: 4 MMOL/L (ref 3.5–5.1)
PROT SERPL-MCNC: 6 G/DL (ref 6.3–8.2)
RBC # BLD AUTO: 3.92 M/UL (ref 4.23–5.6)
SODIUM SERPL-SCNC: 138 MMOL/L (ref 136–145)
WBC # BLD AUTO: 7.3 K/UL (ref 4.3–11.1)

## 2021-05-12 PROCEDURE — 85025 COMPLETE CBC W/AUTO DIFF WBC: CPT

## 2021-05-12 PROCEDURE — 82977 ASSAY OF GGT: CPT

## 2021-05-12 PROCEDURE — 74011250636 HC RX REV CODE- 250/636: Performed by: INTERNAL MEDICINE

## 2021-05-12 PROCEDURE — 83615 LACTATE (LD) (LDH) ENZYME: CPT

## 2021-05-12 PROCEDURE — 96365 THER/PROPH/DIAG IV INF INIT: CPT

## 2021-05-12 PROCEDURE — 84100 ASSAY OF PHOSPHORUS: CPT

## 2021-05-12 PROCEDURE — 83735 ASSAY OF MAGNESIUM: CPT

## 2021-05-12 PROCEDURE — 80053 COMPREHEN METABOLIC PANEL: CPT

## 2021-05-12 RX ORDER — MAGNESIUM SULFATE HEPTAHYDRATE 40 MG/ML
2 INJECTION, SOLUTION INTRAVENOUS ONCE
Status: COMPLETED | OUTPATIENT
Start: 2021-05-12 | End: 2021-05-12

## 2021-05-12 RX ORDER — SODIUM CHLORIDE 0.9 % (FLUSH) 0.9 %
10-40 SYRINGE (ML) INJECTION EVERY 8 HOURS
Status: CANCELLED | OUTPATIENT
Start: 2021-05-14

## 2021-05-12 RX ORDER — SODIUM CHLORIDE 0.9 % (FLUSH) 0.9 %
10-40 SYRINGE (ML) INJECTION EVERY 8 HOURS
Status: DISCONTINUED | OUTPATIENT
Start: 2021-05-12 | End: 2021-05-14 | Stop reason: HOSPADM

## 2021-05-12 RX ADMIN — MAGNESIUM SULFATE HEPTAHYDRATE 2 G: 40 INJECTION, SOLUTION INTRAVENOUS at 09:37

## 2021-05-12 RX ADMIN — Medication 20 ML: at 10:38

## 2021-05-12 NOTE — PROGRESS NOTES
SW received notification that pt arrived to appt early as they thought it was at 8 am instead of 9 am. SW cancelled Roundtrip first leg and left will call ride back to [facility] apartment, updating pt and wife. SW offered and provided 4 meal vouchers to assist with daily costs from [employer]. They verbalized appreciation. SW also scheduled Roundtrip for appt on 5/14 for drop off at 8 am, per schedule, updating family and pt. Pt verbalized appreciation. No other acute needs noted at this time. SW offered ongoing support and intends to follow up.

## 2021-05-12 NOTE — ROUTINE PROCESS
Diagnosis: Multiple Myeloma Transplant Date: 4/23/21 Day: (+/-) +19. Chemo regimen used: Melphalan Labs needed at next visit: sign&held Labs not resulted that need follow-up: None. Next Appointment scheduled: 5/14/21 @ 0800. BMT assessments and toxicities completed. WBC/ANC= 7.3/3.3 Prophylactic medications started Day +1 and stopped 5/5/21. Granix started on Day +6 and last dose given on 5/5/21. Toxicities greater than 2 : None. Bactrim or equivalent to be initiated on Day +30.   
Patient was seen by Dr. Estiven Gan. Pt to be seen by MD/NP until engraftment. New orders received: no 
Issues: none Patient arrived via wheelchair.  Labs drawn & reviewed and seen by Dr Leonel Warren reports Tino Eisenmenger no diarrhea or nausea and is feeling good.  today pt received  2 grams magnesium. Pt discharged via wheelchair in stable condition

## 2021-05-14 ENCOUNTER — HOSPITAL ENCOUNTER (OUTPATIENT)
Dept: INFUSION THERAPY | Age: 56
Discharge: HOME | End: 2021-05-14
Payer: MEDICARE

## 2021-05-14 VITALS
SYSTOLIC BLOOD PRESSURE: 96 MMHG | OXYGEN SATURATION: 96 % | DIASTOLIC BLOOD PRESSURE: 73 MMHG | WEIGHT: 162.4 LBS | BODY MASS INDEX: 23.3 KG/M2 | TEMPERATURE: 98.5 F | RESPIRATION RATE: 18 BRPM | HEART RATE: 116 BPM

## 2021-05-14 DIAGNOSIS — C90.00 MULTIPLE MYELOMA NOT HAVING ACHIEVED REMISSION (HCC): Primary | ICD-10-CM

## 2021-05-14 LAB
ALBUMIN SERPL-MCNC: 2.9 G/DL (ref 3.5–5)
ALBUMIN/GLOB SERPL: 0.9 {RATIO} (ref 1.2–3.5)
ALP SERPL-CCNC: 91 U/L (ref 50–136)
ALT SERPL-CCNC: 26 U/L (ref 12–65)
ANION GAP SERPL CALC-SCNC: 7 MMOL/L (ref 7–16)
APPEARANCE UR: CLEAR
AST SERPL-CCNC: 15 U/L (ref 15–37)
BASOPHILS # BLD: 0.1 K/UL (ref 0–0.2)
BASOPHILS NFR BLD: 1 % (ref 0–2)
BILIRUB SERPL-MCNC: 0.1 MG/DL (ref 0.2–1.1)
BILIRUB UR QL: NEGATIVE
BUN SERPL-MCNC: 3 MG/DL (ref 6–23)
CALCIUM SERPL-MCNC: 8.6 MG/DL (ref 8.3–10.4)
CHLORIDE SERPL-SCNC: 106 MMOL/L (ref 98–107)
CO2 SERPL-SCNC: 27 MMOL/L (ref 21–32)
COLOR UR: YELLOW
CREAT SERPL-MCNC: 0.6 MG/DL (ref 0.8–1.5)
DIFFERENTIAL METHOD BLD: ABNORMAL
EOSINOPHIL # BLD: 0.1 K/UL (ref 0–0.8)
EOSINOPHIL NFR BLD: 1 % (ref 0.5–7.8)
ERYTHROCYTE [DISTWIDTH] IN BLOOD BY AUTOMATED COUNT: 17.1 % (ref 11.9–14.6)
GGT SERPL-CCNC: 74 U/L (ref 15–85)
GLOBULIN SER CALC-MCNC: 3.3 G/DL (ref 2.3–3.5)
GLUCOSE SERPL-MCNC: 92 MG/DL (ref 65–100)
GLUCOSE UR STRIP.AUTO-MCNC: NEGATIVE MG/DL
HCT VFR BLD AUTO: 32.6 %
HGB BLD-MCNC: 10.6 G/DL (ref 13.6–17.2)
HGB UR QL STRIP: NEGATIVE
IMM GRANULOCYTES # BLD AUTO: 0.1 K/UL (ref 0–0.5)
IMM GRANULOCYTES NFR BLD AUTO: 1 % (ref 0–5)
KETONES UR QL STRIP.AUTO: NEGATIVE MG/DL
LDH SERPL L TO P-CCNC: 209 U/L (ref 100–190)
LEUKOCYTE ESTERASE UR QL STRIP.AUTO: NEGATIVE
LYMPHOCYTES # BLD: 3.4 K/UL (ref 0.5–4.6)
LYMPHOCYTES NFR BLD: 36 % (ref 13–44)
MAGNESIUM SERPL-MCNC: 1.5 MG/DL (ref 1.8–2.4)
MCH RBC QN AUTO: 26.1 PG (ref 26.1–32.9)
MCHC RBC AUTO-ENTMCNC: 32.5 G/DL (ref 31.4–35)
MCV RBC AUTO: 80.3 FL (ref 79.6–97.8)
MONOCYTES # BLD: 1.4 K/UL (ref 0.1–1.3)
MONOCYTES NFR BLD: 15 % (ref 4–12)
NEUTS SEG # BLD: 4.3 K/UL (ref 1.7–8.2)
NEUTS SEG NFR BLD: 46 % (ref 43–78)
NITRITE UR QL STRIP.AUTO: NEGATIVE
NRBC # BLD: 0 K/UL (ref 0–0.2)
PH UR STRIP: 8.5 [PH] (ref 5–9)
PHOSPHATE SERPL-MCNC: 3.5 MG/DL (ref 2.5–4.5)
PLATELET # BLD AUTO: 218 K/UL (ref 150–450)
PMV BLD AUTO: 10 FL (ref 9.4–12.3)
POTASSIUM SERPL-SCNC: 3.8 MMOL/L (ref 3.5–5.1)
PROT SERPL-MCNC: 6.2 G/DL (ref 6.3–8.2)
PROT UR STRIP-MCNC: ABNORMAL MG/DL
RBC # BLD AUTO: 4.06 M/UL (ref 4.23–5.6)
SODIUM SERPL-SCNC: 140 MMOL/L (ref 136–145)
SP GR UR REFRACTOMETRY: 1.02 (ref 1–1.02)
UROBILINOGEN UR QL STRIP.AUTO: 0.2 EU/DL (ref 0.2–1)
WBC # BLD AUTO: 9.5 K/UL (ref 4.3–11.1)

## 2021-05-14 PROCEDURE — 81003 URINALYSIS AUTO W/O SCOPE: CPT

## 2021-05-14 PROCEDURE — 96365 THER/PROPH/DIAG IV INF INIT: CPT

## 2021-05-14 PROCEDURE — 84100 ASSAY OF PHOSPHORUS: CPT

## 2021-05-14 PROCEDURE — 82977 ASSAY OF GGT: CPT

## 2021-05-14 PROCEDURE — 74011250636 HC RX REV CODE- 250/636: Performed by: INTERNAL MEDICINE

## 2021-05-14 PROCEDURE — 85025 COMPLETE CBC W/AUTO DIFF WBC: CPT

## 2021-05-14 PROCEDURE — 96366 THER/PROPH/DIAG IV INF ADDON: CPT

## 2021-05-14 PROCEDURE — 83615 LACTATE (LD) (LDH) ENZYME: CPT

## 2021-05-14 PROCEDURE — 83735 ASSAY OF MAGNESIUM: CPT

## 2021-05-14 PROCEDURE — 80053 COMPREHEN METABOLIC PANEL: CPT

## 2021-05-14 RX ORDER — DEXTROSE, SODIUM CHLORIDE, AND POTASSIUM CHLORIDE 5; .45; .15 G/100ML; G/100ML; G/100ML
1000 INJECTION INTRAVENOUS
Status: DISCONTINUED | OUTPATIENT
Start: 2021-05-14 | End: 2021-05-15 | Stop reason: HOSPADM

## 2021-05-14 RX ORDER — MAGNESIUM SULFATE HEPTAHYDRATE 40 MG/ML
4 INJECTION, SOLUTION INTRAVENOUS ONCE
Status: COMPLETED | OUTPATIENT
Start: 2021-05-14 | End: 2021-05-14

## 2021-05-14 RX ORDER — SODIUM CHLORIDE 0.9 % (FLUSH) 0.9 %
10 SYRINGE (ML) INJECTION AS NEEDED
Status: DISCONTINUED | OUTPATIENT
Start: 2021-05-14 | End: 2021-05-16 | Stop reason: HOSPADM

## 2021-05-14 RX ORDER — MAGNESIUM SULFATE HEPTAHYDRATE 40 MG/ML
2 INJECTION, SOLUTION INTRAVENOUS ONCE
Status: COMPLETED | OUTPATIENT
Start: 2021-05-14 | End: 2021-05-14

## 2021-05-14 RX ADMIN — MAGNESIUM SULFATE HEPTAHYDRATE 2 G: 40 INJECTION, SOLUTION INTRAVENOUS at 11:32

## 2021-05-14 RX ADMIN — MAGNESIUM SULFATE HEPTAHYDRATE 4 G: 40 INJECTION, SOLUTION INTRAVENOUS at 09:30

## 2021-05-14 RX ADMIN — Medication 10 ML: at 12:33

## 2021-05-14 NOTE — PROGRESS NOTES
Diagnosis: Multiple Myeloma  Transplant Date: 4/23/21  Day: (+/-) +21  Chemo regimen used: Melphalan  Labs needed at next visit: sign&held  Labs not resulted that need follow-up: None. Next Appointment scheduled: 5/17/21 @ 0900. BMT assessments and toxicities completed. WBC/ANC= 9.5/4.3  Prophylactic medications started Day +1 and stopped 5/5/21. Granix started on Day +6 and last dose given on 5/5/21. Toxicities greater than 2 : None. Bactrim or equivalent to be initiated on Day +30.    Patient was seen by Dr. Franci Rojas. Pt to be seen by MD/NP until engraftment. New orders received: no  Patient arrived via wheelchair.  Labs drawn & reviewed and seen by Dr Nitin Miramontes reports having pain & burning with urination. Urine specimen collected and sent to lab. Pt also complaints of abdominal bloating and discomfort. Pt verbalized he has not had a bowel movement in 3 days. Dr. Dyllan Vaca here to see pt and encouraged pt to increase fluids and ordered Lactulose for pt to take at home for constipation. Pt's wife verbalized they had another medication at home that they would take for constipation. Mg 6 gm Given IV. No fluids today per Dr. Dyllan Vaca. Pt discharged via w/c.

## 2021-05-17 ENCOUNTER — HOSPITAL ENCOUNTER (OUTPATIENT)
Dept: INFUSION THERAPY | Age: 56
Discharge: HOME | End: 2021-05-17
Payer: MEDICARE

## 2021-05-17 VITALS
SYSTOLIC BLOOD PRESSURE: 111 MMHG | HEART RATE: 114 BPM | TEMPERATURE: 98.2 F | OXYGEN SATURATION: 96 % | WEIGHT: 161.4 LBS | BODY MASS INDEX: 23.16 KG/M2 | RESPIRATION RATE: 18 BRPM | DIASTOLIC BLOOD PRESSURE: 73 MMHG

## 2021-05-17 LAB
ALBUMIN SERPL-MCNC: 3 G/DL (ref 3.5–5)
ALBUMIN/GLOB SERPL: 0.9 {RATIO} (ref 1.2–3.5)
ALP SERPL-CCNC: 89 U/L (ref 50–136)
ALT SERPL-CCNC: 21 U/L (ref 12–65)
ANION GAP SERPL CALC-SCNC: 5 MMOL/L (ref 7–16)
AST SERPL-CCNC: 14 U/L (ref 15–37)
BASOPHILS # BLD: 0.1 K/UL (ref 0–0.2)
BASOPHILS NFR BLD: 1 % (ref 0–2)
BILIRUB SERPL-MCNC: 0.2 MG/DL (ref 0.2–1.1)
BUN SERPL-MCNC: 6 MG/DL (ref 6–23)
CALCIUM SERPL-MCNC: 8.8 MG/DL (ref 8.3–10.4)
CHLORIDE SERPL-SCNC: 106 MMOL/L (ref 98–107)
CO2 SERPL-SCNC: 27 MMOL/L (ref 21–32)
CREAT SERPL-MCNC: 0.7 MG/DL (ref 0.8–1.5)
DIFFERENTIAL METHOD BLD: ABNORMAL
EOSINOPHIL # BLD: 0 K/UL (ref 0–0.8)
EOSINOPHIL NFR BLD: 0 % (ref 0.5–7.8)
ERYTHROCYTE [DISTWIDTH] IN BLOOD BY AUTOMATED COUNT: 17.2 % (ref 11.9–14.6)
GGT SERPL-CCNC: 69 U/L (ref 15–85)
GLOBULIN SER CALC-MCNC: 3.3 G/DL (ref 2.3–3.5)
GLUCOSE SERPL-MCNC: 90 MG/DL (ref 65–100)
HCT VFR BLD AUTO: 33 %
HGB BLD-MCNC: 10.5 G/DL (ref 13.6–17.2)
IMM GRANULOCYTES # BLD AUTO: 0.1 K/UL (ref 0–0.5)
IMM GRANULOCYTES NFR BLD AUTO: 1 % (ref 0–5)
LDH SERPL L TO P-CCNC: 200 U/L (ref 100–190)
LYMPHOCYTES # BLD: 3.5 K/UL (ref 0.5–4.6)
LYMPHOCYTES NFR BLD: 30 % (ref 13–44)
MAGNESIUM SERPL-MCNC: 1.7 MG/DL (ref 1.8–2.4)
MCH RBC QN AUTO: 25.9 PG (ref 26.1–32.9)
MCHC RBC AUTO-ENTMCNC: 31.8 G/DL (ref 31.4–35)
MCV RBC AUTO: 81.5 FL (ref 79.6–97.8)
MONOCYTES # BLD: 1.5 K/UL (ref 0.1–1.3)
MONOCYTES NFR BLD: 13 % (ref 4–12)
NEUTS SEG # BLD: 6.4 K/UL (ref 1.7–8.2)
NEUTS SEG NFR BLD: 55 % (ref 43–78)
NRBC # BLD: 0 K/UL (ref 0–0.2)
PHOSPHATE SERPL-MCNC: 3.3 MG/DL (ref 2.5–4.5)
PLATELET # BLD AUTO: 273 K/UL (ref 150–450)
PMV BLD AUTO: 10.2 FL (ref 9.4–12.3)
POTASSIUM SERPL-SCNC: 3.8 MMOL/L (ref 3.5–5.1)
PROT SERPL-MCNC: 6.3 G/DL (ref 6.3–8.2)
RBC # BLD AUTO: 4.05 M/UL (ref 4.23–5.6)
SODIUM SERPL-SCNC: 138 MMOL/L (ref 136–145)
WBC # BLD AUTO: 11.5 K/UL (ref 4.3–11.1)

## 2021-05-17 PROCEDURE — 96366 THER/PROPH/DIAG IV INF ADDON: CPT

## 2021-05-17 PROCEDURE — 96365 THER/PROPH/DIAG IV INF INIT: CPT

## 2021-05-17 PROCEDURE — 74011250636 HC RX REV CODE- 250/636: Performed by: INTERNAL MEDICINE

## 2021-05-17 PROCEDURE — 84100 ASSAY OF PHOSPHORUS: CPT

## 2021-05-17 PROCEDURE — 83615 LACTATE (LD) (LDH) ENZYME: CPT

## 2021-05-17 PROCEDURE — 80053 COMPREHEN METABOLIC PANEL: CPT

## 2021-05-17 PROCEDURE — 85025 COMPLETE CBC W/AUTO DIFF WBC: CPT

## 2021-05-17 PROCEDURE — 83735 ASSAY OF MAGNESIUM: CPT

## 2021-05-17 PROCEDURE — 82977 ASSAY OF GGT: CPT

## 2021-05-17 RX ORDER — SODIUM CHLORIDE 0.9 % (FLUSH) 0.9 %
10 SYRINGE (ML) INJECTION AS NEEDED
Status: DISCONTINUED | OUTPATIENT
Start: 2021-05-17 | End: 2021-05-19 | Stop reason: HOSPADM

## 2021-05-17 RX ORDER — MAGNESIUM SULFATE HEPTAHYDRATE 40 MG/ML
4 INJECTION, SOLUTION INTRAVENOUS ONCE
Status: COMPLETED | OUTPATIENT
Start: 2021-05-17 | End: 2021-05-17

## 2021-05-17 RX ADMIN — Medication 9.5 ML: at 11:15

## 2021-05-17 RX ADMIN — Medication 9.5 ML: at 09:26

## 2021-05-17 RX ADMIN — Medication 9.5 ML: at 13:19

## 2021-05-17 RX ADMIN — MAGNESIUM SULFATE HEPTAHYDRATE 4 G: 40 INJECTION, SOLUTION INTRAVENOUS at 11:19

## 2021-05-17 RX ADMIN — Medication 9.5 ML: at 11:16

## 2021-05-17 RX ADMIN — Medication 9.5 ML: at 09:25

## 2021-05-17 NOTE — PROGRESS NOTES
Diagnosis: Multiple Myeloma  Transplant Date: 4/23/2021  Day: (+/-) +24. Chemo regimen used: Melphalan  Labs needed at next visit: see orders. Labs not resulted that need follow-up: none. Next Appointment scheduled: 5/21/2021 @ 9 am.  BMT assessments and toxicities completed. WBC/ANC= 11.5/6.4. Prophylactic medications started Day +1 and stopped 5/5/2021. Granix started on Day +6 and last dose given on 5/5/2021. Toxicities greater than 2: none. Bactrim or equivalent to be initiated on Day +30. Patient seen by MD/NP (Dr. Dyllan Light) until engraftment. New orders received: Magnesium 1.7 - Per Dr. Dyllan Light, 4g Magnesium Sulfate given. Issues: Patient complaining of ongoing pain/burning with urination. Patient taking an OTC medication that is helping. Patient also complaining of soreness in his mouth and difficulty swallowing large pills. MD aware. Per Dr. Dyllan Light, no IVF today. Arrived to the [facility]. Labs, central line dressing change, and 4g Magnesium Sulfate completed. Patient tolerated well. Discharged via wheelchair with spouse.

## 2021-05-19 ENCOUNTER — APPOINTMENT (OUTPATIENT)
Dept: INFUSION THERAPY | Age: 56
End: 2021-05-19
Payer: MEDICARE

## 2021-05-21 ENCOUNTER — HOSPITAL ENCOUNTER (OUTPATIENT)
Dept: INFUSION THERAPY | Age: 56
Discharge: HOME | End: 2021-05-21
Payer: MEDICARE

## 2021-05-21 ENCOUNTER — TELEPHONE (OUTPATIENT)
Dept: ONCOLOGY | Age: 56
End: 2021-05-21

## 2021-05-21 VITALS
RESPIRATION RATE: 18 BRPM | TEMPERATURE: 98.5 F | WEIGHT: 163.8 LBS | DIASTOLIC BLOOD PRESSURE: 82 MMHG | BODY MASS INDEX: 23.5 KG/M2 | HEART RATE: 107 BPM | OXYGEN SATURATION: 96 % | SYSTOLIC BLOOD PRESSURE: 113 MMHG

## 2021-05-21 LAB
ALBUMIN SERPL-MCNC: 3 G/DL (ref 3.5–5)
ALBUMIN/GLOB SERPL: 0.9 {RATIO} (ref 1.2–3.5)
ALP SERPL-CCNC: 91 U/L (ref 50–136)
ALT SERPL-CCNC: 16 U/L (ref 12–65)
ANION GAP SERPL CALC-SCNC: 5 MMOL/L (ref 7–16)
AST SERPL-CCNC: 9 U/L (ref 15–37)
BASOPHILS # BLD: 0.1 K/UL (ref 0–0.2)
BASOPHILS NFR BLD: 1 % (ref 0–2)
BILIRUB SERPL-MCNC: 0.2 MG/DL (ref 0.2–1.1)
BUN SERPL-MCNC: 5 MG/DL (ref 6–23)
CALCIUM SERPL-MCNC: 8.8 MG/DL (ref 8.3–10.4)
CHLORIDE SERPL-SCNC: 104 MMOL/L (ref 98–107)
CO2 SERPL-SCNC: 28 MMOL/L (ref 21–32)
CREAT SERPL-MCNC: 0.5 MG/DL (ref 0.8–1.5)
DIFFERENTIAL METHOD BLD: ABNORMAL
EOSINOPHIL # BLD: 0.3 K/UL (ref 0–0.8)
EOSINOPHIL NFR BLD: 2 % (ref 0.5–7.8)
ERYTHROCYTE [DISTWIDTH] IN BLOOD BY AUTOMATED COUNT: 17.4 % (ref 11.9–14.6)
GGT SERPL-CCNC: 64 U/L (ref 15–85)
GLOBULIN SER CALC-MCNC: 3.3 G/DL (ref 2.3–3.5)
GLUCOSE SERPL-MCNC: 88 MG/DL (ref 65–100)
HCT VFR BLD AUTO: 32.2 %
HGB BLD-MCNC: 10.3 G/DL (ref 13.6–17.2)
IMM GRANULOCYTES # BLD AUTO: 0.2 K/UL (ref 0–0.5)
IMM GRANULOCYTES NFR BLD AUTO: 1 % (ref 0–5)
LDH SERPL L TO P-CCNC: 202 U/L (ref 100–190)
LYMPHOCYTES # BLD: 3.9 K/UL (ref 0.5–4.6)
LYMPHOCYTES NFR BLD: 26 % (ref 13–44)
MAGNESIUM SERPL-MCNC: 1.7 MG/DL (ref 1.8–2.4)
MCH RBC QN AUTO: 25.8 PG (ref 26.1–32.9)
MCHC RBC AUTO-ENTMCNC: 32 G/DL (ref 31.4–35)
MCV RBC AUTO: 80.7 FL (ref 79.6–97.8)
MONOCYTES # BLD: 2.3 K/UL (ref 0.1–1.3)
MONOCYTES NFR BLD: 16 % (ref 4–12)
NEUTS SEG # BLD: 8 K/UL (ref 1.7–8.2)
NEUTS SEG NFR BLD: 55 % (ref 43–78)
NRBC # BLD: 0 K/UL (ref 0–0.2)
PHOSPHATE SERPL-MCNC: 2.9 MG/DL (ref 2.5–4.5)
PLATELET # BLD AUTO: 315 K/UL (ref 150–450)
PMV BLD AUTO: 9.1 FL (ref 9.4–12.3)
POTASSIUM SERPL-SCNC: 3.7 MMOL/L (ref 3.5–5.1)
PROT SERPL-MCNC: 6.3 G/DL (ref 6.3–8.2)
RBC # BLD AUTO: 3.99 M/UL (ref 4.23–5.6)
SODIUM SERPL-SCNC: 137 MMOL/L (ref 136–145)
WBC # BLD AUTO: 14.7 K/UL (ref 4.3–11.1)

## 2021-05-21 PROCEDURE — 83615 LACTATE (LD) (LDH) ENZYME: CPT

## 2021-05-21 PROCEDURE — 84100 ASSAY OF PHOSPHORUS: CPT

## 2021-05-21 PROCEDURE — 85025 COMPLETE CBC W/AUTO DIFF WBC: CPT

## 2021-05-21 PROCEDURE — 80053 COMPREHEN METABOLIC PANEL: CPT

## 2021-05-21 PROCEDURE — 83735 ASSAY OF MAGNESIUM: CPT

## 2021-05-21 PROCEDURE — 96366 THER/PROPH/DIAG IV INF ADDON: CPT

## 2021-05-21 PROCEDURE — 82977 ASSAY OF GGT: CPT

## 2021-05-21 PROCEDURE — 74011250636 HC RX REV CODE- 250/636: Performed by: INTERNAL MEDICINE

## 2021-05-21 PROCEDURE — 96365 THER/PROPH/DIAG IV INF INIT: CPT

## 2021-05-21 RX ORDER — MAGNESIUM SULFATE HEPTAHYDRATE 40 MG/ML
2 INJECTION, SOLUTION INTRAVENOUS ONCE
Status: COMPLETED | OUTPATIENT
Start: 2021-05-21 | End: 2021-05-21

## 2021-05-21 RX ORDER — SODIUM CHLORIDE 0.9 % (FLUSH) 0.9 %
10-30 SYRINGE (ML) INJECTION AS NEEDED
Status: DISCONTINUED | OUTPATIENT
Start: 2021-05-21 | End: 2021-05-23 | Stop reason: HOSPADM

## 2021-05-21 RX ADMIN — MAGNESIUM SULFATE HEPTAHYDRATE 2 G: 40 INJECTION, SOLUTION INTRAVENOUS at 10:49

## 2021-05-21 RX ADMIN — MAGNESIUM SULFATE HEPTAHYDRATE 2 G: 40 INJECTION, SOLUTION INTRAVENOUS at 11:50

## 2021-05-21 RX ADMIN — Medication 10 ML: at 12:51

## 2021-05-21 NOTE — PROGRESS NOTES
Diagnosis: Multiple Myeloma  Transplant Date: 4/23/2021  Day: (+/-) +28  Chemo regimen used: Melphalan  Labs needed at next visit: see orders. Labs not resulted that need follow-up: none. Next Appointment scheduled: 5/24/2021 @ 9 am.  BMT assessments and toxicities completed. WBC/ANC= 14.7/8.0  Prophylactic medications started Day +1 and stopped 5/5/2021. Granix started on Day +6 and last dose given on 5/5/2021. Toxicities greater than 2: none. Bactrim or equivalent to be initiated on Day +30. Patient seen by MD/NP (Dr. Simone Machuca) until engraftment  Pt arrived to outpatient via w/c. Labs drawn & reviewed by Dr. Simone Machuca. Pt's Mg level  1.7. Mg 4 gm given IV as ordered by Dr. Simone Machuca. Pt denies any other complaints at this time.

## 2021-05-21 NOTE — TELEPHONE ENCOUNTER
IMELDA confirmed and called [facility] [phone] [employer] for pt's transportation between his home in [facility] during [facility] pt request, see schedule below. IMELDA confirmed via phone with pt's wife and no other needs identified.  IMELDA intends to follow up.        5/27/2021 oncology              van  at apartment 7:00 am               Confirmation number: [id number]  Call [phone] for ride back home     5/31/2021 oncology              van  at apartment 7:30 am               Confirmation number: [id number]  Call [phone] for ride back home

## 2021-05-24 ENCOUNTER — HOSPITAL ENCOUNTER (OUTPATIENT)
Dept: INFUSION THERAPY | Age: 56
Discharge: HOME | End: 2021-05-24
Payer: MEDICARE

## 2021-05-24 VITALS
WEIGHT: 165.6 LBS | DIASTOLIC BLOOD PRESSURE: 71 MMHG | HEART RATE: 111 BPM | SYSTOLIC BLOOD PRESSURE: 100 MMHG | RESPIRATION RATE: 18 BRPM | TEMPERATURE: 98.4 F | BODY MASS INDEX: 23.76 KG/M2 | OXYGEN SATURATION: 97 %

## 2021-05-24 LAB
ALBUMIN SERPL-MCNC: 2.9 G/DL (ref 3.5–5)
ALBUMIN/GLOB SERPL: 0.9 {RATIO} (ref 1.2–3.5)
ALP SERPL-CCNC: 88 U/L (ref 50–136)
ALT SERPL-CCNC: 32 U/L (ref 12–65)
ANION GAP SERPL CALC-SCNC: 6 MMOL/L (ref 7–16)
AST SERPL-CCNC: 18 U/L (ref 15–37)
BASOPHILS # BLD: 0 K/UL (ref 0–0.2)
BASOPHILS NFR BLD: 0 % (ref 0–2)
BILIRUB SERPL-MCNC: 0.1 MG/DL (ref 0.2–1.1)
BUN SERPL-MCNC: 6 MG/DL (ref 6–23)
CALCIUM SERPL-MCNC: 9.2 MG/DL (ref 8.3–10.4)
CHLORIDE SERPL-SCNC: 107 MMOL/L (ref 98–107)
CO2 SERPL-SCNC: 27 MMOL/L (ref 21–32)
CREAT SERPL-MCNC: 0.6 MG/DL (ref 0.8–1.5)
DIFFERENTIAL METHOD BLD: ABNORMAL
EOSINOPHIL # BLD: 0 K/UL (ref 0–0.8)
EOSINOPHIL NFR BLD: 0 % (ref 0.5–7.8)
ERYTHROCYTE [DISTWIDTH] IN BLOOD BY AUTOMATED COUNT: 17.6 % (ref 11.9–14.6)
GGT SERPL-CCNC: 64 U/L (ref 15–85)
GLOBULIN SER CALC-MCNC: 3.2 G/DL (ref 2.3–3.5)
GLUCOSE SERPL-MCNC: 106 MG/DL (ref 65–100)
HCT VFR BLD AUTO: 32.6 %
HGB BLD-MCNC: 10.3 G/DL (ref 13.6–17.2)
IMM GRANULOCYTES # BLD AUTO: 0.4 K/UL (ref 0–0.5)
IMM GRANULOCYTES NFR BLD AUTO: 3 % (ref 0–5)
LDH SERPL L TO P-CCNC: 210 U/L (ref 100–190)
LYMPHOCYTES # BLD: 3.2 K/UL (ref 0.5–4.6)
LYMPHOCYTES NFR BLD: 23 % (ref 13–44)
MAGNESIUM SERPL-MCNC: 1.6 MG/DL (ref 1.8–2.4)
MCH RBC QN AUTO: 26.1 PG (ref 26.1–32.9)
MCHC RBC AUTO-ENTMCNC: 31.6 G/DL (ref 31.4–35)
MCV RBC AUTO: 82.5 FL (ref 79.6–97.8)
MONOCYTES # BLD: 1.3 K/UL (ref 0.1–1.3)
MONOCYTES NFR BLD: 10 % (ref 4–12)
NEUTS SEG # BLD: 9 K/UL (ref 1.7–8.2)
NEUTS SEG NFR BLD: 65 % (ref 43–78)
NRBC # BLD: 0.02 K/UL (ref 0–0.2)
PHOSPHATE SERPL-MCNC: 3.9 MG/DL (ref 2.5–4.5)
PLATELET # BLD AUTO: 332 K/UL (ref 150–450)
PMV BLD AUTO: 9.5 FL (ref 9.4–12.3)
POTASSIUM SERPL-SCNC: 4 MMOL/L (ref 3.5–5.1)
PROT SERPL-MCNC: 6.1 G/DL (ref 6.3–8.2)
RBC # BLD AUTO: 3.95 M/UL (ref 4.23–5.6)
SODIUM SERPL-SCNC: 140 MMOL/L (ref 136–145)
WBC # BLD AUTO: 13.9 K/UL (ref 4.3–11.1)

## 2021-05-24 PROCEDURE — 80053 COMPREHEN METABOLIC PANEL: CPT

## 2021-05-24 PROCEDURE — 96365 THER/PROPH/DIAG IV INF INIT: CPT

## 2021-05-24 PROCEDURE — 82977 ASSAY OF GGT: CPT

## 2021-05-24 PROCEDURE — 83735 ASSAY OF MAGNESIUM: CPT

## 2021-05-24 PROCEDURE — 96366 THER/PROPH/DIAG IV INF ADDON: CPT

## 2021-05-24 PROCEDURE — 84100 ASSAY OF PHOSPHORUS: CPT

## 2021-05-24 PROCEDURE — 83615 LACTATE (LD) (LDH) ENZYME: CPT

## 2021-05-24 PROCEDURE — 74011250636 HC RX REV CODE- 250/636: Performed by: INTERNAL MEDICINE

## 2021-05-24 PROCEDURE — 85025 COMPLETE CBC W/AUTO DIFF WBC: CPT

## 2021-05-24 RX ORDER — MAGNESIUM SULFATE HEPTAHYDRATE 40 MG/ML
4 INJECTION, SOLUTION INTRAVENOUS ONCE
Status: COMPLETED | OUTPATIENT
Start: 2021-05-24 | End: 2021-05-24

## 2021-05-24 RX ORDER — SODIUM CHLORIDE 0.9 % (FLUSH) 0.9 %
10 SYRINGE (ML) INJECTION EVERY 8 HOURS
Status: DISCONTINUED | OUTPATIENT
Start: 2021-05-24 | End: 2021-05-26 | Stop reason: HOSPADM

## 2021-05-24 RX ADMIN — MAGNESIUM SULFATE HEPTAHYDRATE 4 G: 40 INJECTION, SOLUTION INTRAVENOUS at 10:50

## 2021-05-24 RX ADMIN — Medication 10 ML: at 12:50

## 2021-05-24 RX ADMIN — Medication 10 ML: at 09:15

## 2021-05-24 NOTE — PROGRESS NOTES
Problem: Knowledge Deficit  Goal: *Verbalizes understanding of procedures and medications  Outcome: Progressing Towards Goal     Problem: Patient Education:  Go to Education Activity  Goal: Patient/Family Education  Outcome: Progressing Towards Goal     Problem: Knowledge Deficit  Goal: *Participate in the learning process  Outcome: Progressing Towards Goal  Goal: *Verbalize description of procedure  Outcome: Progressing Towards Goal  Goal: *Verbalizes understanding and describes medication purposes and frequencies  Outcome: Progressing Towards Goal  Goal: *Knowledge of discharge instructions  Outcome: Progressing Towards Goal     Problem: Patient Education: Go to Patient Education Activity  Goal: Patient/Family Education  Outcome: Progressing Towards Goal

## 2021-05-24 NOTE — PROGRESS NOTES
Diagnosis: MM  Transplant Date: 04/23/2021  Day: + 31. Chemo regimen used:Melphalan  Labs needed at next visit: CBC, BMP  Labs not resulted that need follow-up:LDH, GGT. Next Appointment scheduled: 05/27/2021 0830. BMT assessments and toxicities completed. WBC/ANC= 13.9/9. Prophylactic medications started 04/22   Granix started on Day +6 and last dose given on 05/05/2021. Toxicities greater than 2 :none. Patient seen by Dr. Heike Raymundo today and weekly. New orders received: Yes. Issues:electrolyte replacements. 4 grams of Mag given. Pt still complains of swelling and pain in feet.

## 2021-05-27 ENCOUNTER — HOSPITAL ENCOUNTER (OUTPATIENT)
Dept: INFUSION THERAPY | Age: 56
Discharge: HOME | End: 2021-05-27
Payer: MEDICARE

## 2021-05-27 ENCOUNTER — PATIENT OUTREACH (OUTPATIENT)
Dept: CASE MANAGEMENT | Age: 56
End: 2021-05-27

## 2021-05-27 VITALS
OXYGEN SATURATION: 93 % | RESPIRATION RATE: 16 BRPM | HEART RATE: 94 BPM | DIASTOLIC BLOOD PRESSURE: 67 MMHG | SYSTOLIC BLOOD PRESSURE: 103 MMHG

## 2021-05-27 DIAGNOSIS — Z94.81 S/P AUTOLOGOUS BONE MARROW TRANSPLANTATION (HCC): Primary | ICD-10-CM

## 2021-05-27 DIAGNOSIS — Z94.81 S/P AUTOLOGOUS BONE MARROW TRANSPLANTATION (HCC): ICD-10-CM

## 2021-05-27 LAB
ALBUMIN SERPL-MCNC: 3.1 G/DL (ref 3.5–5)
ALBUMIN/GLOB SERPL: 0.9 {RATIO} (ref 1.2–3.5)
ALP SERPL-CCNC: 83 U/L (ref 50–136)
ALT SERPL-CCNC: 34 U/L (ref 12–65)
ANION GAP SERPL CALC-SCNC: 6 MMOL/L (ref 7–16)
AST SERPL-CCNC: 15 U/L (ref 15–37)
BASOPHILS # BLD: 0 K/UL (ref 0–0.2)
BASOPHILS NFR BLD: 0 % (ref 0–2)
BILIRUB SERPL-MCNC: 0.1 MG/DL (ref 0.2–1.1)
BUN SERPL-MCNC: 11 MG/DL (ref 6–23)
CALCIUM SERPL-MCNC: 8.7 MG/DL (ref 8.3–10.4)
CHLORIDE SERPL-SCNC: 106 MMOL/L (ref 98–107)
CO2 SERPL-SCNC: 28 MMOL/L (ref 21–32)
CREAT SERPL-MCNC: 0.8 MG/DL (ref 0.8–1.5)
DIFFERENTIAL METHOD BLD: ABNORMAL
EOSINOPHIL # BLD: 0 K/UL (ref 0–0.8)
EOSINOPHIL NFR BLD: 0 % (ref 0.5–7.8)
ERYTHROCYTE [DISTWIDTH] IN BLOOD BY AUTOMATED COUNT: 17.9 % (ref 11.9–14.6)
GGT SERPL-CCNC: 82 U/L (ref 15–85)
GLOBULIN SER CALC-MCNC: 3.6 G/DL (ref 2.3–3.5)
GLUCOSE SERPL-MCNC: 138 MG/DL (ref 65–100)
HCT VFR BLD AUTO: 33.6 %
HGB BLD-MCNC: 10.7 G/DL (ref 13.6–17.2)
IMM GRANULOCYTES # BLD AUTO: 0.7 K/UL (ref 0–0.5)
IMM GRANULOCYTES NFR BLD AUTO: 4 % (ref 0–5)
LDH SERPL L TO P-CCNC: 222 U/L (ref 100–190)
LYMPHOCYTES # BLD: 2.7 K/UL (ref 0.5–4.6)
LYMPHOCYTES NFR BLD: 16 % (ref 13–44)
MAGNESIUM SERPL-MCNC: 1.7 MG/DL (ref 1.8–2.4)
MCH RBC QN AUTO: 26.2 PG (ref 26.1–32.9)
MCHC RBC AUTO-ENTMCNC: 31.8 G/DL (ref 31.4–35)
MCV RBC AUTO: 82.2 FL (ref 79.6–97.8)
MONOCYTES # BLD: 1.2 K/UL (ref 0.1–1.3)
MONOCYTES NFR BLD: 7 % (ref 4–12)
NEUTS SEG # BLD: 12.9 K/UL (ref 1.7–8.2)
NEUTS SEG NFR BLD: 73 % (ref 43–78)
NRBC # BLD: 0.05 K/UL (ref 0–0.2)
PHOSPHATE SERPL-MCNC: 2.9 MG/DL (ref 2.5–4.5)
PLATELET # BLD AUTO: 326 K/UL (ref 150–450)
PMV BLD AUTO: 9.5 FL (ref 9.4–12.3)
POTASSIUM SERPL-SCNC: 3.8 MMOL/L (ref 3.5–5.1)
PROT SERPL-MCNC: 6.7 G/DL (ref 6.3–8.2)
RBC # BLD AUTO: 4.09 M/UL (ref 4.23–5.6)
SODIUM SERPL-SCNC: 140 MMOL/L (ref 136–145)
WBC # BLD AUTO: 17.6 K/UL (ref 4.3–11.1)

## 2021-05-27 PROCEDURE — 80053 COMPREHEN METABOLIC PANEL: CPT

## 2021-05-27 PROCEDURE — 82977 ASSAY OF GGT: CPT

## 2021-05-27 PROCEDURE — 36591 DRAW BLOOD OFF VENOUS DEVICE: CPT

## 2021-05-27 PROCEDURE — 96375 TX/PRO/DX INJ NEW DRUG ADDON: CPT

## 2021-05-27 PROCEDURE — 85025 COMPLETE CBC W/AUTO DIFF WBC: CPT

## 2021-05-27 PROCEDURE — 83735 ASSAY OF MAGNESIUM: CPT

## 2021-05-27 PROCEDURE — 84100 ASSAY OF PHOSPHORUS: CPT

## 2021-05-27 PROCEDURE — 83615 LACTATE (LD) (LDH) ENZYME: CPT

## 2021-05-27 PROCEDURE — 96365 THER/PROPH/DIAG IV INF INIT: CPT

## 2021-05-27 PROCEDURE — 74011250636 HC RX REV CODE- 250/636: Performed by: INTERNAL MEDICINE

## 2021-05-27 RX ORDER — SODIUM CHLORIDE 0.9 % (FLUSH) 0.9 %
10-40 SYRINGE (ML) INJECTION EVERY 8 HOURS
Status: CANCELLED | OUTPATIENT
Start: 2021-06-03

## 2021-05-27 RX ORDER — ONDANSETRON 2 MG/ML
8 INJECTION INTRAMUSCULAR; INTRAVENOUS ONCE
Status: COMPLETED | OUTPATIENT
Start: 2021-05-27 | End: 2021-05-27

## 2021-05-27 RX ORDER — SODIUM CHLORIDE 0.9 % (FLUSH) 0.9 %
5 SYRINGE (ML) INJECTION AS NEEDED
Status: DISCONTINUED | OUTPATIENT
Start: 2021-05-27 | End: 2021-05-29 | Stop reason: HOSPADM

## 2021-05-27 RX ORDER — SODIUM CHLORIDE 0.9 % (FLUSH) 0.9 %
10-40 SYRINGE (ML) INJECTION EVERY 8 HOURS
Status: DISCONTINUED | OUTPATIENT
Start: 2021-05-27 | End: 2021-05-29 | Stop reason: HOSPADM

## 2021-05-27 RX ADMIN — Medication 10 ML: at 09:36

## 2021-05-27 RX ADMIN — POTASSIUM CHLORIDE: 2 INJECTION, SOLUTION, CONCENTRATE INTRAVENOUS at 11:08

## 2021-05-27 RX ADMIN — ONDANSETRON 8 MG: 2 INJECTION INTRAMUSCULAR; INTRAVENOUS at 10:55

## 2021-05-27 RX ADMIN — Medication 20 ML: at 13:15

## 2021-05-27 NOTE — PROGRESS NOTES
5/27 D+34 Pt came in for a follow up visit. He is doing well. Labs are stabling out. Today he will get 4g Mag/20mEq K+ in 500mL NS along with 8mg of Zofran. Sent in his bactrim for him to start taking 3 times a week. Pt is concerned about his tastes and Dr. Celestine Bravo instructed his taste should come back in about 10 days. We will see him again in infusion next week.

## 2021-05-27 NOTE — PROGRESS NOTES
Diagnosis: MM  Transplant Date: 04/23/2021  Day: + 34. Chemo regimen used:Melphalan  Labs needed at next visit: sign&held  Labs not resulted that need follow-up: none  Next Appointment scheduled: 06/03/2021 0900. BMT assessments and toxicities completed. WBC/ANC= 17.6/12.9  Prophylactic medications started 04/22   Granix started on Day +6 and last dose given on 05/05/2021. Toxicities greater than 2 :none. Patient seen by Dr. Allyson Manning today and weekly. New orders received: Yes. Issues:electrolyte replacements. 4 grams of Mag, 20mEqK+ in 500cns. Dressing changed to central line. Pt still complains of swelling and pain in feet.

## 2021-05-27 NOTE — PROGRESS NOTES
Patient arrived to labs for lab draw. Hemodialysis line accessed and Labs drawn per order. Patient discharged via wheelchair.   Kermit Mack RN

## 2021-05-31 ENCOUNTER — APPOINTMENT (OUTPATIENT)
Dept: INFUSION THERAPY | Age: 56
End: 2021-05-31

## 2021-06-03 ENCOUNTER — HOSPITAL ENCOUNTER (OUTPATIENT)
Dept: INFUSION THERAPY | Age: 56
Discharge: HOME | End: 2021-06-03
Payer: MEDICARE

## 2021-06-03 VITALS
DIASTOLIC BLOOD PRESSURE: 73 MMHG | SYSTOLIC BLOOD PRESSURE: 106 MMHG | WEIGHT: 165 LBS | BODY MASS INDEX: 23.68 KG/M2 | TEMPERATURE: 98.1 F | OXYGEN SATURATION: 95 % | HEART RATE: 109 BPM | RESPIRATION RATE: 16 BRPM

## 2021-06-03 DIAGNOSIS — C90.00 MULTIPLE MYELOMA NOT HAVING ACHIEVED REMISSION (HCC): ICD-10-CM

## 2021-06-03 DIAGNOSIS — Z94.81 S/P AUTOLOGOUS BONE MARROW TRANSPLANTATION (HCC): Primary | ICD-10-CM

## 2021-06-03 LAB
ALBUMIN SERPL-MCNC: 3.2 G/DL (ref 3.5–5)
ALBUMIN/GLOB SERPL: 1 {RATIO} (ref 1.2–3.5)
ALP SERPL-CCNC: 82 U/L (ref 50–136)
ALT SERPL-CCNC: 48 U/L (ref 12–65)
ANION GAP SERPL CALC-SCNC: 6 MMOL/L (ref 7–16)
AST SERPL-CCNC: 19 U/L (ref 15–37)
BASOPHILS # BLD: 0 K/UL (ref 0–0.2)
BASOPHILS NFR BLD: 0 % (ref 0–2)
BILIRUB SERPL-MCNC: 0.2 MG/DL (ref 0.2–1.1)
BUN SERPL-MCNC: 9 MG/DL (ref 6–23)
CALCIUM SERPL-MCNC: 9.2 MG/DL (ref 8.3–10.4)
CHLORIDE SERPL-SCNC: 104 MMOL/L (ref 98–107)
CO2 SERPL-SCNC: 27 MMOL/L (ref 21–32)
CREAT SERPL-MCNC: 0.8 MG/DL (ref 0.8–1.5)
DIFFERENTIAL METHOD BLD: ABNORMAL
EOSINOPHIL # BLD: 0 K/UL (ref 0–0.8)
EOSINOPHIL NFR BLD: 0 % (ref 0.5–7.8)
ERYTHROCYTE [DISTWIDTH] IN BLOOD BY AUTOMATED COUNT: 19.5 % (ref 11.9–14.6)
GLOBULIN SER CALC-MCNC: 3.3 G/DL (ref 2.3–3.5)
GLUCOSE SERPL-MCNC: 110 MG/DL (ref 65–100)
HCT VFR BLD AUTO: 34.3 %
HGB BLD-MCNC: 10.8 G/DL (ref 13.6–17.2)
IMM GRANULOCYTES # BLD AUTO: 0.3 K/UL (ref 0–0.5)
IMM GRANULOCYTES NFR BLD AUTO: 1 % (ref 0–5)
LYMPHOCYTES # BLD: 3.8 K/UL (ref 0.5–4.6)
LYMPHOCYTES NFR BLD: 19 % (ref 13–44)
MAGNESIUM SERPL-MCNC: 1.8 MG/DL (ref 1.8–2.4)
MCH RBC QN AUTO: 26 PG (ref 26.1–32.9)
MCHC RBC AUTO-ENTMCNC: 31.5 G/DL (ref 31.4–35)
MCV RBC AUTO: 82.7 FL (ref 79.6–97.8)
MONOCYTES # BLD: 1 K/UL (ref 0.1–1.3)
MONOCYTES NFR BLD: 5 % (ref 4–12)
NEUTS SEG # BLD: 14.4 K/UL (ref 1.7–8.2)
NEUTS SEG NFR BLD: 74 % (ref 43–78)
NRBC # BLD: 0 K/UL (ref 0–0.2)
PHOSPHATE SERPL-MCNC: 3.1 MG/DL (ref 2.5–4.5)
PLATELET # BLD AUTO: 225 K/UL (ref 150–450)
PMV BLD AUTO: 9.5 FL (ref 9.4–12.3)
POTASSIUM SERPL-SCNC: 4.1 MMOL/L (ref 3.5–5.1)
PROT SERPL-MCNC: 6.5 G/DL (ref 6.3–8.2)
RBC # BLD AUTO: 4.15 M/UL (ref 4.23–5.6)
SODIUM SERPL-SCNC: 137 MMOL/L (ref 136–145)
WBC # BLD AUTO: 19.4 K/UL (ref 4.3–11.1)

## 2021-06-03 PROCEDURE — 85025 COMPLETE CBC W/AUTO DIFF WBC: CPT

## 2021-06-03 PROCEDURE — 84100 ASSAY OF PHOSPHORUS: CPT

## 2021-06-03 PROCEDURE — 96366 THER/PROPH/DIAG IV INF ADDON: CPT

## 2021-06-03 PROCEDURE — 83735 ASSAY OF MAGNESIUM: CPT

## 2021-06-03 PROCEDURE — 80053 COMPREHEN METABOLIC PANEL: CPT

## 2021-06-03 PROCEDURE — 96365 THER/PROPH/DIAG IV INF INIT: CPT

## 2021-06-03 PROCEDURE — 74011250636 HC RX REV CODE- 250/636: Performed by: INTERNAL MEDICINE

## 2021-06-03 RX ORDER — MAGNESIUM SULFATE HEPTAHYDRATE 40 MG/ML
4 INJECTION, SOLUTION INTRAVENOUS ONCE
Status: COMPLETED | OUTPATIENT
Start: 2021-06-03 | End: 2021-06-03

## 2021-06-03 RX ORDER — SODIUM CHLORIDE 0.9 % (FLUSH) 0.9 %
10 SYRINGE (ML) INJECTION AS NEEDED
Status: DISCONTINUED | OUTPATIENT
Start: 2021-06-03 | End: 2021-06-05 | Stop reason: HOSPADM

## 2021-06-03 RX ADMIN — MAGNESIUM SULFATE HEPTAHYDRATE 4 G: 40 INJECTION, SOLUTION INTRAVENOUS at 10:00

## 2021-06-03 RX ADMIN — Medication 10 ML: at 09:00

## 2021-06-03 RX ADMIN — Medication 10 ML: at 12:01

## 2021-06-07 ENCOUNTER — HOSPITAL ENCOUNTER (OUTPATIENT)
Dept: INFUSION THERAPY | Age: 56
End: 2021-06-07
Payer: MEDICARE

## 2021-06-10 ENCOUNTER — HOSPITAL ENCOUNTER (OUTPATIENT)
Dept: INFUSION THERAPY | Age: 56
Discharge: HOME | End: 2021-06-10
Payer: MEDICARE

## 2021-06-10 VITALS
HEART RATE: 111 BPM | WEIGHT: 178.2 LBS | RESPIRATION RATE: 18 BRPM | TEMPERATURE: 97.9 F | OXYGEN SATURATION: 92 % | SYSTOLIC BLOOD PRESSURE: 113 MMHG | BODY MASS INDEX: 25.57 KG/M2 | DIASTOLIC BLOOD PRESSURE: 76 MMHG

## 2021-06-10 DIAGNOSIS — Z94.81 S/P AUTOLOGOUS BONE MARROW TRANSPLANTATION (HCC): Primary | ICD-10-CM

## 2021-06-10 LAB
ALBUMIN SERPL-MCNC: 3 G/DL (ref 3.5–5)
ALBUMIN/GLOB SERPL: 0.9 {RATIO} (ref 1.2–3.5)
ALP SERPL-CCNC: 94 U/L (ref 50–136)
ALT SERPL-CCNC: 43 U/L (ref 12–65)
ANION GAP SERPL CALC-SCNC: 5 MMOL/L (ref 7–16)
AST SERPL-CCNC: 19 U/L (ref 15–37)
BASOPHILS # BLD: 0 K/UL (ref 0–0.2)
BASOPHILS NFR BLD: 0 % (ref 0–2)
BILIRUB SERPL-MCNC: 0.2 MG/DL (ref 0.2–1.1)
BUN SERPL-MCNC: 13 MG/DL (ref 6–23)
CALCIUM SERPL-MCNC: 9.4 MG/DL (ref 8.3–10.4)
CHLORIDE SERPL-SCNC: 104 MMOL/L (ref 98–107)
CO2 SERPL-SCNC: 29 MMOL/L (ref 21–32)
CREAT SERPL-MCNC: 0.5 MG/DL (ref 0.8–1.5)
DIFFERENTIAL METHOD BLD: ABNORMAL
EOSINOPHIL # BLD: 0.4 K/UL (ref 0–0.8)
EOSINOPHIL NFR BLD: 3 % (ref 0.5–7.8)
ERYTHROCYTE [DISTWIDTH] IN BLOOD BY AUTOMATED COUNT: 20.7 % (ref 11.9–14.6)
GGT SERPL-CCNC: 123 U/L (ref 15–85)
GLOBULIN SER CALC-MCNC: 3.5 G/DL (ref 2.3–3.5)
GLUCOSE SERPL-MCNC: 84 MG/DL (ref 65–100)
HCT VFR BLD AUTO: 33.9 %
HGB BLD-MCNC: 10.5 G/DL (ref 13.6–17.2)
IMM GRANULOCYTES # BLD AUTO: 0.1 K/UL (ref 0–0.5)
IMM GRANULOCYTES NFR BLD AUTO: 1 % (ref 0–5)
LDH SERPL L TO P-CCNC: 257 U/L (ref 100–190)
LYMPHOCYTES # BLD: 4 K/UL (ref 0.5–4.6)
LYMPHOCYTES NFR BLD: 26 % (ref 13–44)
MAGNESIUM SERPL-MCNC: 1.7 MG/DL (ref 1.8–2.4)
MCH RBC QN AUTO: 26 PG (ref 26.1–32.9)
MCHC RBC AUTO-ENTMCNC: 31 G/DL (ref 31.4–35)
MCV RBC AUTO: 83.9 FL (ref 79.6–97.8)
MONOCYTES # BLD: 1.1 K/UL (ref 0.1–1.3)
MONOCYTES NFR BLD: 7 % (ref 4–12)
NEUTS SEG # BLD: 10 K/UL (ref 1.7–8.2)
NEUTS SEG NFR BLD: 64 % (ref 43–78)
NRBC # BLD: 0 K/UL (ref 0–0.2)
PHOSPHATE SERPL-MCNC: 4.1 MG/DL (ref 2.5–4.5)
PLATELET # BLD AUTO: 186 K/UL (ref 150–450)
PMV BLD AUTO: 9.3 FL (ref 9.4–12.3)
POTASSIUM SERPL-SCNC: 3.9 MMOL/L (ref 3.5–5.1)
PROT SERPL-MCNC: 6.5 G/DL (ref 6.3–8.2)
RBC # BLD AUTO: 4.04 M/UL (ref 4.23–5.6)
SODIUM SERPL-SCNC: 138 MMOL/L (ref 136–145)
WBC # BLD AUTO: 15.7 K/UL (ref 4.3–11.1)

## 2021-06-10 PROCEDURE — 96366 THER/PROPH/DIAG IV INF ADDON: CPT

## 2021-06-10 PROCEDURE — 85025 COMPLETE CBC W/AUTO DIFF WBC: CPT

## 2021-06-10 PROCEDURE — 82977 ASSAY OF GGT: CPT

## 2021-06-10 PROCEDURE — 83735 ASSAY OF MAGNESIUM: CPT

## 2021-06-10 PROCEDURE — 83615 LACTATE (LD) (LDH) ENZYME: CPT

## 2021-06-10 PROCEDURE — 74011250636 HC RX REV CODE- 250/636: Performed by: INTERNAL MEDICINE

## 2021-06-10 PROCEDURE — 80053 COMPREHEN METABOLIC PANEL: CPT

## 2021-06-10 PROCEDURE — 84100 ASSAY OF PHOSPHORUS: CPT

## 2021-06-10 PROCEDURE — 96365 THER/PROPH/DIAG IV INF INIT: CPT

## 2021-06-10 RX ORDER — MAGNESIUM SULFATE HEPTAHYDRATE 40 MG/ML
4 INJECTION, SOLUTION INTRAVENOUS ONCE
Status: COMPLETED | OUTPATIENT
Start: 2021-06-10 | End: 2021-06-10

## 2021-06-10 RX ORDER — SODIUM CHLORIDE 0.9 % (FLUSH) 0.9 %
10-40 SYRINGE (ML) INJECTION EVERY 8 HOURS
Status: DISCONTINUED | OUTPATIENT
Start: 2021-06-10 | End: 2021-06-12 | Stop reason: HOSPADM

## 2021-06-10 RX ADMIN — Medication 10 ML: at 12:00

## 2021-06-10 RX ADMIN — Medication 10 ML: at 09:05

## 2021-06-10 RX ADMIN — Medication 20 ML: at 09:32

## 2021-06-10 RX ADMIN — MAGNESIUM SULFATE HEPTAHYDRATE 4 G: 40 INJECTION, SOLUTION INTRAVENOUS at 10:04

## 2021-06-10 NOTE — PROGRESS NOTES
Diagnosis: Multiple Myeloma  Transplant Date: 4/23/21  Day: (+/-) +48. Chemo regimen used: Melphalan  Labs needed at next visit: see orders. Labs not resulted that need follow-up: None. Next Appointment scheduled: 6/24/21. BMT assessments and toxicities completed. WBC/ANC= 15.7/10.0. Prophylactic medications started Day +1 and stopped. Granix started on Day +6 and last dose given on 5/5/21. Toxicities greater than 2 : None. Bactrim or equivalent initiated on - is not on Bactrim - having insurance issues. Will  today from pharmacy and start. Patient seen by MD/NP Dr Alexandra Salmon until engraftment. New orders received: None. Issues: Issues with obtaining Bactrim - will  from pharmacy today. Mg+ 1.7 - patient received 4 grams IV Mg+  Navigator to schedule patient for apheresis catheter removal. Office will contact patient. Appointment made for return 6/17 at 0930 for apheresis catheter dressing change if needed. Discharged via wheelchair.

## 2021-06-17 ENCOUNTER — HOSPITAL ENCOUNTER (OUTPATIENT)
Dept: INTERVENTIONAL RADIOLOGY/VASCULAR | Age: 56
Discharge: HOME | End: 2021-06-17
Attending: INTERNAL MEDICINE
Payer: MEDICARE

## 2021-06-17 VITALS
DIASTOLIC BLOOD PRESSURE: 83 MMHG | TEMPERATURE: 97.9 F | HEART RATE: 108 BPM | RESPIRATION RATE: 18 BRPM | OXYGEN SATURATION: 94 % | SYSTOLIC BLOOD PRESSURE: 122 MMHG

## 2021-06-17 DIAGNOSIS — Z94.81 BONE MARROW TRANSPLANT STATUS (HCC): ICD-10-CM

## 2021-06-17 PROCEDURE — 36589 REMOVAL TUNNELED CV CATH: CPT

## 2021-06-17 PROCEDURE — 74011000250 HC RX REV CODE- 250: Performed by: PHYSICIAN ASSISTANT

## 2021-06-17 RX ADMIN — Medication 5 ML: at 14:45

## 2021-06-17 NOTE — DISCHARGE INSTRUCTIONS
DRAIN/PORT/CATHETER REMOVAL    DISCHARGE INSTRUCTIONS    General Information:     Your doctor has ordered for us to remove your drain, port, or catheter. This could be that you do not need it anymore, it is not doing its job, your physician has decided on another plan for your treatment and/or it may need replacing. Home Care Instructions: You can resume your regular diet and medication regimen. Do not drink alcohol, drive, or make any important legal decisions in the next 24 hours. Do not lift anything heavier than a gallon of milk, or do anything strenuous for the next 24 hours. You will notice a dressing over the site of the removal. This dressing should stay in place until the site is healed. The dressing should be changed at least every 48 hours. You should change the dressing sooner if it becomes soiled or wet. The nurse who discharges you to home should review with you any wound care instructions. Resume your normal level of activity slowly. You may shower after 24 hours, but do not take a bath or swim or immerse yourself in water until the site has healed, and keep the dressing dry with plastic wrap while showering. The site may ooze for a couple of days. This drainage should lessen with each passing day. Call If:     You should call your Physician and/or the Radiology Nurse if you have any bleeding other than a small spot on your bandage. Call if you have any signs of infection, fever, or increased pain at the site of the tube. Call if the oozing increases, if it changes color, or begins to have an odor. Follow-Up Instructions: Please see your ordering doctor as he/she has requested.      To Reach Us:        Patient Signature:  Date: 6/17/2021  Discharging Nurse: Anthony Johnston RN  Interventional Radiology General Nurse Discharge    After general anesthesia or intravenous sedation, for 24 hours or while taking prescription Narcotics:  · Limit your activities  · Do not drive and operate hazardous machinery  · Do not make important personal or business decisions  · Do  not drink alcoholic beverages  · If you have not urinated within 8 hours after discharge, please contact your surgeon on call. * Please give a list of your current medications to your Primary Care Provider. * Please update this list whenever your medications are discontinued, doses are     changed, or new medications (including over-the-counter products) are added. * Please carry medication information at all times in case of emergency situations. These are general instructions for a healthy lifestyle:    No smoking/ No tobacco products/ Avoid exposure to second hand smoke  Surgeon General's Warning:  Quitting smoking now greatly reduces serious risk to your health. Obesity, smoking, and sedentary lifestyle greatly increases your risk for illness  A healthy diet, regular physical exercise & weight monitoring are important for maintaining a healthy lifestyle    You may be retaining fluid if you have a history of heart failure or if you experience any of the following symptoms:  Weight gain of 3 pounds or more overnight or 5 pounds in a week, increased swelling in our hands or feet or shortness of breath while lying flat in bed. Please call your doctor as soon as you notice any of these symptoms; do not wait until your next office visit. Recognize signs and symptoms of STROKE:  F-face looks uneven    A-arms unable to move or move unevenly    S-speech slurred or non-existent    T-time-call 911 as soon as signs and symptoms begin-DO NOT go       Back to bed or wait to see if you get better-TIME IS BRAIN. If you have any questions about your procedure, please call the Interventional Radiology department at [phone]. After business hours (5pm) and weekends, call the answering service at [phone] and ask for the Radiologist on call to be paged.    Si tiene Preguntas acerca del procedimiento, por favor llame al departamento de Radiología Intervencional al [phone]. Después de horas de oficina (5 pm) y los fines de [place], llamar al [place] mor[facility] [phone] y pregunte por el Radiologo de [facility].

## 2021-06-23 ENCOUNTER — PATIENT OUTREACH (OUTPATIENT)
Dept: CASE MANAGEMENT | Age: 56
End: 2021-06-23

## 2021-06-23 ENCOUNTER — HOSPITAL ENCOUNTER (OUTPATIENT)
Dept: INFUSION THERAPY | Age: 56
Discharge: HOME | End: 2021-06-23
Payer: MEDICARE

## 2021-06-23 VITALS
OXYGEN SATURATION: 93 % | DIASTOLIC BLOOD PRESSURE: 94 MMHG | HEART RATE: 107 BPM | BODY MASS INDEX: 24.19 KG/M2 | WEIGHT: 168.6 LBS | TEMPERATURE: 98.2 F | RESPIRATION RATE: 16 BRPM | SYSTOLIC BLOOD PRESSURE: 120 MMHG

## 2021-06-23 DIAGNOSIS — C90.00 MULTIPLE MYELOMA NOT HAVING ACHIEVED REMISSION (HCC): ICD-10-CM

## 2021-06-23 DIAGNOSIS — Z94.81 S/P AUTOLOGOUS BONE MARROW TRANSPLANTATION (HCC): Primary | ICD-10-CM

## 2021-06-23 LAB
ALBUMIN SERPL-MCNC: 3.1 G/DL (ref 3.5–5)
ALBUMIN/GLOB SERPL: 0.8 {RATIO} (ref 1.2–3.5)
ALP SERPL-CCNC: 111 U/L (ref 50–136)
ALT SERPL-CCNC: 14 U/L (ref 12–65)
ANION GAP SERPL CALC-SCNC: 5 MMOL/L (ref 7–16)
AST SERPL-CCNC: 15 U/L (ref 15–37)
BASOPHILS # BLD: 0.1 K/UL (ref 0–0.2)
BASOPHILS NFR BLD: 1 % (ref 0–2)
BILIRUB SERPL-MCNC: 0.2 MG/DL (ref 0.2–1.1)
BUN SERPL-MCNC: 6 MG/DL (ref 6–23)
CALCIUM SERPL-MCNC: 8.7 MG/DL (ref 8.3–10.4)
CHLORIDE SERPL-SCNC: 106 MMOL/L (ref 98–107)
CO2 SERPL-SCNC: 29 MMOL/L (ref 21–32)
CREAT SERPL-MCNC: 0.7 MG/DL (ref 0.8–1.5)
DIFFERENTIAL METHOD BLD: ABNORMAL
EOSINOPHIL # BLD: 0.1 K/UL (ref 0–0.8)
EOSINOPHIL NFR BLD: 1 % (ref 0.5–7.8)
ERYTHROCYTE [DISTWIDTH] IN BLOOD BY AUTOMATED COUNT: 19.4 % (ref 11.9–14.6)
GLOBULIN SER CALC-MCNC: 3.7 G/DL (ref 2.3–3.5)
GLUCOSE SERPL-MCNC: 93 MG/DL (ref 65–100)
HCT VFR BLD AUTO: 34.6 %
HGB BLD-MCNC: 11 G/DL (ref 13.6–17.2)
IMM GRANULOCYTES # BLD AUTO: 0 K/UL (ref 0–0.5)
IMM GRANULOCYTES NFR BLD AUTO: 0 % (ref 0–5)
LYMPHOCYTES # BLD: 5 K/UL (ref 0.5–4.6)
LYMPHOCYTES NFR BLD: 49 % (ref 13–44)
MAGNESIUM SERPL-MCNC: 2 MG/DL (ref 1.8–2.4)
MCH RBC QN AUTO: 26.4 PG (ref 26.1–32.9)
MCHC RBC AUTO-ENTMCNC: 31.8 G/DL (ref 31.4–35)
MCV RBC AUTO: 83 FL (ref 79.6–97.8)
MONOCYTES # BLD: 0.7 K/UL (ref 0.1–1.3)
MONOCYTES NFR BLD: 7 % (ref 4–12)
NEUTS SEG # BLD: 4.3 K/UL (ref 1.7–8.2)
NEUTS SEG NFR BLD: 42 % (ref 43–78)
NRBC # BLD: 0 K/UL (ref 0–0.2)
PHOSPHATE SERPL-MCNC: 2.9 MG/DL (ref 2.5–4.5)
PLATELET # BLD AUTO: 226 K/UL (ref 150–450)
PMV BLD AUTO: 9.5 FL (ref 9.4–12.3)
POTASSIUM SERPL-SCNC: 3.3 MMOL/L (ref 3.5–5.1)
PROT SERPL-MCNC: 6.8 G/DL (ref 6.3–8.2)
RBC # BLD AUTO: 4.17 M/UL (ref 4.23–5.6)
SODIUM SERPL-SCNC: 140 MMOL/L (ref 136–145)
WBC # BLD AUTO: 10.2 K/UL (ref 4.3–11.1)

## 2021-06-23 PROCEDURE — 84100 ASSAY OF PHOSPHORUS: CPT

## 2021-06-23 PROCEDURE — 74011250637 HC RX REV CODE- 250/637: Performed by: INTERNAL MEDICINE

## 2021-06-23 PROCEDURE — 80053 COMPREHEN METABOLIC PANEL: CPT

## 2021-06-23 PROCEDURE — 85025 COMPLETE CBC W/AUTO DIFF WBC: CPT

## 2021-06-23 PROCEDURE — 99212 OFFICE O/P EST SF 10 MIN: CPT

## 2021-06-23 PROCEDURE — 83735 ASSAY OF MAGNESIUM: CPT

## 2021-06-23 RX ORDER — SODIUM CHLORIDE 0.9 % (FLUSH) 0.9 %
10 SYRINGE (ML) INJECTION AS NEEDED
Status: ACTIVE | OUTPATIENT
Start: 2021-06-23 | End: 2021-06-23

## 2021-06-23 RX ORDER — POTASSIUM CHLORIDE 750 MG/1
40 TABLET, EXTENDED RELEASE ORAL
Status: COMPLETED | OUTPATIENT
Start: 2021-06-23 | End: 2021-06-23

## 2021-06-23 RX ADMIN — POTASSIUM CHLORIDE 40 MEQ: 750 TABLET, EXTENDED RELEASE ORAL at 11:43

## 2021-06-23 NOTE — PROGRESS NOTES
6/23 D+61 Seen pt in infusion. He is doing great, swelling in his BLE has decreased. New orders for 40mEq of oral K+ for today. Will see pt in 2 weeks then start to schedule his D100 studies.

## 2021-06-23 NOTE — PROGRESS NOTES
Diagnosis: Multiple Myeloma  Transplant Date: 4/23/21  Day: (+/-) +72. Chemo regimen used: Melphalan  Labs needed at next visit: see orders. Labs not resulted that need follow-up: None. Next Appointment scheduled: 7/7/21. BMT assessments and toxicities completed. WBC/ANC= 10.2/4.3  Prophylactic medications started Day +1 and stopped. Granix started on Day +6 and last dose given on 5/5/21. Toxicities greater than 2 : None. Patient seen by MD/NP Dr Nikita Brumfield until engraftment.   New orders received: oral potassium given for K+3.3

## 2021-06-24 ENCOUNTER — APPOINTMENT (OUTPATIENT)
Dept: INFUSION THERAPY | Age: 56
End: 2021-06-24
Payer: MEDICARE

## 2021-07-07 ENCOUNTER — HOSPITAL ENCOUNTER (OUTPATIENT)
Dept: LAB | Age: 56
Discharge: HOME | End: 2021-07-07
Payer: MEDICARE

## 2021-07-07 ENCOUNTER — PATIENT OUTREACH (OUTPATIENT)
Dept: CASE MANAGEMENT | Age: 56
End: 2021-07-07

## 2021-07-07 ENCOUNTER — HOSPITAL ENCOUNTER (OUTPATIENT)
Dept: INFUSION THERAPY | Age: 56
Discharge: HOME | End: 2021-07-07

## 2021-07-07 DIAGNOSIS — Z94.81 S/P AUTOLOGOUS BONE MARROW TRANSPLANTATION (HCC): ICD-10-CM

## 2021-07-07 LAB
ALBUMIN SERPL-MCNC: 3.2 G/DL (ref 3.5–5)
ALBUMIN/GLOB SERPL: 0.8 {RATIO} (ref 1.2–3.5)
ALP SERPL-CCNC: 109 U/L (ref 50–136)
ALT SERPL-CCNC: 12 U/L (ref 12–65)
ANION GAP SERPL CALC-SCNC: 4 MMOL/L (ref 7–16)
AST SERPL-CCNC: 10 U/L (ref 15–37)
BASOPHILS # BLD: 0.1 K/UL (ref 0–0.2)
BASOPHILS NFR BLD: 1 % (ref 0–2)
BILIRUB SERPL-MCNC: 0.2 MG/DL (ref 0.2–1.1)
BUN SERPL-MCNC: 4 MG/DL (ref 6–23)
CALCIUM SERPL-MCNC: 9.5 MG/DL (ref 8.3–10.4)
CHLORIDE SERPL-SCNC: 104 MMOL/L (ref 98–107)
CHOLEST SERPL-MCNC: 167 MG/DL
CO2 SERPL-SCNC: 29 MMOL/L (ref 21–32)
CREAT SERPL-MCNC: 0.8 MG/DL (ref 0.8–1.5)
DIFFERENTIAL METHOD BLD: ABNORMAL
EOSINOPHIL # BLD: 0.2 K/UL (ref 0–0.8)
EOSINOPHIL NFR BLD: 2 % (ref 0.5–7.8)
ERYTHROCYTE [DISTWIDTH] IN BLOOD BY AUTOMATED COUNT: 18.8 % (ref 11.9–14.6)
GLOBULIN SER CALC-MCNC: 4.1 G/DL (ref 2.3–3.5)
GLUCOSE SERPL-MCNC: 95 MG/DL (ref 65–100)
HCT VFR BLD AUTO: 37 %
HDLC SERPL-MCNC: 61 MG/DL (ref 40–60)
HDLC SERPL: 2.7 {RATIO}
HGB BLD-MCNC: 11.9 G/DL (ref 13.6–17.2)
IMM GRANULOCYTES # BLD AUTO: 0 K/UL (ref 0–0.5)
IMM GRANULOCYTES NFR BLD AUTO: 0 % (ref 0–5)
LDLC SERPL CALC-MCNC: 83.6 MG/DL
LYMPHOCYTES # BLD: 4.5 K/UL (ref 0.5–4.6)
LYMPHOCYTES NFR BLD: 49 % (ref 13–44)
MAGNESIUM SERPL-MCNC: 1.9 MG/DL (ref 1.8–2.4)
MCH RBC QN AUTO: 26.3 PG (ref 26.1–32.9)
MCHC RBC AUTO-ENTMCNC: 32.2 G/DL (ref 31.4–35)
MCV RBC AUTO: 81.7 FL (ref 79.6–97.8)
MONOCYTES # BLD: 1.4 K/UL (ref 0.1–1.3)
MONOCYTES NFR BLD: 15 % (ref 4–12)
NEUTS SEG # BLD: 3.1 K/UL (ref 1.7–8.2)
NEUTS SEG NFR BLD: 33 % (ref 43–78)
NRBC # BLD: 0 K/UL (ref 0–0.2)
PHOSPHATE SERPL-MCNC: 3.5 MG/DL (ref 2.5–4.5)
PLATELET # BLD AUTO: 400 K/UL (ref 150–450)
PMV BLD AUTO: 8.9 FL (ref 9.4–12.3)
POTASSIUM SERPL-SCNC: 3.8 MMOL/L (ref 3.5–5.1)
PROT SERPL-MCNC: 7.3 G/DL (ref 6.3–8.2)
RBC # BLD AUTO: 4.53 M/UL (ref 4.23–5.6)
SODIUM SERPL-SCNC: 137 MMOL/L (ref 136–145)
T4 FREE SERPL-MCNC: 1.2 NG/DL (ref 0.78–1.46)
TRIGL SERPL-MCNC: 112 MG/DL (ref 35–150)
TSH SERPL DL<=0.005 MIU/L-ACNC: 0.1 UIU/ML (ref 0.36–3.74)
VLDLC SERPL CALC-MCNC: 22.4 MG/DL (ref 6–23)
WBC # BLD AUTO: 9.2 K/UL (ref 4.3–11.1)

## 2021-07-07 PROCEDURE — 82784 ASSAY IGA/IGD/IGG/IGM EACH: CPT

## 2021-07-07 PROCEDURE — 84100 ASSAY OF PHOSPHORUS: CPT

## 2021-07-07 PROCEDURE — 86359 T CELLS TOTAL COUNT: CPT

## 2021-07-07 PROCEDURE — 84443 ASSAY THYROID STIM HORMONE: CPT

## 2021-07-07 PROCEDURE — 83735 ASSAY OF MAGNESIUM: CPT

## 2021-07-07 PROCEDURE — 84403 ASSAY OF TOTAL TESTOSTERONE: CPT

## 2021-07-07 PROCEDURE — 36415 COLL VENOUS BLD VENIPUNCTURE: CPT

## 2021-07-07 PROCEDURE — 83883 ASSAY NEPHELOMETRY NOT SPEC: CPT

## 2021-07-07 PROCEDURE — 84439 ASSAY OF FREE THYROXINE: CPT

## 2021-07-07 PROCEDURE — 85025 COMPLETE CBC W/AUTO DIFF WBC: CPT

## 2021-07-07 PROCEDURE — 80061 LIPID PANEL: CPT

## 2021-07-07 PROCEDURE — 86334 IMMUNOFIX E-PHORESIS SERUM: CPT

## 2021-07-07 PROCEDURE — 80053 COMPREHEN METABOLIC PANEL: CPT

## 2021-07-08 LAB
CD3 CELLS # BLD: 3741 /UL (ref 622–2402)
CD3 CELLS NFR BLD: 79.6 % (ref 57.5–86.2)
CD3+CD4+ CELLS # BLD: 1607 /UL (ref 359–1519)
CD3+CD4+ CELLS NFR BLD: 34.2 % (ref 30.8–58.5)
CD3+CD4+ CELLS/CD3+CD8+ CLL BLD: 0.84 % (ref 0.92–3.72)
CD3+CD8+ CELLS # BLD: 1922 /UL (ref 109–897)
CD3+CD8+ CELLS NFR BLD: 40.9 % (ref 12–35.5)
LYMPHOCYTES # BLD AUTO: 4.7 X10E3/UL (ref 0.7–3.1)
LYMPHOCYTES NFR BLD AUTO: 51 %
WBC # BLD AUTO: 9.2 X10E3/UL (ref 3.4–10.8)

## 2021-07-09 LAB
ALBUMIN SERPL ELPH-MCNC: 3.41 G/DL (ref 3.2–5.6)
ALBUMIN/GLOB SERPL: 1.1 {RATIO}
ALPHA1 GLOB SERPL ELPH-MCNC: 0.45 G/DL (ref 0.1–0.4)
ALPHA2 GLOB SERPL ELPH-MCNC: 1 G/DL (ref 0.4–1.2)
B-GLOBULIN SERPL QL ELPH: 1.06 G/DL (ref 0.6–1.3)
GAMMA GLOB MFR SERPL ELPH: 0.68 G/DL (ref 0.5–1.6)
IGA SERPL-MCNC: 10 MG/DL (ref 85–499)
IGG SERPL-MCNC: 742 MG/DL (ref 610–1616)
IGM SERPL-MCNC: 11 MG/DL (ref 35–242)
KAPPA LC FREE SER-MCNC: 113.8 MG/L (ref 3.3–19.4)
KAPPA LC FREE/LAMBDA FREE SER: 33.77 {RATIO} (ref 0.26–1.65)
LAMBDA LC FREE SERPL-MCNC: 3.37 MG/L (ref 5.71–26.3)
M PROTEIN SERPL ELPH-MCNC: 0.27 G/DL
PROT PATTERN SERPL ELPH-IMP: ABNORMAL
PROT PATTERN SPEC IFE-IMP: ABNORMAL
PROT SERPL-MCNC: 6.6 G/DL (ref 6.3–8.2)

## 2021-07-11 LAB
COMMENT, TESC2: ABNORMAL
TESTOST FREE SERPL-MCNC: 2.1 PG/ML (ref 7.2–24)
TESTOST SERPL-MCNC: 625 NG/DL (ref 264–916)

## 2021-07-19 ENCOUNTER — HOSPITAL ENCOUNTER (OUTPATIENT)
Dept: GENERAL RADIOLOGY | Age: 56
Discharge: HOME | End: 2021-07-19
Attending: INTERNAL MEDICINE
Payer: MEDICARE

## 2021-07-19 ENCOUNTER — HOSPITAL ENCOUNTER (OUTPATIENT)
Dept: CT IMAGING | Age: 56
Discharge: HOME | End: 2021-07-19
Attending: INTERNAL MEDICINE
Payer: MEDICARE

## 2021-07-19 ENCOUNTER — HOSPITAL ENCOUNTER (OUTPATIENT)
Dept: RESPIRATORY THERAPY | Age: 56
Discharge: HOME | End: 2021-07-19
Attending: INTERNAL MEDICINE
Payer: MEDICARE

## 2021-07-19 ENCOUNTER — HOSPITAL ENCOUNTER (OUTPATIENT)
Dept: NON INVASIVE DIAGNOSTICS | Age: 56
Discharge: HOME | End: 2021-07-19
Attending: INTERNAL MEDICINE
Payer: MEDICARE

## 2021-07-19 VITALS
TEMPERATURE: 98.6 F | WEIGHT: 166 LBS | RESPIRATION RATE: 16 BRPM | BODY MASS INDEX: 23.77 KG/M2 | OXYGEN SATURATION: 96 % | SYSTOLIC BLOOD PRESSURE: 121 MMHG | DIASTOLIC BLOOD PRESSURE: 90 MMHG | HEIGHT: 70 IN | HEART RATE: 98 BPM

## 2021-07-19 DIAGNOSIS — Z94.81 S/P AUTOLOGOUS BONE MARROW TRANSPLANTATION (HCC): ICD-10-CM

## 2021-07-19 LAB
ATRIAL RATE: 99 BPM
BASOPHILS # BLD: 0.1 K/UL (ref 0–0.2)
BASOPHILS NFR BLD: 1 % (ref 0–2)
BONE MARROW PREP & W,BMA: NORMAL
CALCULATED P AXIS, ECG09: 45 DEGREES
CALCULATED R AXIS, ECG10: 29 DEGREES
CALCULATED T AXIS, ECG11: 45 DEGREES
DIAGNOSIS, 93000: NORMAL
DIFFERENTIAL METHOD BLD: ABNORMAL
EOSINOPHIL # BLD: 0.2 K/UL (ref 0–0.8)
EOSINOPHIL NFR BLD: 1 % (ref 0.5–7.8)
ERYTHROCYTE [DISTWIDTH] IN BLOOD BY AUTOMATED COUNT: 18.6 % (ref 11.9–14.6)
HCT VFR BLD AUTO: 38.6 % (ref 41.1–50.3)
HGB BLD-MCNC: 12.1 G/DL (ref 13.6–17.2)
IMM GRANULOCYTES # BLD AUTO: 0.2 K/UL (ref 0–0.5)
IMM GRANULOCYTES NFR BLD AUTO: 2 % (ref 0–5)
LYMPHOCYTES # BLD: 4 K/UL (ref 0.5–4.6)
LYMPHOCYTES NFR BLD: 31 % (ref 13–44)
MCH RBC QN AUTO: 25.4 PG (ref 26.1–32.9)
MCHC RBC AUTO-ENTMCNC: 31.3 G/DL (ref 31.4–35)
MCV RBC AUTO: 81.1 FL (ref 79.6–97.8)
MONOCYTES # BLD: 1.1 K/UL (ref 0.1–1.3)
MONOCYTES NFR BLD: 9 % (ref 4–12)
NEUTS SEG # BLD: 7.4 K/UL (ref 1.7–8.2)
NEUTS SEG NFR BLD: 57 % (ref 43–78)
NRBC # BLD: 0 K/UL (ref 0–0.2)
P-R INTERVAL, ECG05: 170 MS
PLATELET # BLD AUTO: 406 K/UL (ref 150–450)
PMV BLD AUTO: 9.4 FL (ref 9.4–12.3)
Q-T INTERVAL, ECG07: 326 MS
QRS DURATION, ECG06: 80 MS
QTC CALCULATION (BEZET), ECG08: 418 MS
RBC # BLD AUTO: 4.76 M/UL (ref 4.23–5.6)
VENTRICULAR RATE, ECG03: 99 BPM
WBC # BLD AUTO: 13 K/UL (ref 4.3–11.1)

## 2021-07-19 PROCEDURE — 94010 BREATHING CAPACITY TEST: CPT

## 2021-07-19 PROCEDURE — 88237 TISSUE CULTURE BONE MARROW: CPT

## 2021-07-19 PROCEDURE — 93005 ELECTROCARDIOGRAM TRACING: CPT | Performed by: INTERNAL MEDICINE

## 2021-07-19 PROCEDURE — 85025 COMPLETE CBC W/AUTO DIFF WBC: CPT

## 2021-07-19 PROCEDURE — 94729 DIFFUSING CAPACITY: CPT

## 2021-07-19 PROCEDURE — 88365 INSITU HYBRIDIZATION (FISH): CPT

## 2021-07-19 PROCEDURE — 88185 FLOWCYTOMETRY/TC ADD-ON: CPT

## 2021-07-19 PROCEDURE — 88364 INSITU HYBRIDIZATION (FISH): CPT

## 2021-07-19 PROCEDURE — 88264 CHROMOSOME ANALYSIS 20-25: CPT

## 2021-07-19 PROCEDURE — 88342 IMHCHEM/IMCYTCHM 1ST ANTB: CPT

## 2021-07-19 PROCEDURE — 94726 PLETHYSMOGRAPHY LUNG VOLUMES: CPT

## 2021-07-19 PROCEDURE — 74011000250 HC RX REV CODE- 250: Performed by: RADIOLOGY

## 2021-07-19 PROCEDURE — 99152 MOD SED SAME PHYS/QHP 5/>YRS: CPT

## 2021-07-19 PROCEDURE — 88305 TISSUE EXAM BY PATHOLOGIST: CPT

## 2021-07-19 PROCEDURE — 88341 IMHCHEM/IMCYTCHM EA ADD ANTB: CPT

## 2021-07-19 PROCEDURE — 88313 SPECIAL STAINS GROUP 2: CPT

## 2021-07-19 PROCEDURE — 88184 FLOWCYTOMETRY/ TC 1 MARKER: CPT

## 2021-07-19 PROCEDURE — 74011250636 HC RX REV CODE- 250/636: Performed by: RADIOLOGY

## 2021-07-19 PROCEDURE — 88374 M/PHMTRC ALYS ISHQUANT/SEMIQ: CPT

## 2021-07-19 PROCEDURE — 88323 CONSLTJ&REPRT MATRL PREP SLD: CPT

## 2021-07-19 PROCEDURE — 88311 DECALCIFY TISSUE: CPT

## 2021-07-19 PROCEDURE — 77030028872 CT BX BONE MARROW AND ASPIRATION

## 2021-07-19 RX ORDER — LIDOCAINE HYDROCHLORIDE 20 MG/ML
20-300 INJECTION, SOLUTION INFILTRATION; PERINEURAL
Status: DISCONTINUED | OUTPATIENT
Start: 2021-07-19 | End: 2021-07-19

## 2021-07-19 RX ORDER — FENTANYL CITRATE 50 UG/ML
25-100 INJECTION, SOLUTION INTRAMUSCULAR; INTRAVENOUS
Status: DISCONTINUED | OUTPATIENT
Start: 2021-07-19 | End: 2021-07-19

## 2021-07-19 RX ORDER — MIDAZOLAM HYDROCHLORIDE 1 MG/ML
.5-2 INJECTION, SOLUTION INTRAMUSCULAR; INTRAVENOUS
Status: DISCONTINUED | OUTPATIENT
Start: 2021-07-19 | End: 2021-07-19

## 2021-07-19 RX ORDER — SODIUM CHLORIDE 9 MG/ML
25 INJECTION, SOLUTION INTRAVENOUS ONCE
Status: COMPLETED | OUTPATIENT
Start: 2021-07-19 | End: 2021-07-19

## 2021-07-19 RX ORDER — DIPHENHYDRAMINE HYDROCHLORIDE 50 MG/ML
25-50 INJECTION, SOLUTION INTRAMUSCULAR; INTRAVENOUS
Status: DISCONTINUED | OUTPATIENT
Start: 2021-07-19 | End: 2021-07-19

## 2021-07-19 RX ADMIN — DIPHENHYDRAMINE HYDROCHLORIDE 50 MG: 50 INJECTION INTRAMUSCULAR; INTRAVENOUS at 11:34

## 2021-07-19 RX ADMIN — LIDOCAINE HYDROCHLORIDE 80 MG: 20 INJECTION, SOLUTION INFILTRATION; PERINEURAL at 12:01

## 2021-07-19 RX ADMIN — MIDAZOLAM 0.5 MG: 1 INJECTION INTRAMUSCULAR; INTRAVENOUS at 11:58

## 2021-07-19 RX ADMIN — FENTANYL CITRATE 25 MCG: 50 INJECTION, SOLUTION INTRAMUSCULAR; INTRAVENOUS at 12:00

## 2021-07-19 RX ADMIN — SODIUM CHLORIDE 25 ML/HR: 900 INJECTION, SOLUTION INTRAVENOUS at 11:35

## 2021-07-19 RX ADMIN — FENTANYL CITRATE 50 MCG: 50 INJECTION, SOLUTION INTRAMUSCULAR; INTRAVENOUS at 11:49

## 2021-07-19 RX ADMIN — MIDAZOLAM 0.5 MG: 1 INJECTION INTRAMUSCULAR; INTRAVENOUS at 12:00

## 2021-07-19 RX ADMIN — MIDAZOLAM 1 MG: 1 INJECTION INTRAMUSCULAR; INTRAVENOUS at 11:49

## 2021-07-19 RX ADMIN — FENTANYL CITRATE 25 MCG: 50 INJECTION, SOLUTION INTRAMUSCULAR; INTRAVENOUS at 11:57

## 2021-07-19 NOTE — PROGRESS NOTES
Prep complete. Patient ready for procedure. Blood obtained at this time and sent to lab for ordered tests.

## 2021-07-19 NOTE — PROCEDURES
Department of Interventional Radiology  [phone]        Interventional Radiology Brief Procedure Note    Patient: Allan Jarrett MRN: [id number]  SSN: [id number]    YOB: 1965  Age: 48 y.o. Sex: male      Date of Procedure: 7/19/2021    Pre-Procedure Diagnosis: biopsy    Post-Procedure Diagnosis: SAME    Procedure(s): Image Guided Biopsy    Brief Description of Procedure: CT guided bone marrow biopsy    Performed By: Chris Mason PA-C     Assistants: None    Anesthesia:Moderate Sedation per ILIANA Rangel MD    Estimated Blood Loss: Less than 10ml    Specimens:  aspirate and core    Implants:  None    Findings: no post biopsy bleeding    Complications: None    Recommendations: routine wound care     Follow Up: prn    Signed By: Chris Mason PA-C     July 19, 2021

## 2021-07-19 NOTE — DISCHARGE INSTRUCTIONS
[address]  Department of Interventional Radiology  [facility]  [phone] Office  [phone] Fax    BIOPSY DISCHARGE INSTRUCTIONS    General Instructions:     A biopsy is the removal of a small piece of tissue for microscopic examination or testing. Healthy tissue can be obtained for the purpose of tissue-type matching for transplants. Unhealthy tissues are more commonly biopsied to diagnose disease. Lung Biopsy:     A needle lung biopsy is performed when there is a mass discovered in the lung area. The most serious risk is infection, bleeding, and pneumothorax (a collapsed lung). Signs of pneumothorax include shortness of breath, rapid heart rate, and blueness of the skin. If any of these occur, call 911. Liver Biopsy: This test helps detect cancer, infections, and the cause of an enlargement of the liver or elevated liver enzymes. It also helps to diagnose a number of liver diseases. The pain associated with the procedure may be felt in the shoulder. The risks include internal bleeding, pneumothorax, and injury to the surrounding organs. Renal Biopsy: This procedure is sometimes done to evaluate a transplanted kidney. It is also used to evaluate unexplained decrease in kidney function, blood, or protein in the urine. You may see bright red blood in the urine the first 24 hours after the test. If the bleeding lasts longer, you need to call your doctor. There is a risk of infection and bleeding into the muscle, which may cause soreness. Spinal Biopsy: This test is sometimes done in conjunction with other procedures. Your back will be sore, as we are taking a small sample of bone, which is slightly more difficult to sample than tissue. General Biopsy:     A mass can grow in any area of the body, and we are taking a specimen as ordered by your doctor. The risks are the same.  They include bleeding, pain, and infection. Home Care Instructions: You may resume your regular diet and medication regimen. Do not drink alcohol, drive, or make any important legal decisions in the next 24 hours. Do not lift anything heavier than a gallon of milk until the soreness goes away. You may use over the counter acetaminophen or ibuprofen for the soreness. You may apply an ice pack to the affected area for 20-30 minutes at time for the first 24 hours. After that, you may apply a heat pack. Call If: You should call your Physician and/or the Radiology Nurse if you have any questions or concerns about the biopsy site. Call if you should have increased pain, fever, redness, drainage, or bleeding more than a small spot on the bandage. Follow-Up Instructions: Please see your ordering doctor as he/she has requested. To Reach Us: If you have any questions about your procedure, please call the Interventional Radiology department at [phone]. After business hours (5pm) and weekends, call the answering service at [phone] and ask for the Radiologist on call to be paged. Si tiene Preguntas acerca del procedimiento, por favor llame al departamento de Radiología Intervencional al [phone]. Después de horas de oficina (5 pm) y los fines de [place], llamar al Juanita Dunn al [phone] y pregunte por el [facility]. Interventional Radiology General Nurse Discharge    After general anesthesia or intravenous sedation, for 24 hours or while taking prescription Narcotics:  · Limit your activities  · Do not drive and operate hazardous machinery  · Do not make important personal or business decisions  · Do  not drink alcoholic beverages  · If you have not urinated within 8 hours after discharge, please contact your surgeon on call. * Please give a list of your current medications to your Primary Care Provider.   * Please update this list whenever your medications are discontinued, doses are changed, or new medications (including over-the-counter products) are added. * Please carry medication information at all times in case of emergency situations. These are general instructions for a healthy lifestyle:    No smoking/ No tobacco products/ Avoid exposure to second hand smoke  Surgeon General's Warning:  Quitting smoking now greatly reduces serious risk to your health. Obesity, smoking, and sedentary lifestyle greatly increases your risk for illness  A healthy diet, regular physical exercise & weight monitoring are important for maintaining a healthy lifestyle    You may be retaining fluid if you have a history of heart failure or if you experience any of the following symptoms:  Weight gain of 3 pounds or more overnight or 5 pounds in a week, increased swelling in our hands or feet or shortness of breath while lying flat in bed. Please call your doctor as soon as you notice any of these symptoms; do not wait until your next office visit. Recognize signs and symptoms of STROKE:  F-face looks uneven    A-arms unable to move or move unevenly    S-speech slurred or non-existent    T-time-call 911 as soon as signs and symptoms begin-DO NOT go       Back to bed or wait to see if you get better-TIME IS BRAIN.       Patient Signature:  Date: 7/19/2021  Discharging Nurse: Adithya Worley RN

## 2021-07-19 NOTE — PROGRESS NOTES
TRANSFER - OUT REPORT:    Verbal report given to Baljinder Arellano RN(name) on Vicky Smoker  being transferred to IR recovery (unit) for routine progression of care       Report consisted of patients Situation, Background, Assessment and   Recommendations(SBAR). Information from the following report(s) SBAR, Procedure Summary and MAR was reviewed with the receiving nurse. Opportunity for questions and clarification was provided. Conscious Sedation:   100 Mcg of Fentanyl administered  2 Mg of Versed administered    Pt tolerated procedure well.      Visit Vitals  /72   Pulse 97   Temp 98.6 °F (37 °C)   Resp 16   Ht 5' 10\" (1.778 m)   Wt 75.3 kg (166 lb)   SpO2 98%   BMI 23.82 kg/m²     Past Medical History:   Diagnosis Date    Cancer ([facility] [address] dx 1/2021    multiple myeloma----    GERD (gastroesophageal reflux disease)     controlled with med    Headache     CLUSTER PER G' FRIEND     Peripheral IV 07/19/21 Right Antecubital (Active)   Site Assessment Clean, dry, & intact 07/19/21 1105   Phlebitis Assessment 0 07/19/21 1105   Infiltration Assessment 0 07/19/21 1105   Dressing Status Clean, dry, & intact 07/19/21 1105   Hub Color/Line Status Pink 07/19/21 1105

## 2021-07-19 NOTE — H&P
Department of Interventional Radiology  [phone]    History and Physical    Patient:  Alysia Alcantara MRN:  [id number]  SSN:  [id number]    YOB: 1965  Age:  48 y.o. Sex:  male      Primary Care Provider:  Priyank Colbert NP  Referring Physician:  Ashley Almaraz MD    Subjective:     Chief Complaint: biopsy    History of the Present Illness: The patient is a 48 y.o. male with multiple myeloma who presents for venous chest port placement. NPO. Past Medical History:   Diagnosis Date    Cancer [facility]) dx 1/2021    multiple myeloma----    GERD (gastroesophageal reflux disease)     controlled with med    Headache     CLUSTER PER G' FRIEND     Past Surgical History:   Procedure Laterality Date    HX KNEE REPLACEMENT Left 06/2000    HX KYPHOPLASTY  08/2020    HX OTHER SURGICAL      bone marrow biopsy    HX TRANSPLANT      Autologous Stem cell transplant    IR INSERT TUNL CVC W/O PORT OVER 5 YR  3/31/2021    IR REMOVE TUNL CVAD W/O PORT / PUMP  6/17/2021    IR US GUIDE NDL PLACE  3/29/2021        Review of Systems:    Pertinent items are noted in the History of Present Illness. Prior to Admission medications    Medication Sig Start Date End Date Taking? Authorizing Provider   vitamin k 100 mcg tablet Take 100 mcg by mouth daily. Yes Provider, Historical   morphine CR (MS CONTIN) 60 mg CR tablet Take 1 Tablet by mouth every twelve (12) hours for 30 days. Max Daily Amount: 120 mg. 7/9/21 8/8/21  Ashley Almaraz MD   oxyCODONE-acetaminophen (PERCOCET 10)  mg per tablet Take 1 Tablet by mouth every four (4) hours as needed for Pain for up to 30 days. Max Daily Amount: 6 Tablets. 7/9/21 8/8/21  Ashley Almaraz MD   cyclobenzaprine (FLEXERIL) 10 mg tablet TAKE 1 TABLET BY MOUTH EVERY DAY AS NEEDED FOR MUSCLE SPASMS 6/17/21   Ashley Almaraz MD   zolpidem [facility], [facility]. [facility]) 5 mg tablet Take 1 Tablet by mouth nightly as needed for Sleep.  Max Daily Amount: 5 mg. 6/3/21   Rachell Bran Samir Mccloud MD   trimethoprim-sulfamethoxazole (Bactrim DS) 160-800 mg per tablet Take 1 Tablet by mouth every Monday, Wednesday, Friday. 5/28/21   Haylie Rutherford MD   calcium carbonate (OS-STACY) 500 mg calcium (1,250 mg) tablet Take 1 Tab by mouth three (3) times daily. 5/7/21   Haylie Rutherford MD   magic mouthwash Nancylee Víctor) susp Take 5 mL by mouth Before breakfast, lunch, and dinner. Magic Mouthwash  Maalox  Lidocaine 2% viscous  Diphenhydramine oral solution     Pharmacy to mix equal portions of ingredients to a total volume as indicated in the dispense amount  Patient not taking: Reported on 5/27/2021 5/3/21   Elaina Navarro NP   prochlorperazine (Compazine) 10 mg tablet Take 1 Tab by mouth every six (6) hours as needed for Nausea or Vomiting. Patient not taking: Reported on 7/7/2021 4/30/21   Haylie Rutherford MD   ondansetron hcl [facility]) 8 mg tablet Take 1 Tab by mouth every eight (8) hours as needed for Nausea or Vomiting. Patient not taking: Reported on 7/7/2021 4/22/21   Haylie Rutherford MD   acyclovir (ZOVIRAX) 400 mg tablet Take 1 Tab by mouth two (2) times a day. 4/21/21   Haylie Rutherford MD   cyanocobalamin (Vitamin B-12) 100 mcg tablet Take 100 mcg by mouth daily. Provider, Historical   melatonin 3 mg tablet TAKE 1 TABLET BY MOUTH EVERY NIGHT AS NEEDED FOR SLEEP 1/31/21   Provider, Historical   potassium chloride (K-DUR, KLOR-CON) 20 mEq tablet Take 1 Tab by mouth two (2) times a day. 2/12/21   Gladys Chilel NP   cholecalciferol (VITAMIN D3) (1000 Units /25 mcg) tablet Take 1,000 Units by mouth daily. 9/18/20 9/18/21  Provider, Historical   LORazepam (ATIVAN) 0.5 mg tablet Take 0.5 mg by mouth daily. 10/20/20   Provider, Historical   montelukast (SINGULAIR) 10 mg tablet nightly. 12/17/20   Provider, Historical   pantoprazole (PROTONIX) 40 mg tablet daily. 12/5/20   Provider, Historical   aspirin (ASPIRIN) 325 mg tablet Take 1 Tab by mouth daily.  Per patient instructed to hold for 7 days--pt stopped 3/20/21 9/30/20   Provider, Historical        No Known Allergies    History reviewed. No pertinent family history.   Social History     Tobacco Use    Smoking status: Former Smoker     Packs/day: 1.00     Years: 4.00     Pack years: 4.00     Quit date:      Years since quittin.5    Smokeless tobacco: Never Used   Substance Use Topics    Alcohol use: Not Currently        Objective:       Physical Examination:    Vitals:    21 1103   BP: 119/82   Pulse: 100   Resp: 18   Temp: 98.6 °F (37 °C)   SpO2: 93%   Weight: 75.3 kg (166 lb)   Height: 5' 10\" (1.778 m)       Pain Assessment  Pain Intensity 1: 6 (21 1101)  Pain Location 1: Back  Pain Intervention(s) 1: Repositioned         HEART: regular rate and rhythm  LUNG: clear to auscultation bilaterally  ABDOMEN: normal findings: soft, non-tender  EXTREMITIES: warm, no edema    Laboratory:     Lab Results   Component Value Date/Time    Sodium 137 2021 02:32 PM    Sodium 140 2021 10:38 AM    Potassium 3.8 2021 02:32 PM    Potassium 3.3 (L) 2021 10:38 AM    Chloride 104 2021 02:32 PM    Chloride 106 2021 10:38 AM    CO2 29 2021 02:32 PM    CO2 29 2021 10:38 AM    Anion gap 4 (L) 2021 02:32 PM    Anion gap 5 (L) 2021 10:38 AM    Glucose 95 2021 02:32 PM    Glucose 93 2021 10:38 AM    BUN 4 (L) 2021 02:32 PM    BUN 6 2021 10:38 AM    Creatinine 0.80 2021 02:32 PM    Creatinine 0.70 (L) 2021 10:38 AM    GFR est AA >60 2021 02:32 PM    GFR est AA >60 2021 10:38 AM    GFR est non-AA >60 2021 02:32 PM    GFR est non-AA >60 2021 10:38 AM    Calcium 9.5 2021 02:32 PM    Calcium 8.7 2021 10:38 AM    Magnesium 1.9 2021 02:32 PM    Magnesium 2.0 2021 10:38 AM    Phosphorus 3.5 2021 02:32 PM    Phosphorus 2.9 2021 10:38 AM    Albumin 3.2 (L) 2021 02:32 PM    Albumin 3.1 (L) 2021 10:38 AM Protein, total 7.3 07/07/2021 02:32 PM    Protein, total 6.6 07/07/2021 02:32 PM    Globulin 4.1 (H) 07/07/2021 02:32 PM    Globulin 3.7 (H) 06/23/2021 10:38 AM    A-G Ratio 0.8 (L) 07/07/2021 02:32 PM    A-G Ratio 1.1 07/07/2021 02:32 PM    ALT (SGPT) 12 07/07/2021 02:32 PM    ALT (SGPT) 14 06/23/2021 10:38 AM     Lab Results   Component Value Date/Time    WBC 13.0 (H) 07/19/2021 11:07 AM    WBC 9.2 07/07/2021 02:32 PM    WBC 9.2 07/07/2021 02:32 PM    HGB 12.1 (L) 07/19/2021 11:07 AM    HGB 11.9 (L) 07/07/2021 02:32 PM    HCT 38.6 (L) 07/19/2021 11:07 AM    HCT 37.0 07/07/2021 02:32 PM    PLATELET 653 75/07/4107 11:07 AM    PLATELET 921 55/29/7532 02:32 PM     Lab Results   Component Value Date/Time    aPTT 30.0 03/28/2021 08:10 AM    Prothrombin time 13.2 03/28/2021 08:10 AM    INR 1.0 03/28/2021 08:10 AM       Assessment:     Multiple myeloma    Hospital Problems  Date Reviewed: 5/7/2021    None          Plan:     Planned Procedure:  Bone marrow biopsy    Risks, benefits, and alternatives reviewed with patient and he agrees to proceed with the procedure.       Signed By: Dulce Stokes PA-C     July 19, 2021

## 2021-07-22 LAB
FLOW CYTOMETRY, FBTC1: NORMAL
SPECIMEN SOURCE: NORMAL
TEST ORDERED:: NORMAL

## 2021-08-04 ENCOUNTER — PATIENT OUTREACH (OUTPATIENT)
Dept: CASE MANAGEMENT | Age: 56
End: 2021-08-04

## 2021-08-04 ENCOUNTER — HOSPITAL ENCOUNTER (OUTPATIENT)
Dept: LAB | Age: 56
Discharge: HOME | End: 2021-08-04
Payer: MEDICARE

## 2021-08-04 DIAGNOSIS — Z94.81 S/P AUTOLOGOUS BONE MARROW TRANSPLANTATION (HCC): ICD-10-CM

## 2021-08-04 DIAGNOSIS — C90.00 MULTIPLE MYELOMA NOT HAVING ACHIEVED REMISSION (HCC): ICD-10-CM

## 2021-08-04 LAB
ALBUMIN SERPL-MCNC: 3.2 G/DL (ref 3.5–5)
ALBUMIN/GLOB SERPL: 0.9 {RATIO} (ref 1.2–3.5)
ALP SERPL-CCNC: 80 U/L (ref 50–136)
ALT SERPL-CCNC: 10 U/L (ref 12–65)
ANION GAP SERPL CALC-SCNC: 4 MMOL/L (ref 7–16)
AST SERPL-CCNC: 14 U/L (ref 15–37)
BASOPHILS # BLD: 0.1 K/UL (ref 0–0.2)
BASOPHILS NFR BLD: 1 % (ref 0–2)
BILIRUB SERPL-MCNC: 0.4 MG/DL (ref 0.2–1.1)
BUN SERPL-MCNC: 3 MG/DL (ref 6–23)
CALCIUM SERPL-MCNC: 8.7 MG/DL (ref 8.3–10.4)
CHLORIDE SERPL-SCNC: 108 MMOL/L (ref 98–107)
CHOLEST SERPL-MCNC: 160 MG/DL
CO2 SERPL-SCNC: 27 MMOL/L (ref 21–32)
CREAT SERPL-MCNC: 0.7 MG/DL (ref 0.8–1.5)
DIFFERENTIAL METHOD BLD: ABNORMAL
EOSINOPHIL # BLD: 0.3 K/UL (ref 0–0.8)
EOSINOPHIL NFR BLD: 2 % (ref 0.5–7.8)
ERYTHROCYTE [DISTWIDTH] IN BLOOD BY AUTOMATED COUNT: 18.1 % (ref 11.9–14.6)
GLOBULIN SER CALC-MCNC: 3.5 G/DL (ref 2.3–3.5)
GLUCOSE SERPL-MCNC: 97 MG/DL (ref 65–100)
HCT VFR BLD AUTO: 35.9 %
HDLC SERPL-MCNC: 62 MG/DL (ref 40–60)
HDLC SERPL: 2.6 {RATIO}
HGB BLD-MCNC: 11.4 G/DL (ref 13.6–17.2)
IMM GRANULOCYTES # BLD AUTO: 0 K/UL (ref 0–0.5)
IMM GRANULOCYTES NFR BLD AUTO: 0 % (ref 0–5)
LDLC SERPL CALC-MCNC: 78.4 MG/DL
LYMPHOCYTES # BLD: 4.4 K/UL (ref 0.5–4.6)
LYMPHOCYTES NFR BLD: 37 % (ref 13–44)
MAGNESIUM SERPL-MCNC: 1.8 MG/DL (ref 1.8–2.4)
MCH RBC QN AUTO: 25.4 PG (ref 26.1–32.9)
MCHC RBC AUTO-ENTMCNC: 31.8 G/DL (ref 31.4–35)
MCV RBC AUTO: 80 FL (ref 79.6–97.8)
MONOCYTES # BLD: 1 K/UL (ref 0.1–1.3)
MONOCYTES NFR BLD: 9 % (ref 4–12)
NEUTS SEG # BLD: 6.1 K/UL (ref 1.7–8.2)
NEUTS SEG NFR BLD: 51 % (ref 43–78)
NRBC # BLD: 0 K/UL (ref 0–0.2)
PHOSPHATE SERPL-MCNC: 2.2 MG/DL (ref 2.5–4.5)
PLATELET # BLD AUTO: 305 K/UL (ref 150–450)
PMV BLD AUTO: 9.6 FL (ref 9.4–12.3)
POTASSIUM SERPL-SCNC: 3.1 MMOL/L (ref 3.5–5.1)
PROT SERPL-MCNC: 6.7 G/DL (ref 6.3–8.2)
RBC # BLD AUTO: 4.49 M/UL (ref 4.23–5.6)
SODIUM SERPL-SCNC: 139 MMOL/L (ref 136–145)
T4 FREE SERPL-MCNC: 1.1 NG/DL (ref 0.78–1.46)
TRIGL SERPL-MCNC: 98 MG/DL (ref 35–150)
TSH SERPL DL<=0.005 MIU/L-ACNC: 0.65 UIU/ML (ref 0.36–3.74)
VLDLC SERPL CALC-MCNC: 19.6 MG/DL (ref 6–23)
WBC # BLD AUTO: 11.9 K/UL (ref 4.3–11.1)

## 2021-08-04 PROCEDURE — 80061 LIPID PANEL: CPT

## 2021-08-04 PROCEDURE — 80053 COMPREHEN METABOLIC PANEL: CPT

## 2021-08-04 PROCEDURE — 85025 COMPLETE CBC W/AUTO DIFF WBC: CPT

## 2021-08-04 PROCEDURE — 84100 ASSAY OF PHOSPHORUS: CPT

## 2021-08-04 PROCEDURE — 86359 T CELLS TOTAL COUNT: CPT

## 2021-08-04 PROCEDURE — 84439 ASSAY OF FREE THYROXINE: CPT

## 2021-08-04 PROCEDURE — 86334 IMMUNOFIX E-PHORESIS SERUM: CPT

## 2021-08-04 PROCEDURE — 82784 ASSAY IGA/IGD/IGG/IGM EACH: CPT

## 2021-08-04 PROCEDURE — 84443 ASSAY THYROID STIM HORMONE: CPT

## 2021-08-04 PROCEDURE — 84403 ASSAY OF TOTAL TESTOSTERONE: CPT

## 2021-08-04 PROCEDURE — 36415 COLL VENOUS BLD VENIPUNCTURE: CPT

## 2021-08-04 PROCEDURE — 83883 ASSAY NEPHELOMETRY NOT SPEC: CPT

## 2021-08-04 PROCEDURE — 83735 ASSAY OF MAGNESIUM: CPT

## 2021-08-04 NOTE — PROGRESS NOTES
8/4 Pt came in for review of his D100 studies. His myeloma labs are looking better than they have previously. Instructed pt to follow up with Dr. Bindu Griffin and get started on low dose revlimid. We will see the pt back in October for 6 months s/p transplant.

## 2021-08-05 LAB
CD3 CELLS # BLD: 3447 /UL (ref 622–2402)
CD3 CELLS NFR BLD: 76.6 % (ref 57.5–86.2)
CD3+CD4+ CELLS # BLD: 1490 /UL (ref 359–1519)
CD3+CD4+ CELLS NFR BLD: 33.1 % (ref 30.8–58.5)
CD3+CD4+ CELLS/CD3+CD8+ CLL BLD: 0.79 % (ref 0.92–3.72)
CD3+CD8+ CELLS # BLD: 1886 /UL (ref 109–897)
CD3+CD8+ CELLS NFR BLD: 41.9 % (ref 12–35.5)
KAPPA LC FREE SER-MCNC: 123.86 MG/L (ref 3.3–19.4)
KAPPA LC FREE/LAMBDA FREE SER: 45.37 {RATIO} (ref 0.26–1.65)
LAMBDA LC FREE SERPL-MCNC: 2.73 MG/L (ref 5.71–26.3)
LYMPHOCYTES # BLD AUTO: 4.5 X10E3/UL (ref 0.7–3.1)
LYMPHOCYTES NFR BLD AUTO: 38 %
WBC # BLD AUTO: 11.9 X10E3/UL (ref 3.4–10.8)

## 2021-08-06 LAB
ALBUMIN SERPL ELPH-MCNC: 3.35 G/DL (ref 3.2–5.6)
ALBUMIN/GLOB SERPL: 1.3 {RATIO}
ALPHA1 GLOB SERPL ELPH-MCNC: 0.42 G/DL (ref 0.1–0.4)
ALPHA2 GLOB SERPL ELPH-MCNC: 0.8 G/DL (ref 0.4–1.2)
B-GLOBULIN SERPL QL ELPH: 0.82 G/DL (ref 0.6–1.3)
GAMMA GLOB MFR SERPL ELPH: 0.61 G/DL (ref 0.5–1.6)
IGA SERPL-MCNC: 8 MG/DL (ref 85–499)
IGG SERPL-MCNC: 651 MG/DL (ref 610–1616)
IGM SERPL-MCNC: 16 MG/DL (ref 35–242)
M PROTEIN SERPL ELPH-MCNC: 0.34 G/DL
PROT PATTERN SERPL ELPH-IMP: ABNORMAL
PROT PATTERN SPEC IFE-IMP: ABNORMAL
PROT SERPL-MCNC: 6 G/DL (ref 6.3–8.2)

## 2021-08-08 LAB
COMMENT, TESC2: ABNORMAL
TESTOST FREE SERPL-MCNC: 2.2 PG/ML (ref 7.2–24)
TESTOST SERPL-MCNC: 445 NG/DL (ref 264–916)

## 2021-11-02 ENCOUNTER — PATIENT OUTREACH (OUTPATIENT)
Dept: CASE MANAGEMENT | Age: 56
End: 2021-11-02

## 2021-11-02 ENCOUNTER — HOSPITAL ENCOUNTER (OUTPATIENT)
Dept: LAB | Age: 56
Discharge: HOME | End: 2021-11-02
Payer: MEDICARE

## 2021-11-02 DIAGNOSIS — Z94.81 S/P AUTOLOGOUS BONE MARROW TRANSPLANTATION (HCC): ICD-10-CM

## 2021-11-02 LAB
ALBUMIN SERPL-MCNC: 3 G/DL (ref 3.5–5)
ALBUMIN/GLOB SERPL: 0.8 {RATIO} (ref 1.2–3.5)
ALP SERPL-CCNC: 87 U/L (ref 50–136)
ALT SERPL-CCNC: 19 U/L (ref 12–65)
ANION GAP SERPL CALC-SCNC: 6 MMOL/L (ref 7–16)
AST SERPL-CCNC: 5 U/L (ref 15–37)
BASOPHILS # BLD: 0.1 K/UL (ref 0–0.2)
BASOPHILS NFR BLD: 1 % (ref 0–2)
BILIRUB SERPL-MCNC: 0.2 MG/DL (ref 0.2–1.1)
BUN SERPL-MCNC: 9 MG/DL (ref 6–23)
CALCIUM SERPL-MCNC: 9.3 MG/DL (ref 8.3–10.4)
CHLORIDE SERPL-SCNC: 106 MMOL/L (ref 98–107)
CO2 SERPL-SCNC: 26 MMOL/L (ref 21–32)
CREAT SERPL-MCNC: 0.7 MG/DL (ref 0.8–1.5)
DIFFERENTIAL METHOD BLD: ABNORMAL
EOSINOPHIL # BLD: 0.1 K/UL (ref 0–0.8)
EOSINOPHIL NFR BLD: 1 % (ref 0.5–7.8)
ERYTHROCYTE [DISTWIDTH] IN BLOOD BY AUTOMATED COUNT: 21.1 % (ref 11.9–14.6)
GLOBULIN SER CALC-MCNC: 4 G/DL (ref 2.3–3.5)
GLUCOSE SERPL-MCNC: 88 MG/DL (ref 65–100)
HCT VFR BLD AUTO: 36.4 %
HGB BLD-MCNC: 11.6 G/DL (ref 13.6–17.2)
IMM GRANULOCYTES # BLD AUTO: 0.1 K/UL (ref 0–0.5)
IMM GRANULOCYTES NFR BLD AUTO: 1 % (ref 0–5)
LYMPHOCYTES # BLD: 3.2 K/UL (ref 0.5–4.6)
LYMPHOCYTES NFR BLD: 30 % (ref 13–44)
MAGNESIUM SERPL-MCNC: 2 MG/DL (ref 1.8–2.4)
MCH RBC QN AUTO: 24.3 PG (ref 26.1–32.9)
MCHC RBC AUTO-ENTMCNC: 31.9 G/DL (ref 31.4–35)
MCV RBC AUTO: 76.2 FL (ref 79.6–97.8)
MONOCYTES # BLD: 1.5 K/UL (ref 0.1–1.3)
MONOCYTES NFR BLD: 14 % (ref 4–12)
NEUTS SEG # BLD: 5.9 K/UL (ref 1.7–8.2)
NEUTS SEG NFR BLD: 55 % (ref 43–78)
NRBC # BLD: 0 K/UL (ref 0–0.2)
PLATELET # BLD AUTO: 419 K/UL (ref 150–450)
PMV BLD AUTO: 9.4 FL (ref 9.4–12.3)
POTASSIUM SERPL-SCNC: 4 MMOL/L (ref 3.5–5.1)
PROT SERPL-MCNC: 7 G/DL (ref 6.3–8.2)
RBC # BLD AUTO: 4.78 M/UL (ref 4.23–5.6)
SODIUM SERPL-SCNC: 138 MMOL/L (ref 136–145)
WBC # BLD AUTO: 10.8 K/UL (ref 4.3–11.1)

## 2021-11-02 PROCEDURE — 85025 COMPLETE CBC W/AUTO DIFF WBC: CPT

## 2021-11-02 PROCEDURE — 83883 ASSAY NEPHELOMETRY NOT SPEC: CPT

## 2021-11-02 PROCEDURE — 86359 T CELLS TOTAL COUNT: CPT

## 2021-11-02 PROCEDURE — 82784 ASSAY IGA/IGD/IGG/IGM EACH: CPT

## 2021-11-02 PROCEDURE — 80053 COMPREHEN METABOLIC PANEL: CPT

## 2021-11-02 PROCEDURE — 36415 COLL VENOUS BLD VENIPUNCTURE: CPT

## 2021-11-02 PROCEDURE — 83735 ASSAY OF MAGNESIUM: CPT

## 2021-11-02 PROCEDURE — 82232 ASSAY OF BETA-2 PROTEIN: CPT

## 2021-11-02 PROCEDURE — 86334 IMMUNOFIX E-PHORESIS SERUM: CPT

## 2021-11-02 NOTE — PROGRESS NOTES
11/2 Pt came in for a 6month post-transplant follow up. Pt is taking 5mg revlimid from Dr. Hussein Zapien.    We will see him back in 6months and then he will get his 1yr post transplant immunizations

## 2021-11-03 LAB
CD3 CELLS # BLD: 2849 /UL (ref 622–2402)
CD3 CELLS NFR BLD: 81.4 % (ref 57.5–86.2)
CD3+CD4+ CELLS # BLD: 1365 /UL (ref 359–1519)
CD3+CD4+ CELLS NFR BLD: 39 % (ref 30.8–58.5)
CD3+CD4+ CELLS/CD3+CD8+ CLL BLD: 0.85 % (ref 0.92–3.72)
CD3+CD8+ CELLS # BLD: 1600 /UL (ref 109–897)
CD3+CD8+ CELLS NFR BLD: 45.7 % (ref 12–35.5)
LYMPHOCYTES # BLD AUTO: 3.5 X10E3/UL (ref 0.7–3.1)
LYMPHOCYTES NFR BLD AUTO: 33 %
WBC # BLD AUTO: 10.8 X10E3/UL (ref 3.4–10.8)

## 2021-11-04 LAB
B2 MICROGLOB SERPL-MCNC: 2 MG/L (ref 0.8–2.34)
KAPPA LC FREE SER-MCNC: 125.3 MG/L (ref 3.3–19.4)
KAPPA LC FREE/LAMBDA FREE SER: 19.49 {RATIO} (ref 0.26–1.65)
LAMBDA LC FREE SERPL-MCNC: 6.43 MG/L (ref 5.71–26.3)

## 2021-11-05 LAB
ALBUMIN SERPL ELPH-MCNC: 3.36 G/DL (ref 3.2–5.6)
ALBUMIN/GLOB SERPL: 1.1 {RATIO}
ALPHA1 GLOB SERPL ELPH-MCNC: 0.4 G/DL (ref 0.1–0.4)
ALPHA2 GLOB SERPL ELPH-MCNC: 0.87 G/DL (ref 0.4–1.2)
B-GLOBULIN SERPL QL ELPH: 0.97 G/DL (ref 0.6–1.3)
GAMMA GLOB MFR SERPL ELPH: 0.7 G/DL (ref 0.5–1.6)
IGA SERPL-MCNC: 24 MG/DL (ref 85–499)
IGG SERPL-MCNC: 711 MG/DL (ref 610–1616)
IGM SERPL-MCNC: 37 MG/DL (ref 35–242)
M PROTEIN SERPL ELPH-MCNC: 0.39 G/DL
PROT PATTERN SERPL ELPH-IMP: ABNORMAL
PROT PATTERN SPEC IFE-IMP: ABNORMAL
PROT SERPL-MCNC: 6.3 G/DL (ref 6.3–8.2)

## 2022-03-18 PROBLEM — J84.9 ILD (INTERSTITIAL LUNG DISEASE) (HCC): Status: ACTIVE | Noted: 2021-02-23

## 2022-03-19 PROBLEM — Z94.81 S/P AUTOLOGOUS BONE MARROW TRANSPLANTATION (HCC): Status: ACTIVE | Noted: 2021-05-27

## 2022-03-19 PROBLEM — J96.11 CHRONIC RESPIRATORY FAILURE WITH HYPOXIA (HCC): Status: ACTIVE | Noted: 2021-02-23

## 2022-03-20 PROBLEM — C90.00 MULTIPLE MYELOMA NOT HAVING ACHIEVED REMISSION (HCC): Status: ACTIVE | Noted: 2021-02-16

## 2022-04-12 ENCOUNTER — HOSPITAL ENCOUNTER (OUTPATIENT)
Dept: RESPIRATORY THERAPY | Age: 57
Discharge: HOME | End: 2022-04-12
Attending: INTERNAL MEDICINE
Payer: MEDICARE

## 2022-04-12 ENCOUNTER — HOSPITAL ENCOUNTER (OUTPATIENT)
Dept: NON INVASIVE DIAGNOSTICS | Age: 57
Discharge: HOME | End: 2022-04-12
Attending: INTERNAL MEDICINE
Payer: MEDICARE

## 2022-04-12 ENCOUNTER — HOSPITAL ENCOUNTER (OUTPATIENT)
Dept: GENERAL RADIOLOGY | Age: 57
Discharge: HOME | End: 2022-04-12
Attending: INTERNAL MEDICINE
Payer: MEDICARE

## 2022-04-12 DIAGNOSIS — Z94.81 S/P AUTOLOGOUS BONE MARROW TRANSPLANTATION (HCC): ICD-10-CM

## 2022-04-12 DIAGNOSIS — R09.89 OTHER SPECIFIED SYMPTOMS AND SIGNS INVOLVING THE CIRCULATORY AND RESPIRATORY SYSTEMS: ICD-10-CM

## 2022-04-12 LAB
ATRIAL RATE: 93 BPM
CALCULATED P AXIS, ECG09: 65 DEGREES
CALCULATED R AXIS, ECG10: 66 DEGREES
CALCULATED T AXIS, ECG11: 49 DEGREES
DIAGNOSIS, 93000: NORMAL
P-R INTERVAL, ECG05: 196 MS
Q-T INTERVAL, ECG07: 330 MS
QRS DURATION, ECG06: 86 MS
QTC CALCULATION (BEZET), ECG08: 410 MS
VENTRICULAR RATE, ECG03: 93 BPM

## 2022-04-12 PROCEDURE — 94726 PLETHYSMOGRAPHY LUNG VOLUMES: CPT

## 2022-04-12 PROCEDURE — 93005 ELECTROCARDIOGRAM TRACING: CPT | Performed by: INTERNAL MEDICINE

## 2022-04-12 PROCEDURE — 94729 DIFFUSING CAPACITY: CPT

## 2022-04-12 PROCEDURE — 94010 BREATHING CAPACITY TEST: CPT

## 2022-04-12 PROCEDURE — 77074 RADEX OSSEOUS SURVEY LMTD: CPT

## 2022-04-13 ENCOUNTER — HOSPITAL ENCOUNTER (OUTPATIENT)
Dept: NON INVASIVE DIAGNOSTICS | Age: 57
Discharge: HOME | End: 2022-04-13
Attending: INTERNAL MEDICINE
Payer: MEDICARE

## 2022-04-13 ENCOUNTER — HOSPITAL ENCOUNTER (OUTPATIENT)
Dept: CT IMAGING | Age: 57
Discharge: HOME | End: 2022-04-13
Attending: INTERNAL MEDICINE
Payer: MEDICARE

## 2022-04-13 VITALS
WEIGHT: 175 LBS | HEIGHT: 71 IN | RESPIRATION RATE: 16 BRPM | SYSTOLIC BLOOD PRESSURE: 97 MMHG | DIASTOLIC BLOOD PRESSURE: 73 MMHG | BODY MASS INDEX: 24.5 KG/M2 | OXYGEN SATURATION: 92 % | HEART RATE: 87 BPM | TEMPERATURE: 97.1 F

## 2022-04-13 DIAGNOSIS — Z94.81 S/P AUTOLOGOUS BONE MARROW TRANSPLANTATION (HCC): ICD-10-CM

## 2022-04-13 LAB
BASOPHILS # BLD: 0.2 K/UL (ref 0–0.2)
BASOPHILS NFR BLD: 3 % (ref 0–2)
BONE MARROW PREP & W,BMA: NORMAL
DIFFERENTIAL METHOD BLD: ABNORMAL
ECHO AO ASC DIAM: 3.1 CM
ECHO AO ASCENDING AORTA INDEX: 1.56 CM/M2
ECHO AV AREA PEAK VELOCITY: 3 CM2
ECHO AV AREA VTI: 2.9 CM2
ECHO AV AREA/BSA PEAK VELOCITY: 1.5 CM2/M2
ECHO AV AREA/BSA VTI: 1.5 CM2/M2
ECHO AV CUSP MM: 1.9 CM
ECHO AV MEAN GRADIENT: 3 MMHG
ECHO AV MEAN VELOCITY: 0.7 M/S
ECHO AV PEAK GRADIENT: 6 MMHG
ECHO AV PEAK VELOCITY: 1.2 M/S
ECHO AV VELOCITY RATIO: 0.92
ECHO AV VTI: 20.3 CM
ECHO EST RA PRESSURE: 3 MMHG
ECHO LA AREA 2C: 15.7 CM2
ECHO LA AREA 4C: 15.3 CM2
ECHO LA DIAMETER INDEX: 1.46 CM/M2
ECHO LA DIAMETER: 2.9 CM
ECHO LA MAJOR AXIS: 5.3 CM
ECHO LA MINOR AXIS: 5.5 CM
ECHO LA VOL BP: 36 ML (ref 18–58)
ECHO LA VOL/BSA BIPLANE: 18 ML/M2 (ref 16–34)
ECHO LV E' LATERAL VELOCITY: 11 CM/S
ECHO LV E' SEPTAL VELOCITY: 6 CM/S
ECHO LV EDV 3D: 101 ML
ECHO LV EDV A4C: 62 ML
ECHO LV EDV INDEX 3D: 51 ML/M2
ECHO LV EDV INDEX A4C: 31 ML/M2
ECHO LV EJECTION FRACTION 3D: 60 %
ECHO LV EJECTION FRACTION A4C: 61 %
ECHO LV ESV 3D: 41 ML
ECHO LV ESV A4C: 24 ML
ECHO LV ESV INDEX 3D: 21 ML/M2
ECHO LV ESV INDEX A4C: 12 ML/M2
ECHO LV FRACTIONAL SHORTENING: 36 % (ref 28–44)
ECHO LV GLOBAL LONGITUDINAL STRAIN (GLS): -18.8 %
ECHO LV GLOBAL LONGITUDINAL STRAIN (GLS): -20 %
ECHO LV GLOBAL LONGITUDINAL STRAIN (GLS): -21.1 %
ECHO LV GLOBAL LONGITUDINAL STRAIN (GLS): -24.5 %
ECHO LV INTERNAL DIMENSION DIASTOLE INDEX: 1.66 CM/M2
ECHO LV INTERNAL DIMENSION DIASTOLIC: 3.3 CM (ref 4.2–5.9)
ECHO LV INTERNAL DIMENSION SYSTOLIC INDEX: 1.06 CM/M2
ECHO LV INTERNAL DIMENSION SYSTOLIC: 2.1 CM
ECHO LV IVSD: 1.4 CM (ref 0.6–1)
ECHO LV MASS 2D: 159.5 G (ref 88–224)
ECHO LV MASS 3D INDEX: 61.8 G/M2
ECHO LV MASS 3D: 123 G
ECHO LV MASS INDEX 2D: 80.2 G/M2 (ref 49–115)
ECHO LV POSTERIOR WALL DIASTOLIC: 1.4 CM (ref 0.6–1)
ECHO LV RELATIVE WALL THICKNESS RATIO: 0.85
ECHO LVOT AREA: 3.5 CM2
ECHO LVOT AV VTI INDEX: 0.83
ECHO LVOT DIAM: 2.1 CM
ECHO LVOT MEAN GRADIENT: 2 MMHG
ECHO LVOT PEAK GRADIENT: 4 MMHG
ECHO LVOT PEAK VELOCITY: 1.1 M/S
ECHO LVOT STROKE VOLUME INDEX: 29.4 ML/M2
ECHO LVOT SV: 58.5 ML
ECHO LVOT VTI: 16.9 CM
ECHO MV A VELOCITY: 0.81 M/S
ECHO MV AREA VTI: 2.3 CM2
ECHO MV E DECELERATION TIME (DT): 232 MS
ECHO MV E VELOCITY: 0.6 M/S
ECHO MV E/A RATIO: 0.74
ECHO MV E/E' LATERAL: 5.45
ECHO MV E/E' RATIO (AVERAGED): 7.73
ECHO MV E/E' SEPTAL: 10
ECHO MV LVOT VTI INDEX: 1.5
ECHO MV MAX VELOCITY: 1.1 M/S
ECHO MV MEAN GRADIENT: 1 MMHG
ECHO MV MEAN VELOCITY: 0.6 M/S
ECHO MV PEAK GRADIENT: 5 MMHG
ECHO MV VTI: 25.3 CM
ECHO RV BASAL DIMENSION: 2.9 CM
ECHO RV INTERNAL DIMENSION: 2.9 CM
ECHO RV TAPSE: 2.1 CM (ref 1.7–?)
EOSINOPHIL # BLD: 0.4 K/UL (ref 0–0.8)
EOSINOPHIL NFR BLD: 5 % (ref 0.5–7.8)
ERYTHROCYTE [DISTWIDTH] IN BLOOD BY AUTOMATED COUNT: 18.9 % (ref 11.9–14.6)
HCT VFR BLD AUTO: 42.9 % (ref 41.1–50.3)
HGB BLD-MCNC: 13.5 G/DL (ref 13.6–17.2)
IMM GRANULOCYTES # BLD AUTO: 0 K/UL (ref 0–0.5)
IMM GRANULOCYTES NFR BLD AUTO: 0 % (ref 0–5)
LYMPHOCYTES # BLD: 4 K/UL (ref 0.5–4.6)
LYMPHOCYTES NFR BLD: 47 % (ref 13–44)
MCH RBC QN AUTO: 24.6 PG (ref 26.1–32.9)
MCHC RBC AUTO-ENTMCNC: 31.5 G/DL (ref 31.4–35)
MCV RBC AUTO: 78.3 FL (ref 79.6–97.8)
MONOCYTES # BLD: 0.8 K/UL (ref 0.1–1.3)
MONOCYTES NFR BLD: 10 % (ref 4–12)
NEUTS SEG # BLD: 2.9 K/UL (ref 1.7–8.2)
NEUTS SEG NFR BLD: 35 % (ref 43–78)
NRBC # BLD: 0 K/UL (ref 0–0.2)
PLATELET # BLD AUTO: 276 K/UL (ref 150–450)
PMV BLD AUTO: 9.5 FL (ref 9.4–12.3)
RBC # BLD AUTO: 5.48 M/UL (ref 4.23–5.6)
WBC # BLD AUTO: 8.3 K/UL (ref 4.3–11.1)

## 2022-04-13 PROCEDURE — 88184 FLOWCYTOMETRY/ TC 1 MARKER: CPT

## 2022-04-13 PROCEDURE — 74011000250 HC RX REV CODE- 250: Performed by: RADIOLOGY

## 2022-04-13 PROCEDURE — 88311 DECALCIFY TISSUE: CPT

## 2022-04-13 PROCEDURE — 88185 FLOWCYTOMETRY/TC ADD-ON: CPT

## 2022-04-13 PROCEDURE — 88313 SPECIAL STAINS GROUP 2: CPT

## 2022-04-13 PROCEDURE — 74011250636 HC RX REV CODE- 250/636: Performed by: RADIOLOGY

## 2022-04-13 PROCEDURE — 93306 TTE W/DOPPLER COMPLETE: CPT

## 2022-04-13 PROCEDURE — 88305 TISSUE EXAM BY PATHOLOGIST: CPT

## 2022-04-13 PROCEDURE — 36415 COLL VENOUS BLD VENIPUNCTURE: CPT

## 2022-04-13 PROCEDURE — 88374 M/PHMTRC ALYS ISHQUANT/SEMIQ: CPT

## 2022-04-13 PROCEDURE — 77012 CT SCAN FOR NEEDLE BIOPSY: CPT

## 2022-04-13 PROCEDURE — 85025 COMPLETE CBC W/AUTO DIFF WBC: CPT

## 2022-04-13 PROCEDURE — 99152 MOD SED SAME PHYS/QHP 5/>YRS: CPT

## 2022-04-13 RX ORDER — FENTANYL CITRATE 50 UG/ML
25-100 INJECTION, SOLUTION INTRAMUSCULAR; INTRAVENOUS
Status: DISCONTINUED | OUTPATIENT
Start: 2022-04-13 | End: 2022-04-13

## 2022-04-13 RX ORDER — SODIUM CHLORIDE 9 MG/ML
500 INJECTION, SOLUTION INTRAVENOUS CONTINUOUS
Status: DISCONTINUED | OUTPATIENT
Start: 2022-04-13 | End: 2022-04-13

## 2022-04-13 RX ORDER — MIDAZOLAM HYDROCHLORIDE 1 MG/ML
.5-2 INJECTION, SOLUTION INTRAMUSCULAR; INTRAVENOUS
Status: DISCONTINUED | OUTPATIENT
Start: 2022-04-13 | End: 2022-04-13

## 2022-04-13 RX ORDER — LIDOCAINE HYDROCHLORIDE 20 MG/ML
20-300 INJECTION, SOLUTION INFILTRATION; PERINEURAL
Status: DISCONTINUED | OUTPATIENT
Start: 2022-04-13 | End: 2022-04-13

## 2022-04-13 RX ADMIN — FENTANYL CITRATE 50 MCG: 50 INJECTION, SOLUTION INTRAMUSCULAR; INTRAVENOUS at 10:48

## 2022-04-13 RX ADMIN — FENTANYL CITRATE 50 MCG: 50 INJECTION, SOLUTION INTRAMUSCULAR; INTRAVENOUS at 10:36

## 2022-04-13 RX ADMIN — FENTANYL CITRATE 50 MCG: 50 INJECTION, SOLUTION INTRAMUSCULAR; INTRAVENOUS at 10:45

## 2022-04-13 RX ADMIN — MIDAZOLAM 1 MG: 1 INJECTION INTRAMUSCULAR; INTRAVENOUS at 10:48

## 2022-04-13 RX ADMIN — LIDOCAINE HYDROCHLORIDE 60 MG: 20 INJECTION, SOLUTION INFILTRATION; PERINEURAL at 10:47

## 2022-04-13 RX ADMIN — MIDAZOLAM 1 MG: 1 INJECTION INTRAMUSCULAR; INTRAVENOUS at 10:36

## 2022-04-13 RX ADMIN — MIDAZOLAM 1 MG: 1 INJECTION INTRAMUSCULAR; INTRAVENOUS at 10:45

## 2022-04-13 RX ADMIN — SODIUM CHLORIDE 500 ML: 900 INJECTION, SOLUTION INTRAVENOUS at 10:36

## 2022-04-13 NOTE — H&P
History and Physical    Patient: Selene Richards MRN: [id number]  SSN: [id number]    YOB: 1965  Age: 55 y.o. Sex: male      Subjective:      Selene Richards is a 55 y.o. male who has multiple myeloma. Needs repeat bone marrow biopsy. NPO. Past Medical History:   Diagnosis Date    Cancer [facility]) dx 2021    multiple myeloma----    GERD (gastroesophageal reflux disease)     controlled with med    Headache     CLUSTER PER G' FRIEND     Past Surgical History:   Procedure Laterality Date    HX KNEE REPLACEMENT Left 2000    HX KYPHOPLASTY  2020    HX OTHER SURGICAL      bone marrow biopsy    HX TRANSPLANT      Autologous Stem cell transplant    IR INSERT TUNL CVC W/O PORT OVER 5 YR  3/31/2021    IR REMOVE TUNL CVAD W/O PORT / PUMP  2021    IR US GUIDE NDL PLACE  3/29/2021      History reviewed. No pertinent family history. Social History     Tobacco Use    Smoking status: Former Smoker     Packs/day: 1.00     Years: 4.00     Pack years: 4.00     Quit date:      Years since quittin.2    Smokeless tobacco: Never Used   Substance Use Topics    Alcohol use: Not Currently      Prior to Admission medications    Medication Sig Start Date End Date Taking? Authorizing Provider   pantoprazole (PROTONIX) 40 mg tablet TAKE 1 TABLET BY MOUTH DAILY 22   Jo Macedo MD   montelukast (SINGULAIR) 10 mg tablet Take 1 Tablet by mouth nightly. 22   Jo Macedo MD   trimethoprim-sulfamethoxazole (BACTRIM DS, SEPTRA DS) 160-800 mg per tablet TAKE 1 TABLET BY MOUTH EVERY MONDAY, [facility] AND 22   Jo Macedo MD   cyclobenzaprine (FLEXERIL) 10 mg tablet TAKE 1 TABLET BY MOUTH EVERY DAY AS NEEDED FOR MUSCLE SPASMS 21   Jo Macedo MD   pregabalin (LYRICA) 25 mg capsule Take 1 Capsule by mouth two (2) times a day.  Max Daily Amount: 50 mg. 21   Jo Macedo MD   zolpidem [facility]) 5 mg tablet Take 1 Tablet by mouth nightly as needed for Sleep. Max Daily Amount: 5 mg. 6/3/21   Viktor Bautista MD   calcium carbonate (OS-STACY) 500 mg calcium (1,250 mg) tablet Take 1 Tab by mouth three (3) times daily. 5/7/21   Viktor Bautista MD   magic mouthwash Yahir Andreaon) susp Take 5 mL by mouth Before breakfast, lunch, and dinner. Magic Mouthwash  Maalox  Lidocaine 2% viscous  Diphenhydramine oral solution     Pharmacy to mix equal portions of ingredients to a total volume as indicated in the dispense amount  Patient not taking: Reported on 5/27/2021 5/3/21   Mana Arenas NP   prochlorperazine (Compazine) 10 mg tablet Take 1 Tab by mouth every six (6) hours as needed for Nausea or Vomiting. 4/30/21   Viktor Bautista MD   ondansetron hcl [facility]) 8 mg tablet Take 1 Tab by mouth every eight (8) hours as needed for Nausea or Vomiting. 4/22/21   Viktor Bautista MD   acyclovir (ZOVIRAX) 400 mg tablet Take 1 Tab by mouth two (2) times a day. 4/21/21   Viktor Bautista MD   vitamin k 100 mcg tablet Take 100 mcg by mouth daily. Provider, Historical   cyanocobalamin (Vitamin B-12) 100 mcg tablet Take 100 mcg by mouth daily. Provider, Historical   melatonin 3 mg tablet TAKE 1 TABLET BY MOUTH EVERY NIGHT AS NEEDED FOR SLEEP  Patient not taking: Reported on 8/4/2021 1/31/21   Provider, Historical   potassium chloride (K-DUR, KLOR-CON) 20 mEq tablet Take 1 Tab by mouth two (2) times a day. 2/12/21   Tylor Spears NP   LORazepam (ATIVAN) 0.5 mg tablet Take 0.5 mg by mouth daily. 10/20/20   Provider, Historical   aspirin (ASPIRIN) 325 mg tablet Take 1 Tab by mouth daily. Per patient instructed to hold for 7 days--pt stopped 3/20/21 9/30/20   Provider, Historical        No Known Allergies    Review of Systems:  Pertinent items are noted in the History of Present Illness.     Objective:     Vitals:    04/13/22 0950 04/13/22 0951   BP: 113/75    Pulse: 96    Resp: 18    Temp: 97.1 °F (36.2 °C)    SpO2: 93%    Weight:  79.4 kg (175 lb)   Height:  5' 11\" (1.803 m) Physical Exam:  LUNG: clear to auscultation bilaterally  HEART: regular rate and rhythm    Assessment:     Hospital Problems  Date Reviewed: 2021    None          Plan:     Ct guided bone marrow biopsy. Moderate sedation.     Signed By: Denver Natal, MD     2022

## 2022-04-13 NOTE — DISCHARGE INSTRUCTIONS
Y[address]i [address]  Department of Interventional Radiology  [facility]  [phone] Office  [phone] Fax    BIOPSY DISCHARGE INSTRUCTIONS    General Instructions:     A biopsy is the removal of a small piece of tissue for microscopic examination or testing. Healthy tissue can be obtained for the purpose of tissue-type matching for transplants. Unhealthy tissues are more commonly biopsied to diagnose disease. Lung Biopsy:     A needle lung biopsy is performed when there is a mass discovered in the lung area. The most serious risk is infection, bleeding, and pneumothorax (a collapsed lung). Signs of pneumothorax include shortness of breath, rapid heart rate, and blueness of the skin. If any of these occur, call 911. Liver Biopsy: This test helps detect cancer, infections, and the cause of an enlargement of the liver or elevated liver enzymes. It also helps to diagnose a number of liver diseases. The pain associated with the procedure may be felt in the shoulder. The risks include internal bleeding, pneumothorax, and injury to the surrounding organs. Renal Biopsy: This procedure is sometimes done to evaluate a transplanted kidney. It is also used to evaluate unexplained decrease in kidney function, blood, or protein in the urine. You may see bright red blood in the urine the first 24 hours after the test. If the bleeding lasts longer, you need to call your doctor. There is a risk of infection and bleeding into the muscle, which may cause soreness. Spinal Biopsy: This test is sometimes done in conjunction with other procedures. Your back will be sore, as we are taking a small sample of bone, which is slightly more difficult to sample than tissue. General Biopsy:     A mass can grow in any area of the body, and we are taking a specimen as ordered by your doctor. The risks are the same.  They include bleeding, pain, and infection. Home Care Instructions: You may resume your regular diet and medication regimen. Do not drink alcohol, drive, or make any important legal decisions in the next 24 hours. Do not lift anything heavier than a gallon of milk until the soreness goes away. You may use over the counter acetaminophen or ibuprofen for the soreness. You may apply an ice pack to the affected area for 20-30 minutes at time for the first 24 hours. After that, you may apply a heat pack. Call If: You should call your Physician and/or the Radiology Nurse if you have any questions or concerns about the biopsy site. Call if you should have increased pain, fever, redness, drainage, or bleeding more than a small spot on the bandage. Follow-Up Instructions: Please see your ordering doctor as he/she has requested. To Reach Us: If you have any questions about your procedure, please call the Interventional Radiology department at [phone]. After business hours (5pm) and weekends, call the answering service at [phone] and ask for the Radiologist on call to be paged. Si tiene Preguntas acerca del procedimiento, por favor llame al departamento de Radiología Intervencional al [phone]. Después de horas de oficina (5 pm) y los fines de [place], llamar al Theresa Dunn al [phone] y pregunte por el Radiologo de [facility]. Interventional Radiology General Nurse Discharge    After general anesthesia or intravenous sedation, for 24 hours or while taking prescription Narcotics:  · Limit your activities  · Do not drive and operate hazardous machinery  · Do not make important personal or business decisions  · Do  not drink alcoholic beverages  · If you have not urinated within 8 hours after discharge, please contact your surgeon on call. * Please give a list of your current medications to your Primary Care Provider.   * Please update this list whenever your medications are discontinued, doses are     changed, or new medications (including over-the-counter products) are added. * Please carry medication information at all times in case of emergency situations. These are general instructions for a healthy lifestyle:    No smoking/ No tobacco products/ Avoid exposure to second hand smoke  Surgeon General's Warning:  Quitting smoking now greatly reduces serious risk to your health. Obesity, smoking, and sedentary lifestyle greatly increases your risk for illness  A healthy diet, regular physical exercise & weight monitoring are important for maintaining a healthy lifestyle    You may be retaining fluid if you have a history of heart failure or if you experience any of the following symptoms:  Weight gain of 3 pounds or more overnight or 5 pounds in a week, increased swelling in our hands or feet or shortness of breath while lying flat in bed. Please call your doctor as soon as you notice any of these symptoms; do not wait until your next office visit. Recognize signs and symptoms of STROKE:  F-face looks uneven    A-arms unable to move or move unevenly    S-speech slurred or non-existent    T-time-call 911 as soon as signs and symptoms begin-DO NOT go       Back to bed or wait to see if you get better-TIME IS BRAIN.

## 2022-04-13 NOTE — PROGRESS NOTES
TRANSFER - OUT REPORT:    Verbal report given to MAYO Gonzales on Don Bolds  being transferred to IR Recovery for routine post - op       Report consisted of patients Situation, Background, Assessment and   Recommendations(SBAR). Information from the following report(s) SBAR, Procedure Summary and MAR was reviewed with the receiving nurse. Opportunity for questions and clarification was provided. Conscious Sedation:   150 Mcg of Fentanyl administered  3 Mg of Versed administered    Pt tolerated procedure well.      Visit Vitals  /63   Pulse 96   Temp 97.1 °F (36.2 °C)   Resp 16   Ht 5' 11\" (1.803 m)   Wt 79.4 kg (175 lb)   SpO2 97%   BMI 24.41 kg/m²     Past Medical History:   Diagnosis Date    Cancer ([facility] [address] dx 1/2021    multiple myeloma----    GERD (gastroesophageal reflux disease)     controlled with med    Headache     CLUSTER PER G' FRIEND     Peripheral IV 04/13/22 Right Antecubital (Active)   Site Assessment Clean, dry, & intact 04/13/22 1002   Phlebitis Assessment 0 04/13/22 1002   Infiltration Assessment 0 04/13/22 1002   Dressing Status Clean, dry, & intact 04/13/22 1002   Hub Color/Line Status Pink 04/13/22 1002

## 2022-04-13 NOTE — PROGRESS NOTES
Pt to CT via stretcher with RN.       IR Nurse Pre-Procedure Checklist Part 2          Consent signed: Yes    H&P complete:  Yes    Antibiotics: Not applicable    Airway/Mallampati Done: Yes    Shaved: Not applicable    Pregnancy Form:Not applicable    Patient Position: Yes    MD Side: Yes     Biopsy Worksheet: Yes    Specimen Medium: Yes

## 2022-04-13 NOTE — PROCEDURES
Department of Interventional Radiology  [phone]        Interventional Radiology Brief Procedure Note    Patient: Tracie Orozco MRN: [id number]  SSN: [id number]    YOB: 1965  Age: 55 y.o.   Sex: male      Date of Procedure: 4/13/2022    Pre-Procedure Diagnosis: MM    Post-Procedure Diagnosis: SAME    Procedure(s): Image Guided Biopsy    Brief Description of Procedure: as above    Performed By: Jose Elias Laurent MD     Assistants: None    Anesthesia:Moderate Sedation    Estimated Blood Loss: Less than 10ml    Specimens:  Pathology    Implants:  None    Findings: osteopenic bone    Complications: None    Recommendations: routine post care     Follow Up: as needed    Signed By: Jose Elias Laurent MD     April 13, 2022

## 2022-04-21 LAB
FLOW CYTOMETRY, FBTC1: NORMAL
SPECIMEN SOURCE: NORMAL
TEST ORDERED:: NORMAL

## 2022-04-27 ENCOUNTER — HOSPITAL ENCOUNTER (OUTPATIENT)
Dept: LAB | Age: 57
Discharge: HOME | End: 2022-04-27
Payer: MEDICARE

## 2022-04-27 ENCOUNTER — HOSPITAL ENCOUNTER (OUTPATIENT)
Dept: INFUSION THERAPY | Age: 57
Discharge: HOME | End: 2022-04-27
Payer: MEDICARE

## 2022-04-27 DIAGNOSIS — Z94.81 S/P AUTOLOGOUS BONE MARROW TRANSPLANTATION (HCC): ICD-10-CM

## 2022-04-27 DIAGNOSIS — Z94.81 S/P AUTOLOGOUS BONE MARROW TRANSPLANTATION (HCC): Primary | ICD-10-CM

## 2022-04-27 DIAGNOSIS — C90.00 MULTIPLE MYELOMA NOT HAVING ACHIEVED REMISSION (HCC): ICD-10-CM

## 2022-04-27 DIAGNOSIS — E55.9 VITAMIN D DEFICIENCY: ICD-10-CM

## 2022-04-27 LAB
25(OH)D3 SERPL-MCNC: 57.4 NG/ML (ref 30–100)
ALBUMIN SERPL-MCNC: 3.8 G/DL (ref 3.5–5)
ALBUMIN/GLOB SERPL: 1.1 {RATIO} (ref 1.2–3.5)
ALP SERPL-CCNC: 73 U/L (ref 50–136)
ALT SERPL-CCNC: 16 U/L (ref 12–65)
ANION GAP SERPL CALC-SCNC: 6 MMOL/L (ref 7–16)
AST SERPL-CCNC: 8 U/L (ref 15–37)
BASOPHILS # BLD: 0.1 K/UL (ref 0–0.2)
BASOPHILS NFR BLD: 2 % (ref 0–2)
BILIRUB SERPL-MCNC: 0.3 MG/DL (ref 0.2–1.1)
BUN SERPL-MCNC: 8 MG/DL (ref 6–23)
CALCIUM SERPL-MCNC: 9.9 MG/DL (ref 8.3–10.4)
CHLORIDE SERPL-SCNC: 106 MMOL/L (ref 98–107)
CHOLEST SERPL-MCNC: 153 MG/DL
CO2 SERPL-SCNC: 26 MMOL/L (ref 21–32)
CREAT SERPL-MCNC: 1.2 MG/DL (ref 0.8–1.5)
DIFFERENTIAL METHOD BLD: ABNORMAL
EOSINOPHIL # BLD: 0 K/UL (ref 0–0.8)
EOSINOPHIL NFR BLD: 0 % (ref 0.5–7.8)
ERYTHROCYTE [DISTWIDTH] IN BLOOD BY AUTOMATED COUNT: 18.9 % (ref 11.9–14.6)
GLOBULIN SER CALC-MCNC: 3.6 G/DL (ref 2.3–3.5)
GLUCOSE SERPL-MCNC: 99 MG/DL (ref 65–100)
HCT VFR BLD AUTO: 43.3 %
HDLC SERPL-MCNC: 62 MG/DL (ref 40–60)
HDLC SERPL: 2.5 {RATIO}
HGB BLD-MCNC: 13.9 G/DL (ref 13.6–17.2)
IMM GRANULOCYTES # BLD AUTO: 0 K/UL (ref 0–0.5)
IMM GRANULOCYTES NFR BLD AUTO: 0 % (ref 0–5)
LDLC SERPL CALC-MCNC: 77 MG/DL
LYMPHOCYTES # BLD: 2.3 K/UL (ref 0.5–4.6)
LYMPHOCYTES NFR BLD: 34 % (ref 13–44)
MAGNESIUM SERPL-MCNC: 2 MG/DL (ref 1.8–2.4)
MCH RBC QN AUTO: 25 PG (ref 26.1–32.9)
MCHC RBC AUTO-ENTMCNC: 32.1 G/DL (ref 31.4–35)
MCV RBC AUTO: 78 FL (ref 79.6–97.8)
MONOCYTES # BLD: 0.8 K/UL (ref 0.1–1.3)
MONOCYTES NFR BLD: 11 % (ref 4–12)
NEUTS SEG # BLD: 3.7 K/UL (ref 1.7–8.2)
NEUTS SEG NFR BLD: 53 % (ref 43–78)
NRBC # BLD: 0 K/UL (ref 0–0.2)
PLATELET # BLD AUTO: 330 K/UL (ref 150–450)
PMV BLD AUTO: 9.5 FL (ref 9.4–12.3)
POTASSIUM SERPL-SCNC: 3.7 MMOL/L (ref 3.5–5.1)
PROT SERPL-MCNC: 7.4 G/DL (ref 6.3–8.2)
RBC # BLD AUTO: 5.55 M/UL (ref 4.23–5.6)
SODIUM SERPL-SCNC: 138 MMOL/L (ref 136–145)
T4 FREE SERPL-MCNC: 1 NG/DL (ref 0.78–1.4)
TRIGL SERPL-MCNC: 70 MG/DL (ref 35–150)
TSH SERPL DL<=0.005 MIU/L-ACNC: 0.46 UIU/ML (ref 0.36–3)
VLDLC SERPL CALC-MCNC: 14 MG/DL (ref 6–23)
WBC # BLD AUTO: 6.9 K/UL (ref 4.3–11.1)

## 2022-04-27 PROCEDURE — 90471 IMMUNIZATION ADMIN: CPT

## 2022-04-27 PROCEDURE — 82306 VITAMIN D 25 HYDROXY: CPT

## 2022-04-27 PROCEDURE — 80053 COMPREHEN METABOLIC PANEL: CPT

## 2022-04-27 PROCEDURE — 36415 COLL VENOUS BLD VENIPUNCTURE: CPT

## 2022-04-27 PROCEDURE — 90670 PCV13 VACCINE IM: CPT | Performed by: INTERNAL MEDICINE

## 2022-04-27 PROCEDURE — 74011000636 HC RX REV CODE- 636: Performed by: INTERNAL MEDICINE

## 2022-04-27 PROCEDURE — 86359 T CELLS TOTAL COUNT: CPT

## 2022-04-27 PROCEDURE — 74011250636 HC RX REV CODE- 250/636: Performed by: INTERNAL MEDICINE

## 2022-04-27 PROCEDURE — 85025 COMPLETE CBC W/AUTO DIFF WBC: CPT

## 2022-04-27 PROCEDURE — 90734 MENACWYD/MENACWYCRM VACC IM: CPT | Performed by: INTERNAL MEDICINE

## 2022-04-27 PROCEDURE — 83735 ASSAY OF MAGNESIUM: CPT

## 2022-04-27 PROCEDURE — 84439 ASSAY OF FREE THYROXINE: CPT

## 2022-04-27 PROCEDURE — 90700 DTAP VACCINE < 7 YRS IM: CPT | Performed by: INTERNAL MEDICINE

## 2022-04-27 PROCEDURE — 82784 ASSAY IGA/IGD/IGG/IGM EACH: CPT

## 2022-04-27 PROCEDURE — 90648 HIB PRP-T VACCINE 4 DOSE IM: CPT | Performed by: INTERNAL MEDICINE

## 2022-04-27 PROCEDURE — 82232 ASSAY OF BETA-2 PROTEIN: CPT

## 2022-04-27 PROCEDURE — 90713 POLIOVIRUS IPV SC/IM: CPT | Performed by: INTERNAL MEDICINE

## 2022-04-27 PROCEDURE — 83521 IG LIGHT CHAINS FREE EACH: CPT

## 2022-04-27 PROCEDURE — 90746 HEPB VACCINE 3 DOSE ADULT IM: CPT | Performed by: INTERNAL MEDICINE

## 2022-04-27 PROCEDURE — 90472 IMMUNIZATION ADMIN EACH ADD: CPT

## 2022-04-27 PROCEDURE — 80061 LIPID PANEL: CPT

## 2022-04-27 PROCEDURE — 84403 ASSAY OF TOTAL TESTOSTERONE: CPT

## 2022-04-27 PROCEDURE — 84443 ASSAY THYROID STIM HORMONE: CPT

## 2022-04-27 RX ADMIN — DIPHTHERIA AND TETANUS TOXOIDS AND ACELLULAR PERTUSSIS VACCINE ADSORBED 0.5 ML: 10; 25; 25; 25; 8 SUSPENSION INTRAMUSCULAR at 15:57

## 2022-04-27 RX ADMIN — POLIOVIRUS TYPE 1 ANTIGEN (FORMALDEHYDE INACTIVATED), POLIOVIRUS TYPE 2 ANTIGEN (FORMALDEHYDE INACTIVATED), AND POLIOVIRUS TYPE 3 ANTIGEN (FORMALDEHYDE INACTIVATED) 0.5 ML: 40; 8; 32 INJECTION, SUSPENSION INTRAMUSCULAR at 15:56

## 2022-04-27 RX ADMIN — Medication 0.5 ML: at 15:57

## 2022-04-27 RX ADMIN — PNEUMOCOCCAL 13-VALENT CONJUGATE VACCINE 0.5 ML: 2.2; 2.2; 2.2; 2.2; 2.2; 4.4; 2.2; 2.2; 2.2; 2.2; 2.2; 2.2; 2.2 INJECTION, SUSPENSION INTRAMUSCULAR at 15:59

## 2022-04-27 RX ADMIN — HEPATITIS B VACCINE (RECOMBINANT) 20 MCG: 20 INJECTION, SUSPENSION INTRAMUSCULAR at 15:56

## 2022-04-27 NOTE — PROGRESS NOTES
[facility] Post transplant vaccines  completed. Provided education on vaccines   Patient instructed to report any side affects to ordering provider.   Patient tolerated well   Any issues or concerns during appointment: none  Patient aware of next infusion appointment on 6/22 at 10:30am  Discharged via wheelchair

## 2022-04-28 LAB
B2 MICROGLOB SERPL-MCNC: 1.7 MG/L (ref 0.6–2.4)
CD3 CELLS # BLD: 1539 /UL (ref 622–2402)
CD3 CELLS NFR BLD: 66.9 % (ref 57.5–86.2)
CD3+CD4+ CELLS # BLD: 651 /UL (ref 359–1519)
CD3+CD4+ CELLS NFR BLD: 28.3 % (ref 30.8–58.5)
CD3+CD4+ CELLS/CD3+CD8+ CLL BLD: 0.68 % (ref 0.92–3.72)
CD3+CD8+ CELLS # BLD: 955 /UL (ref 109–897)
CD3+CD8+ CELLS NFR BLD: 41.5 % (ref 12–35.5)
IGA SERPL-MCNC: 42 MG/DL (ref 90–386)
IGG SERPL-MCNC: 830 MG/DL (ref 603–1613)
IGM SERPL-MCNC: 14 MG/DL (ref 20–172)
KAPPA LC FREE SER-MCNC: 82.9 MG/L (ref 3.3–19.4)
KAPPA LC FREE/LAMBDA FREE SER: 9.99 {RATIO} (ref 0.26–1.65)
LAMBDA LC FREE SERPL-MCNC: 8.3 MG/L (ref 5.7–26.3)
LYMPHOCYTES # BLD AUTO: 2.3 X10E3/UL (ref 0.7–3.1)
LYMPHOCYTES NFR BLD AUTO: 35 %
WBC # BLD AUTO: 6.7 X10E3/UL (ref 3.4–10.8)

## 2022-04-29 LAB
TESTOST FREE SERPL-MCNC: 2.3 PG/ML (ref 7.2–24)
TESTOST SERPL-MCNC: 438 NG/DL (ref 264–916)

## 2022-05-02 LAB
ALBUMIN SERPL ELPH-MCNC: 3.7 G/DL (ref 2.9–4.4)
ALBUMIN/GLOB SERPL: 1.2 {RATIO} (ref 0.7–1.7)
ALPHA1 GLOB SERPL ELPH-MCNC: 0.4 G/DL (ref 0–0.4)
ALPHA2 GLOB SERPL ELPH-MCNC: 0.9 G/DL (ref 0.4–1)
B-GLOBULIN SERPL ELPH-MCNC: 1 G/DL (ref 0.7–1.3)
GAMMA GLOB SERPL ELPH-MCNC: 0.8 G/DL (ref 0.4–1.8)
GLOBULIN SER-MCNC: 3.1 G/DL (ref 2.2–3.9)
IGA SERPL-MCNC: 42 MG/DL (ref 90–386)
IGG SERPL-MCNC: 830 MG/DL (ref 603–1613)
IGM SERPL-MCNC: 15 MG/DL (ref 20–172)
INTERPRETATION SERPL IEP-IMP: ABNORMAL
M PROTEIN SERPL ELPH-MCNC: 0.3 G/DL
PROT SERPL-MCNC: 6.8 G/DL (ref 6–8.5)

## 2022-06-21 DIAGNOSIS — Z94.81 S/P AUTOLOGOUS BONE MARROW TRANSPLANTATION (HCC): Primary | ICD-10-CM

## 2022-06-22 ENCOUNTER — CLINICAL DOCUMENTATION (OUTPATIENT)
Dept: CASE MANAGEMENT | Age: 57
End: 2022-06-22

## 2022-06-22 ENCOUNTER — OFFICE VISIT (OUTPATIENT)
Dept: ONCOLOGY | Age: 57
End: 2022-06-22
Payer: MEDICARE

## 2022-06-22 ENCOUNTER — HOSPITAL ENCOUNTER (OUTPATIENT)
Dept: INFUSION THERAPY | Age: 57
Discharge: HOME | End: 2022-06-22
Payer: MEDICARE

## 2022-06-22 ENCOUNTER — HOSPITAL ENCOUNTER (OUTPATIENT)
Dept: LAB | Age: 57
Discharge: HOME | End: 2022-06-25
Payer: MEDICARE

## 2022-06-22 VITALS
TEMPERATURE: 98 F | HEIGHT: 71 IN | DIASTOLIC BLOOD PRESSURE: 73 MMHG | WEIGHT: 170.6 LBS | HEART RATE: 89 BPM | OXYGEN SATURATION: 98 % | RESPIRATION RATE: 18 BRPM | BODY MASS INDEX: 23.88 KG/M2 | SYSTOLIC BLOOD PRESSURE: 107 MMHG

## 2022-06-22 DIAGNOSIS — C90.00 MULTIPLE MYELOMA NOT HAVING ACHIEVED REMISSION (HCC): Primary | ICD-10-CM

## 2022-06-22 DIAGNOSIS — Z94.81 S/P AUTOLOGOUS BONE MARROW TRANSPLANTATION (HCC): ICD-10-CM

## 2022-06-22 DIAGNOSIS — E55.9 VITAMIN D DEFICIENCY: ICD-10-CM

## 2022-06-22 LAB
ALBUMIN SERPL-MCNC: 3.7 G/DL (ref 3.5–5)
ALBUMIN/GLOB SERPL: 1.2 {RATIO} (ref 1.2–3.5)
ALP SERPL-CCNC: 74 U/L (ref 50–136)
ALT SERPL-CCNC: 19 U/L (ref 12–65)
ANION GAP SERPL CALC-SCNC: 8 MMOL/L (ref 7–16)
AST SERPL-CCNC: 8 U/L (ref 15–37)
BASOPHILS # BLD: 0.1 K/UL (ref 0–0.2)
BASOPHILS NFR BLD: 1 % (ref 0–2)
BILIRUB SERPL-MCNC: 0.3 MG/DL (ref 0.2–1.1)
BUN SERPL-MCNC: 12 MG/DL (ref 6–23)
CALCIUM SERPL-MCNC: 9.1 MG/DL (ref 8.3–10.4)
CHLORIDE SERPL-SCNC: 106 MMOL/L (ref 98–107)
CO2 SERPL-SCNC: 25 MMOL/L (ref 21–32)
CREAT SERPL-MCNC: 1.2 MG/DL (ref 0.8–1.5)
DIFFERENTIAL METHOD BLD: ABNORMAL
EOSINOPHIL # BLD: 0 K/UL (ref 0–0.8)
EOSINOPHIL NFR BLD: 0 % (ref 0.5–7.8)
ERYTHROCYTE [DISTWIDTH] IN BLOOD BY AUTOMATED COUNT: 18.5 % (ref 11.9–14.6)
GLOBULIN SER CALC-MCNC: 3.2 G/DL (ref 2.3–3.5)
GLUCOSE SERPL-MCNC: 84 MG/DL (ref 65–100)
HCT VFR BLD AUTO: 39.7 %
HGB BLD-MCNC: 12.9 G/DL (ref 13.6–17.2)
IMM GRANULOCYTES # BLD AUTO: 0 K/UL (ref 0–0.5)
IMM GRANULOCYTES NFR BLD AUTO: 0 % (ref 0–5)
LYMPHOCYTES # BLD: 3.6 K/UL (ref 0.5–4.6)
LYMPHOCYTES NFR BLD: 37 % (ref 13–44)
MCH RBC QN AUTO: 25.6 PG (ref 26.1–32.9)
MCHC RBC AUTO-ENTMCNC: 32.5 G/DL (ref 31.4–35)
MCV RBC AUTO: 78.9 FL (ref 79.6–97.8)
MONOCYTES # BLD: 1.2 K/UL (ref 0.1–1.3)
MONOCYTES NFR BLD: 12 % (ref 4–12)
NEUTS SEG # BLD: 4.9 K/UL (ref 1.7–8.2)
NEUTS SEG NFR BLD: 50 % (ref 43–78)
NRBC # BLD: 0 K/UL (ref 0–0.2)
PLATELET # BLD AUTO: 268 K/UL (ref 150–450)
PMV BLD AUTO: 9.8 FL (ref 9.4–12.3)
POTASSIUM SERPL-SCNC: 3.9 MMOL/L (ref 3.5–5.1)
PROT SERPL-MCNC: 6.9 G/DL (ref 6.3–8.2)
RBC # BLD AUTO: 5.03 M/UL (ref 4.23–5.6)
SODIUM SERPL-SCNC: 139 MMOL/L (ref 136–145)
WBC # BLD AUTO: 9.7 K/UL (ref 4.3–11.1)

## 2022-06-22 PROCEDURE — 82784 ASSAY IGA/IGD/IGG/IGM EACH: CPT

## 2022-06-22 PROCEDURE — G8427 DOCREV CUR MEDS BY ELIG CLIN: HCPCS | Performed by: INTERNAL MEDICINE

## 2022-06-22 PROCEDURE — 6360000002 HC RX W HCPCS: Performed by: INTERNAL MEDICINE

## 2022-06-22 PROCEDURE — G8420 CALC BMI NORM PARAMETERS: HCPCS | Performed by: INTERNAL MEDICINE

## 2022-06-22 PROCEDURE — G0009 ADMIN PNEUMOCOCCAL VACCINE: HCPCS | Performed by: INTERNAL MEDICINE

## 2022-06-22 PROCEDURE — 83521 IG LIGHT CHAINS FREE EACH: CPT

## 2022-06-22 PROCEDURE — 90670 PCV13 VACCINE IM: CPT | Performed by: INTERNAL MEDICINE

## 2022-06-22 PROCEDURE — 90472 IMMUNIZATION ADMIN EACH ADD: CPT | Performed by: INTERNAL MEDICINE

## 2022-06-22 PROCEDURE — 99215 OFFICE O/P EST HI 40 MIN: CPT | Performed by: INTERNAL MEDICINE

## 2022-06-22 PROCEDURE — 90734 MENACWYD/MENACWYCRM VACC IM: CPT | Performed by: INTERNAL MEDICINE

## 2022-06-22 PROCEDURE — G0010 ADMIN HEPATITIS B VACCINE: HCPCS | Performed by: INTERNAL MEDICINE

## 2022-06-22 PROCEDURE — 90471 IMMUNIZATION ADMIN: CPT | Performed by: INTERNAL MEDICINE

## 2022-06-22 PROCEDURE — 90713 POLIOVIRUS IPV SC/IM: CPT | Performed by: INTERNAL MEDICINE

## 2022-06-22 PROCEDURE — 2500000003 HC RX 250 WO HCPCS: Performed by: INTERNAL MEDICINE

## 2022-06-22 PROCEDURE — 36415 COLL VENOUS BLD VENIPUNCTURE: CPT

## 2022-06-22 PROCEDURE — 90700 DTAP VACCINE < 7 YRS IM: CPT | Performed by: INTERNAL MEDICINE

## 2022-06-22 PROCEDURE — 85025 COMPLETE CBC W/AUTO DIFF WBC: CPT

## 2022-06-22 PROCEDURE — 3017F COLORECTAL CA SCREEN DOC REV: CPT | Performed by: INTERNAL MEDICINE

## 2022-06-22 PROCEDURE — 90740 HEPB VACC 3 DOSE IMMUNSUP IM: CPT | Performed by: INTERNAL MEDICINE

## 2022-06-22 PROCEDURE — 1036F TOBACCO NON-USER: CPT | Performed by: INTERNAL MEDICINE

## 2022-06-22 RX ADMIN — DIPHTHERIA AND TETANUS TOXOIDS AND ACELLULAR PERTUSSIS VACCINE ADSORBED 0.5 ML: 10; 25; 25; 25; 8 SUSPENSION INTRAMUSCULAR at 10:40

## 2022-06-22 RX ADMIN — HEPATITIS B VACCINE (RECOMBINANT) 1 ML: 20 INJECTION, SUSPENSION INTRAMUSCULAR at 10:38

## 2022-06-22 RX ADMIN — MENINGOCOCCAL (GROUPS A, C, Y AND W-135) OLIGOSACCHARIDE DIPHTHERIA CRM197 CONJUGATE VACCINE 0.5 ML: KIT at 10:36

## 2022-06-22 RX ADMIN — PNEUMOCOCCAL 13-VALENT CONJUGATE VACCINE 0.5 ML: 2.2; 2.2; 2.2; 2.2; 2.2; 4.4; 2.2; 2.2; 2.2; 2.2; 2.2; 2.2; 2.2 INJECTION, SUSPENSION INTRAMUSCULAR at 10:33

## 2022-06-22 RX ADMIN — POLIOVIRUS TYPE 1 ANTIGEN (FORMALDEHYDE INACTIVATED), POLIOVIRUS TYPE 2 ANTIGEN (FORMALDEHYDE INACTIVATED), AND POLIOVIRUS TYPE 3 ANTIGEN (FORMALDEHYDE INACTIVATED) 0.5 ML: 40; 8; 32 INJECTION, SUSPENSION INTRAMUSCULAR at 10:32

## 2022-06-22 RX ADMIN — Medication 0.5 ML: at 10:34

## 2022-06-22 ASSESSMENT — PATIENT HEALTH QUESTIONNAIRE - PHQ9
2. FEELING DOWN, DEPRESSED OR HOPELESS: 0
SUM OF ALL RESPONSES TO PHQ QUESTIONS 1-9: 0
SUM OF ALL RESPONSES TO PHQ9 QUESTIONS 1 & 2: 0
1. LITTLE INTEREST OR PLEASURE IN DOING THINGS: 0
SUM OF ALL RESPONSES TO PHQ QUESTIONS 1-9: 0

## 2022-06-22 NOTE — PROGRESS NOTES
[address], [address] [place] [address]  Phone: [phone]           6/22/2022  Nishant Barlow  1965  [id number]         Satish Singh a 55year old -American man who has returned to my clinic for a follow-up visit; he was initially referred to [facility] Dr. Jefferson Conn. In 9/2020 he was diagnosed with Multiple Myeloma, Kappa light chain, del.13q, t( 11;14 ), ISS Stage 3; at diagnosis his Kappa level was 5824, he received 4 cycles of RVd and achieved a VGPR, his Kappa level decreased to 285. He underwent an Autologous PBHCT on 4/23/2021 and his Kappa level decreased to 113.        ALLERGIES:    No known drug allergies.        FAMILY HISTORY:    No hematologic disorders.        SOCIAL HISTORY:    He is  and lives with his wife. He used to work in a [address] [place] [address] [address]. He stopped smoking cigarettes in 6/2019.        PAST MEDICAL HISTORY:     Osteoarthritis, renal insufficiency, Interstitial Lung Disease, Vitamin D deficiency, MGUS, IgG Kappa and Multiple Myeloma.        ROS:  The patient complained of fatigue and parasthesia; all other systems negative.         PHYSICAL EXAM:   The patient was alert, awake and oriented, no acute distress was noted. Oral examination did not reveal any mucosal lesions. Lymph node examination did not reveal any adenopathy. Neck examination revealed a supple neck, no thyromegaly or masses were noted. Chest examination revealed normal vesicular breath sounds. Heart examination revealed S-1 and S-2 without any murmurs. Abdominal examination revealed a non-tender abdomen, bowel sounds were positive, no organomegaly could be appreciated. Examination of the extremities did not reveal any tenderness or erythema. Examination of the skin did not reveal any lesions.   Medical problems and test results were reviewed with the patient today.         KPS:    70.         LABORATORY INVESTIGATIONS:  CBC showed a WBC count of 9.7, ANC was 4.9, Hemoglobin was 12.9 and Platelets GUPK 260.        ASSESSMENT:    Multiple Myeloma, Kappa light chain; s/p  Autologous PBHCT; Vitamin D deficiency.  I spent a total of 42 minutes on the day of the visit, managing the care of this patient.        PLAN:      He should continue taking prophylactic Bactrim, supplemental Vitamin D and prophylactic Acyclovir as per protocol; he should also continue maintenance Revlimid, in 4/2023 he will complete his post-BMT Immunization.        tSefanie Fields MD  Hematology/BMT

## 2022-06-22 NOTE — PROGRESS NOTES
Arrived to the [facility]. Post BMT immunizations completed. Provided education on same. Patient instructed to report any side affects to ordering provider. Patient tolerated well. Any issues or concerns during appointment: none. Patient aware of next infusion appointment on 4/26/23  Discharged via wheelchair with spouse.

## 2022-06-22 NOTE — PROGRESS NOTES
6/22 Pt came in for follow up visit. He is doing great! Today he will get his 14 month post transplant immunizations.  We will see him back in 10 months for his 24 months immunizations

## 2022-06-22 NOTE — PATIENT INSTRUCTIONS
Patient Instructions from Today's Visit    Reason for Visit:  Follow up  14 month s/p ASCT    Plan:  - Today you will get your 14 month vaccines  - We are so glad you are doing great!  - We will see you back in 10 months for your 2yr post transplant vaccines then you will be done with vaccines   - If you need anything form our office please use the main office number [phone] and ask for Dr. Breanna Lopez, she will be following along with you now       Follow Up:  As scheduled    Recent Lab Results:  Hospital Outpatient Visit on 06/22/2022   Component Date Value Ref Range Status    WBC 06/22/2022 9.7  4.3 - 11.1 K/uL Final    RBC 06/22/2022 5.03  4.23 - 5.6 M/uL Final    Hemoglobin 06/22/2022 12.9* 13.6 - 17.2 g/dL Final    Hematocrit 06/22/2022 39.7  % Final    MCV 06/22/2022 78.9* 79.6 - 97.8 FL Final    MCH 06/22/2022 25.6* 26.1 - 32.9 PG Final    MCHC 06/22/2022 32.5  31.4 - 35.0 g/dL Final    RDW 06/22/2022 18.5* 11.9 - 14.6 % Final    Platelets 23/35/0866 268  150 - 450 K/uL Final    MPV 06/22/2022 9.8  9.4 - 12.3 FL Final    nRBC 06/22/2022 0.00  0.0 - 0.2 K/uL Final    **Note: Absolute NRBC parameter is now reported with Hemogram**    Differential Type 06/22/2022 AUTOMATED    Final    Seg Neutrophils 06/22/2022 50  43 - 78 % Final    Lymphocytes 06/22/2022 37  13 - 44 % Final    Monocytes 06/22/2022 12  4.0 - 12.0 % Final    Eosinophils % 06/22/2022 0* 0.5 - 7.8 % Final    Basophils 06/22/2022 1  0.0 - 2.0 % Final    Immature Granulocytes 06/22/2022 0  0.0 - 5.0 % Final    Segs Absolute 06/22/2022 4.9  1.7 - 8.2 K/UL Final    Absolute Lymph # 06/22/2022 3.6  0.5 - 4.6 K/UL Final    Absolute Mono # 06/22/2022 1.2  0.1 - 1.3 K/UL Final    Absolute Eos # 06/22/2022 0.0  0.0 - 0.8 K/UL Final    Basophils Absolute 06/22/2022 0.1  0.0 - 0.2 K/UL Final    Absolute Immature Granulocyte 06/22/2022 0.0  0.0 - 0.5 K/UL Final    Sodium 06/22/2022 139  136 - 145 mmol/L Final  Potassium 06/22/2022 3.9  3.5 - 5.1 mmol/L Final    Chloride 06/22/2022 106  98 - 107 mmol/L Final    CO2 06/22/2022 25  21 - 32 mmol/L Final    Anion Gap 06/22/2022 8  7 - 16 mmol/L Final    Glucose 06/22/2022 84  65 - 100 mg/dL Final    BUN 06/22/2022 12  6 - 23 MG/DL Final    CREATININE 06/22/2022 1.20  0.8 - 1.5 MG/DL Final    GFR  06/22/2022 >60  >60 ml/min/1.73m2 Final    GFR Non- 06/22/2022 >60  >60 ml/min/1.73m2 Final    Comment:      Estimated GFR is calculated using the Modification of Diet in Renal Disease (MDRD) Study equation, reported for both  Americans (GFRAA) and non- Americans (GFRNA), and normalized to 1.73m2 body surface area. The physician must decide which value applies to the patient. The MDRD study equation should only be used in individuals age 25 or older. It has not been validated for the following: pregnant women, patients with serious comorbid conditions,or on certain medications, or persons with extremes of body size, muscle mass, or nutritional status.       Calcium 06/22/2022 9.1  8.3 - 10.4 MG/DL Final    Total Bilirubin 06/22/2022 0.3  0.2 - 1.1 MG/DL Final    ALT 06/22/2022 19  12 - 65 U/L Final    AST 06/22/2022 8* 15 - 37 U/L Final    Alk Phosphatase 06/22/2022 74  50 - 136 U/L Final    Total Protein 06/22/2022 6.9  6.3 - 8.2 g/dL Final    Albumin 06/22/2022 3.7  3.5 - 5.0 g/dL Final    Globulin 06/22/2022 3.2  2.3 - 3.5 g/dL Final    Albumin/Globulin Ratio 06/22/2022 1.2  1.2 - 3.5   Final     Treatment Summary has been discussed and given to patient: n/a  -------------------------------------------------------------------------------------------------------------------  Please call our office at [phone] if you have any  of the following symptoms:   · Fever of 100.5 or greater  · Chills  · Shortness of breath  · Swelling or pain in one leg    After office hours an answering service is available and will contact a provider for emergencies or if you are experiencing any of the above symptoms.  Patient did express an interest in My Chart. My Chart log in information explained on the after visit summary printout at the [facility] Kelsy Rose Novan desk.     ANTONIA BentonN, RN  Hematology Navigator  [address] [place] [address]  Office: [phone]

## 2022-06-23 LAB
KAPPA LC FREE SER-MCNC: 76.9 MG/L (ref 3.3–19.4)
KAPPA LC FREE/LAMBDA FREE SER: 9.38 {RATIO} (ref 0.26–1.65)
LAMBDA LC FREE SERPL-MCNC: 8.2 MG/L (ref 5.7–26.3)

## 2022-06-26 LAB
ALBUMIN SERPL ELPH-MCNC: 3.7 G/DL (ref 2.9–4.4)
ALBUMIN/GLOB SERPL: 1.5 {RATIO} (ref 0.7–1.7)
ALPHA1 GLOB SERPL ELPH-MCNC: 0.3 G/DL (ref 0–0.4)
ALPHA2 GLOB SERPL ELPH-MCNC: 0.7 G/DL (ref 0.4–1)
B-GLOBULIN SERPL ELPH-MCNC: 0.8 G/DL (ref 0.7–1.3)
GAMMA GLOB SERPL ELPH-MCNC: 0.7 G/DL (ref 0.4–1.8)
GLOBULIN SER-MCNC: 2.5 G/DL (ref 2.2–3.9)
IGA SERPL-MCNC: 36 MG/DL (ref 90–386)
IGG SERPL-MCNC: 735 MG/DL (ref 603–1613)
IGM SERPL-MCNC: 14 MG/DL (ref 20–172)
INTERPRETATION SERPL IEP-IMP: ABNORMAL
M PROTEIN SERPL ELPH-MCNC: 0.3 G/DL
PROT SERPL-MCNC: 6.2 G/DL (ref 6–8.5)

## 2022-06-29 ENCOUNTER — TELEPHONE (OUTPATIENT)
Dept: ONCOLOGY | Age: 57
End: 2022-06-29

## 2023-04-26 ENCOUNTER — HOSPITAL ENCOUNTER (OUTPATIENT)
Dept: LAB | Age: 58
Discharge: HOME | End: 2023-04-29
Payer: MEDICARE

## 2023-04-26 ENCOUNTER — HOSPITAL ENCOUNTER (OUTPATIENT)
Dept: INFUSION THERAPY | Age: 58
Discharge: HOME | End: 2023-04-26
Payer: MEDICARE

## 2023-04-26 ENCOUNTER — OFFICE VISIT (OUTPATIENT)
Dept: ONCOLOGY | Age: 58
End: 2023-04-26
Payer: MEDICARE

## 2023-04-26 VITALS
BODY MASS INDEX: 27.03 KG/M2 | WEIGHT: 193.1 LBS | TEMPERATURE: 98.1 F | HEIGHT: 71 IN | RESPIRATION RATE: 16 BRPM | DIASTOLIC BLOOD PRESSURE: 79 MMHG | SYSTOLIC BLOOD PRESSURE: 126 MMHG | OXYGEN SATURATION: 92 % | HEART RATE: 81 BPM

## 2023-04-26 DIAGNOSIS — C90.00 MULTIPLE MYELOMA NOT HAVING ACHIEVED REMISSION (HCC): Primary | ICD-10-CM

## 2023-04-26 DIAGNOSIS — Z94.81 S/P AUTOLOGOUS BONE MARROW TRANSPLANTATION (HCC): ICD-10-CM

## 2023-04-26 DIAGNOSIS — C90.00 MULTIPLE MYELOMA NOT HAVING ACHIEVED REMISSION (HCC): ICD-10-CM

## 2023-04-26 DIAGNOSIS — E55.9 VITAMIN D DEFICIENCY: ICD-10-CM

## 2023-04-26 DIAGNOSIS — J84.9 ILD (INTERSTITIAL LUNG DISEASE) (HCC): ICD-10-CM

## 2023-04-26 LAB
25(OH)D3 SERPL-MCNC: 81.3 NG/ML (ref 30–100)
ALBUMIN SERPL-MCNC: 3.7 G/DL (ref 3.5–5)
ALBUMIN/GLOB SERPL: 1.1 (ref 0.4–1.6)
ALP SERPL-CCNC: 91 U/L (ref 50–136)
ALT SERPL-CCNC: 23 U/L (ref 12–65)
ANION GAP SERPL CALC-SCNC: 4 MMOL/L (ref 2–11)
AST SERPL-CCNC: 11 U/L (ref 15–37)
BASOPHILS # BLD: 0.3 K/UL (ref 0–0.2)
BASOPHILS NFR BLD: 3 % (ref 0–2)
BILIRUB SERPL-MCNC: 0.3 MG/DL (ref 0.2–1.1)
BUN SERPL-MCNC: 11 MG/DL (ref 6–23)
CALCIUM SERPL-MCNC: 9.2 MG/DL (ref 8.3–10.4)
CHLORIDE SERPL-SCNC: 106 MMOL/L (ref 101–110)
CO2 SERPL-SCNC: 30 MMOL/L (ref 21–32)
CREAT SERPL-MCNC: 1.2 MG/DL (ref 0.8–1.5)
DIFFERENTIAL METHOD BLD: ABNORMAL
EOSINOPHIL # BLD: 0.5 K/UL (ref 0–0.8)
EOSINOPHIL NFR BLD: 5 % (ref 0.5–7.8)
ERYTHROCYTE [DISTWIDTH] IN BLOOD BY AUTOMATED COUNT: 18.3 % (ref 11.9–14.6)
GLOBULIN SER CALC-MCNC: 3.3 G/DL (ref 2.8–4.5)
GLUCOSE SERPL-MCNC: 88 MG/DL (ref 65–100)
HCT VFR BLD AUTO: 46.9 % (ref 41.1–50.3)
HGB BLD-MCNC: 14.9 G/DL (ref 13.6–17.2)
IMM GRANULOCYTES # BLD AUTO: 0 K/UL (ref 0–0.5)
IMM GRANULOCYTES NFR BLD AUTO: 0 % (ref 0–5)
LYMPHOCYTES # BLD: 3.9 K/UL (ref 0.5–4.6)
LYMPHOCYTES NFR BLD: 43 % (ref 13–44)
MAGNESIUM SERPL-MCNC: 1.9 MG/DL (ref 1.8–2.4)
MCH RBC QN AUTO: 26.2 PG (ref 26.1–32.9)
MCHC RBC AUTO-ENTMCNC: 31.8 G/DL (ref 31.4–35)
MCV RBC AUTO: 82.6 FL (ref 82–102)
MONOCYTES # BLD: 1.1 K/UL (ref 0.1–1.3)
MONOCYTES NFR BLD: 12 % (ref 4–12)
NEUTS SEG # BLD: 3.4 K/UL (ref 1.7–8.2)
NEUTS SEG NFR BLD: 37 % (ref 43–78)
NRBC # BLD: 0 K/UL (ref 0–0.2)
PLATELET # BLD AUTO: 212 K/UL (ref 150–450)
PMV BLD AUTO: 10 FL (ref 9.4–12.3)
POTASSIUM SERPL-SCNC: 3.9 MMOL/L (ref 3.5–5.1)
PROT SERPL-MCNC: 7 G/DL (ref 6.3–8.2)
RBC # BLD AUTO: 5.68 M/UL (ref 4.23–5.6)
SODIUM SERPL-SCNC: 140 MMOL/L (ref 133–143)
WBC # BLD AUTO: 9.1 K/UL (ref 4.3–11.1)

## 2023-04-26 PROCEDURE — 90700 DTAP VACCINE < 7 YRS IM: CPT | Performed by: INTERNAL MEDICINE

## 2023-04-26 PROCEDURE — G0010 ADMIN HEPATITIS B VACCINE: HCPCS | Performed by: INTERNAL MEDICINE

## 2023-04-26 PROCEDURE — 90713 POLIOVIRUS IPV SC/IM: CPT | Performed by: INTERNAL MEDICINE

## 2023-04-26 PROCEDURE — 90472 IMMUNIZATION ADMIN EACH ADD: CPT | Performed by: INTERNAL MEDICINE

## 2023-04-26 PROCEDURE — 85025 COMPLETE CBC W/AUTO DIFF WBC: CPT

## 2023-04-26 PROCEDURE — 82306 VITAMIN D 25 HYDROXY: CPT

## 2023-04-26 PROCEDURE — 83735 ASSAY OF MAGNESIUM: CPT

## 2023-04-26 PROCEDURE — 90732 PPSV23 VACC 2 YRS+ SUBQ/IM: CPT | Performed by: INTERNAL MEDICINE

## 2023-04-26 PROCEDURE — 90707 MMR VACCINE SC: CPT | Performed by: INTERNAL MEDICINE

## 2023-04-26 PROCEDURE — G0009 ADMIN PNEUMOCOCCAL VACCINE: HCPCS | Performed by: INTERNAL MEDICINE

## 2023-04-26 PROCEDURE — G8419 CALC BMI OUT NRM PARAM NOF/U: HCPCS | Performed by: INTERNAL MEDICINE

## 2023-04-26 PROCEDURE — 90471 IMMUNIZATION ADMIN: CPT | Performed by: INTERNAL MEDICINE

## 2023-04-26 PROCEDURE — 82784 ASSAY IGA/IGD/IGG/IGM EACH: CPT

## 2023-04-26 PROCEDURE — 36415 COLL VENOUS BLD VENIPUNCTURE: CPT

## 2023-04-26 PROCEDURE — 84165 PROTEIN E-PHORESIS SERUM: CPT

## 2023-04-26 PROCEDURE — 80053 COMPREHEN METABOLIC PANEL: CPT

## 2023-04-26 PROCEDURE — 99215 OFFICE O/P EST HI 40 MIN: CPT | Performed by: INTERNAL MEDICINE

## 2023-04-26 PROCEDURE — 1036F TOBACCO NON-USER: CPT | Performed by: INTERNAL MEDICINE

## 2023-04-26 PROCEDURE — 6360000002 HC RX W HCPCS: Performed by: INTERNAL MEDICINE

## 2023-04-26 PROCEDURE — G8427 DOCREV CUR MEDS BY ELIG CLIN: HCPCS | Performed by: INTERNAL MEDICINE

## 2023-04-26 PROCEDURE — 83521 IG LIGHT CHAINS FREE EACH: CPT

## 2023-04-26 PROCEDURE — 90472 IMMUNIZATION ADMIN EACH ADD: CPT

## 2023-04-26 PROCEDURE — 90648 HIB PRP-T VACCINE 4 DOSE IM: CPT | Performed by: INTERNAL MEDICINE

## 2023-04-26 PROCEDURE — 86334 IMMUNOFIX E-PHORESIS SERUM: CPT

## 2023-04-26 PROCEDURE — 90740 HEPB VACC 3 DOSE IMMUNSUP IM: CPT | Performed by: INTERNAL MEDICINE

## 2023-04-26 PROCEDURE — 3017F COLORECTAL CA SCREEN DOC REV: CPT | Performed by: INTERNAL MEDICINE

## 2023-04-26 RX ORDER — PREGABALIN 75 MG/1
CAPSULE ORAL
COMMUNITY
Start: 2023-03-28

## 2023-04-26 RX ORDER — MORPHINE SULFATE 15 MG/1
15 TABLET, FILM COATED, EXTENDED RELEASE ORAL EVERY 12 HOURS
Qty: 60 TABLET | Refills: 0 | COMMUNITY
Start: 2023-04-12 | End: 2023-05-12

## 2023-04-26 RX ORDER — MORPHINE SULFATE 30 MG/1
TABLET, FILM COATED, EXTENDED RELEASE ORAL
COMMUNITY
Start: 2022-03-29

## 2023-04-26 RX ORDER — OXYCODONE AND ACETAMINOPHEN 10; 325 MG/1; MG/1
TABLET ORAL
COMMUNITY
Start: 2022-04-26

## 2023-04-26 RX ADMIN — POLIOVIRUS TYPE 1 ANTIGEN (FORMALDEHYDE INACTIVATED), POLIOVIRUS TYPE 2 ANTIGEN (FORMALDEHYDE INACTIVATED), AND POLIOVIRUS TYPE 3 ANTIGEN (FORMALDEHYDE INACTIVATED) 0.5 ML: 40; 8; 32 INJECTION, SUSPENSION INTRAMUSCULAR at 11:18

## 2023-04-26 RX ADMIN — PNEUMOCOCCAL VACCINE POLYVALENT 0.5 ML
25; 25; 25; 25; 25; 25; 25; 25; 25; 25; 25; 25; 25; 25; 25; 25; 25; 25; 25; 25; 25; 25; 25 INJECTION, SOLUTION INTRAMUSCULAR; SUBCUTANEOUS at 11:23

## 2023-04-26 RX ADMIN — MEASLES, MUMPS, AND RUBELLA VIRUS VACCINE LIVE 0.5 ML: 1000; 12500; 1000 INJECTION, POWDER, LYOPHILIZED, FOR SUSPENSION SUBCUTANEOUS at 11:19

## 2023-04-26 RX ADMIN — DIPHTHERIA AND TETANUS TOXOIDS AND ACELLULAR PERTUSSIS VACCINE ADSORBED 0.5 ML: 10; 25; 25; 25; 8 SUSPENSION INTRAMUSCULAR at 11:20

## 2023-04-26 RX ADMIN — HEPATITIS B VACCINE (RECOMBINANT) 1 ML: 20 INJECTION, SUSPENSION INTRAMUSCULAR at 11:21

## 2023-04-26 RX ADMIN — HAEMOPHILUS B POLYSACCHARIDE CONJUGATE VACCINE FOR INJ 0.5 ML: RECON SOLN at 11:22

## 2023-04-26 ASSESSMENT — PATIENT HEALTH QUESTIONNAIRE - PHQ9
1. LITTLE INTEREST OR PLEASURE IN DOING THINGS: 0
SUM OF ALL RESPONSES TO PHQ QUESTIONS 1-9: 1
SUM OF ALL RESPONSES TO PHQ QUESTIONS 1-9: 1
5. POOR APPETITE OR OVEREATING: 0
9. THOUGHTS THAT YOU WOULD BE BETTER OFF DEAD, OR OF HURTING YOURSELF: 0
2. FEELING DOWN, DEPRESSED OR HOPELESS: 0
8. MOVING OR SPEAKING SO SLOWLY THAT OTHER PEOPLE COULD HAVE NOTICED. OR THE OPPOSITE, BEING SO FIGETY OR RESTLESS THAT YOU HAVE BEEN MOVING AROUND A LOT MORE THAN USUAL: 0
SUM OF ALL RESPONSES TO PHQ QUESTIONS 1-9: 1
6. FEELING BAD ABOUT YOURSELF - OR THAT YOU ARE A FAILURE OR HAVE LET YOURSELF OR YOUR FAMILY DOWN: 0
4. FEELING TIRED OR HAVING LITTLE ENERGY: 1
3. TROUBLE FALLING OR STAYING ASLEEP: 0
SUM OF ALL RESPONSES TO PHQ9 QUESTIONS 1 & 2: 0
SUM OF ALL RESPONSES TO PHQ QUESTIONS 1-9: 1
7. TROUBLE CONCENTRATING ON THINGS, SUCH AS READING THE NEWSPAPER OR WATCHING TELEVISION: 0

## 2023-04-26 ASSESSMENT — ANXIETY QUESTIONNAIRES
GAD7 TOTAL SCORE: 0
1. FEELING NERVOUS, ANXIOUS, OR ON EDGE: 0
2. NOT BEING ABLE TO STOP OR CONTROL WORRYING: 0
4. TROUBLE RELAXING: 0
3. WORRYING TOO MUCH ABOUT DIFFERENT THINGS: 0
5. BEING SO RESTLESS THAT IT IS HARD TO SIT STILL: 0
IF YOU CHECKED OFF ANY PROBLEMS ON THIS QUESTIONNAIRE, HOW DIFFICULT HAVE THESE PROBLEMS MADE IT FOR YOU TO DO YOUR WORK, TAKE CARE OF THINGS AT HOME, OR GET ALONG WITH OTHER PEOPLE: NOT DIFFICULT AT ALL
6. BECOMING EASILY ANNOYED OR IRRITABLE: 0
7. FEELING AFRAID AS IF SOMETHING AWFUL MIGHT HAPPEN: 0

## 2023-04-26 NOTE — PROGRESS NOTES
Diagnosis: Multiple Myeloma  Transplant Date: 04/23/2021  WBR: + 81  Chemo regimen used:Melphalan  Labs needed at next visit: see orders  Labs not resulted that need follow-up: none  Next Appointment scheduled: 06/07 @ 0900. BMT assessments and toxicities completed. WBC/ANC = 19.4/14.4  Prophylactic medications started 04/22   Granix started on Day + 6 and last dose given on 05/05/2021. Toxicities greater than 2: none. Patient seen by Dr. West Thompson today and weekly. New orders received: Yes; IV Mag.  Issues: Labs drawn and reviewed; 4g IV Mag administered per Dr. West Thompson. Central line dressing changed. Patient presents with continued complaints of swelling and pain in feet/back.     Wero Choi RN
Progress Note          Name: Hardik Ye  : 1965  MRN: [id number]  Date of Service: 6/3/2021  Location of Service: [facility]    Length of Service: 15 minutes    Reason for Visit: fu    Note: Patient requested 2 meal vouchers for today. Per primary SW request, Piercetalishacrissy Amador, it was given to patient and his . Clinical Impression: Hardik Ye is a 48 y.o. male . Mental Status Exam, patient was dressed properly. No abnormal psychomotor movements observed. Intellectual functioning appeared to be intact. Mood and affect were congruent. Insight was adequate. Judgment was adequate. Speech was normal, clear. Thought process was coherent. Patient did not report suicidal or homicidal ideations, intent or plans. Patient denied self-injury behaviors. Patient denied alcohol and other substance abuse. Patient was oriented to the four spheres: self, place, time and situation. Patient response to intervention was appropriate. Patient progress was adequate. Protective factor: self-determination. Protective Factors: Current care for physical and mental illness, adequate insight and judgment, family support, cultural and Mosque beliefs and values that support self-care. Plans: PEDRO PABLO intends to follow up as needed.
Render Risk Assessment In Note?: no
Additional Notes: Lesion is measuring 1cm. Schedule on  day in May
Detail Level: Simple

## 2023-04-26 NOTE — PATIENT INSTRUCTIONS
Patient Instructions from Today's Visit    Reason for Visit:  Follow Up    Diagnosis Information:  https://www.[employer].com/. net/about-us/asco-answers-patient-education-materials/suky-axqvbax-fnkd-sheets    Plan:  Lab work looks stable today  You will receive the rest of your baby shots today (6 shots total)  We will start seeing you on an annual basis  Continue taking Revlimid as directed    Follow Up: We will bring you back in April 2024 for a follow up with  and lab work prior.     Recent Lab Results:  Hospital Outpatient Visit on 04/26/2023   Component Date Value Ref Range Status    WBC 04/26/2023 9.1  4.3 - 11.1 K/uL Final    RBC 04/26/2023 5.68 (H)  4.23 - 5.6 M/uL Final    Hemoglobin 04/26/2023 14.9  13.6 - 17.2 g/dL Final    Hematocrit 04/26/2023 46.9  41.1 - 50.3 % Final    MCV 04/26/2023 82.6  82.0 - 102.0 FL Final    MCH 04/26/2023 26.2  26.1 - 32.9 PG Final    MCHC 04/26/2023 31.8  31.4 - 35.0 g/dL Final    RDW 04/26/2023 18.3 (H)  11.9 - 14.6 % Final    Platelets 94/10/1012 212  150 - 450 K/uL Final    MPV 04/26/2023 10.0  9.4 - 12.3 FL Final    nRBC 04/26/2023 0.00  0.0 - 0.2 K/uL Final    **Note: Absolute NRBC parameter is now reported with Hemogram**    Seg Neutrophils 04/26/2023 37 (L)  43 - 78 % Final    Lymphocytes 04/26/2023 43  13 - 44 % Final    Monocytes 04/26/2023 12  4.0 - 12.0 % Final    Eosinophils % 04/26/2023 5  0.5 - 7.8 % Final    Basophils 04/26/2023 3 (H)  0.0 - 2.0 % Final    Immature Granulocytes 04/26/2023 0  0.0 - 5.0 % Final    Segs Absolute 04/26/2023 3.4  1.7 - 8.2 K/UL Final    Absolute Lymph # 04/26/2023 3.9  0.5 - 4.6 K/UL Final    Absolute Mono # 04/26/2023 1.1  0.1 - 1.3 K/UL Final    Absolute Eos # 04/26/2023 0.5  0.0 - 0.8 K/UL Final    Basophils Absolute 04/26/2023 0.3 (H)  0.0 - 0.2 K/UL Final    Absolute Immature Granulocyte 04/26/2023 0.0  0.0 - 0.5 K/UL Final    Differential Type 04/26/2023 AUTOMATED    Final    Sodium 04/26/2023 140  133 - 143 mmol/L Final

## 2023-04-26 NOTE — PROGRESS NOTES
[facility] [address], [address] [place] [address]  Phone: [phone]           4/26/2023  Soo Oh  1965  [id number]         Soo Oh is a 65year old -American man who has returned to my clinic for a follow-up visit; he was initially referred to me by Dr. Belen Saab. In 9/2020 he was diagnosed with Multiple Myeloma, Kappa light chain, del.13q, t( 11;14 ), ISS Stage 3; at diagnosis his Kappa level was 5824, he received 4 cycles of RVd and achieved a VGPR, his Kappa level decreased to 285. He underwent an Autologous PBHCT on 4/23/2021 and his Kappa level decreased to 113. ALLERGIES:    No known drug allergies. FAMILY HISTORY:    No hematologic disorders. SOCIAL HISTORY:    He is  and lives with his wife. He used to work in a [address] [place] [address]. He stopped smoking cigarettes in 6/2019. PAST MEDICAL HISTORY:     Osteoarthritis, renal insufficiency, Interstitial Lung Disease, Vitamin D deficiency, MGUS, IgG Kappa and Multiple Myeloma. ROS:  The patient complained of fatigue and parasthesia; all other systems negative. PHYSICAL EXAM:   The patient was alert, awake and oriented, no acute distress was noted. Oral examination did not reveal any mucosal lesions. Lymph node examination did not reveal any adenopathy. Neck examination revealed a supple neck, no thyromegaly or masses were noted. Chest examination revealed normal vesicular breath sounds. Heart examination revealed S-1 and S-2 without any murmurs. Abdominal examination revealed a non-tender abdomen, bowel sounds were positive, no organomegaly could be appreciated. Examination of the extremities did not reveal any tenderness or erythema. Examination of the skin did not reveal any lesions. Medical problems and test results were reviewed with the patient today. KPS:    70.          LABORATORY INVESTIGATIONS:  CBC showed a WBC count of 9.1, ANC was 3.4, Hemoglobin was

## 2023-04-26 NOTE — PROGRESS NOTES
Arrived to the [facility]. Post BMT immunizations completed. Provided education on each immunization. VIS handed out and discussed. Patient instructed to report any side effects to ordering provider. Patient tolerated well. Any issues or concerns during appointment: none. Patient aware of next [facility] appointment on 4/25/23 (date) at 8 AM (time). Discharged via wheelchair with wife.

## 2023-04-27 LAB
KAPPA LC FREE SER-MCNC: 119.8 MG/L (ref 3.3–19.4)
KAPPA LC FREE/LAMBDA FREE SER: 14.98 (ref 0.26–1.65)
LAMBDA LC FREE SERPL-MCNC: 8 MG/L (ref 5.7–26.3)

## 2023-05-01 LAB
ALBUMIN SERPL ELPH-MCNC: 3.5 G/DL (ref 2.9–4.4)
ALBUMIN/GLOB SERPL: 1.3 (ref 0.7–1.7)
ALPHA1 GLOB SERPL ELPH-MCNC: 0.3 G/DL (ref 0–0.4)
ALPHA2 GLOB SERPL ELPH-MCNC: 0.7 G/DL (ref 0.4–1)
B-GLOBULIN SERPL ELPH-MCNC: 0.9 G/DL (ref 0.7–1.3)
GAMMA GLOB SERPL ELPH-MCNC: 0.8 G/DL (ref 0.4–1.8)
GLOBULIN SER-MCNC: 2.7 G/DL (ref 2.2–3.9)
IGA SERPL-MCNC: 39 MG/DL (ref 90–386)
IGG SERPL-MCNC: 855 MG/DL (ref 603–1613)
IGM SERPL-MCNC: 10 MG/DL (ref 20–172)
INTERPRETATION SERPL IEP-IMP: ABNORMAL
M PROTEIN SERPL ELPH-MCNC: 0.3 G/DL
PROT SERPL-MCNC: 6.2 G/DL (ref 6–8.5)

## 2023-08-08 NOTE — PROGRESS NOTES
Patient refusing to proceed with procedure without being completly asleep with Anesthesia. Patient relates that his doctors told him he would be asleep. No

## 2023-11-17 RX ORDER — SULFAMETHOXAZOLE AND TRIMETHOPRIM 800; 160 MG/1; MG/1
TABLET ORAL
Qty: 12 TABLET | OUTPATIENT
Start: 2023-11-17

## 2024-04-24 DIAGNOSIS — E55.9 VITAMIN D DEFICIENCY: ICD-10-CM

## 2024-04-24 DIAGNOSIS — C90.00 MULTIPLE MYELOMA NOT HAVING ACHIEVED REMISSION (HCC): Primary | ICD-10-CM

## 2024-04-25 ENCOUNTER — OFFICE VISIT (OUTPATIENT)
Dept: ONCOLOGY | Age: 59
End: 2024-04-25
Payer: MEDICARE

## 2024-04-25 ENCOUNTER — HOSPITAL ENCOUNTER (OUTPATIENT)
Dept: LAB | Age: 59
Discharge: HOME | End: 2024-04-25
Payer: MEDICARE

## 2024-04-25 VITALS
DIASTOLIC BLOOD PRESSURE: 74 MMHG | HEART RATE: 70 BPM | WEIGHT: 203.8 LBS | OXYGEN SATURATION: 96 % | SYSTOLIC BLOOD PRESSURE: 104 MMHG | RESPIRATION RATE: 18 BRPM | BODY MASS INDEX: 28.53 KG/M2 | TEMPERATURE: 98.3 F | HEIGHT: 71 IN

## 2024-04-25 DIAGNOSIS — C90.00 MULTIPLE MYELOMA NOT HAVING ACHIEVED REMISSION (HCC): ICD-10-CM

## 2024-04-25 DIAGNOSIS — C90.00 MULTIPLE MYELOMA NOT HAVING ACHIEVED REMISSION (HCC): Primary | ICD-10-CM

## 2024-04-25 DIAGNOSIS — E55.9 VITAMIN D DEFICIENCY: ICD-10-CM

## 2024-04-25 DIAGNOSIS — Z94.81 S/P AUTOLOGOUS BONE MARROW TRANSPLANTATION (HCC): ICD-10-CM

## 2024-04-25 LAB
25(OH)D3 SERPL-MCNC: 81.2 NG/ML (ref 30–100)
ALBUMIN SERPL-MCNC: 3.6 G/DL (ref 3.5–5)
ALBUMIN/GLOB SERPL: 1.1 (ref 1–1.9)
ALP SERPL-CCNC: 92 U/L (ref 40–129)
ALT SERPL-CCNC: 17 U/L (ref 12–65)
ANION GAP SERPL CALC-SCNC: 10 MMOL/L (ref 9–18)
AST SERPL-CCNC: 14 U/L (ref 15–37)
BASOPHILS # BLD: 0.1 K/UL (ref 0–0.2)
BASOPHILS NFR BLD: 2 % (ref 0–2)
BILIRUB SERPL-MCNC: 0.3 MG/DL (ref 0–1.2)
BUN SERPL-MCNC: 15 MG/DL (ref 6–23)
CALCIUM SERPL-MCNC: 8.9 MG/DL (ref 8.8–10.2)
CHLORIDE SERPL-SCNC: 103 MMOL/L (ref 98–107)
CO2 SERPL-SCNC: 27 MMOL/L (ref 20–28)
CREAT SERPL-MCNC: 0.93 MG/DL (ref 0.8–1.3)
DIFFERENTIAL METHOD BLD: ABNORMAL
EOSINOPHIL # BLD: 0.3 K/UL (ref 0–0.8)
EOSINOPHIL NFR BLD: 5 % (ref 0.5–7.8)
ERYTHROCYTE [DISTWIDTH] IN BLOOD BY AUTOMATED COUNT: 19.6 % (ref 11.9–14.6)
GLOBULIN SER CALC-MCNC: 3.3 G/DL (ref 2.3–3.5)
GLUCOSE SERPL-MCNC: 88 MG/DL (ref 70–99)
HCT VFR BLD AUTO: 41.3 % (ref 41.1–50.3)
HGB BLD-MCNC: 13 G/DL (ref 13.6–17.2)
IMM GRANULOCYTES # BLD AUTO: 0 K/UL (ref 0–0.5)
IMM GRANULOCYTES NFR BLD AUTO: 0 % (ref 0–5)
KAPPA LC FREE SER-MCNC: 135 MG/L (ref 2.4–20.7)
KAPPA LC FREE/LAMBDA FREE SER: 26.8 (ref 0.2–0.8)
LAMBDA LC FREE SERPL-MCNC: 5 MG/L (ref 4.2–27.7)
LYMPHOCYTES # BLD: 1.5 K/UL (ref 0.5–4.6)
LYMPHOCYTES NFR BLD: 29 % (ref 13–44)
MCH RBC QN AUTO: 26.6 PG (ref 26.1–32.9)
MCHC RBC AUTO-ENTMCNC: 31.5 G/DL (ref 31.4–35)
MCV RBC AUTO: 84.6 FL (ref 82–102)
MONOCYTES # BLD: 0.7 K/UL (ref 0.1–1.3)
MONOCYTES NFR BLD: 13 % (ref 4–12)
NEUTS SEG # BLD: 2.6 K/UL (ref 1.7–8.2)
NEUTS SEG NFR BLD: 51 % (ref 43–78)
NRBC # BLD: 0 K/UL (ref 0–0.2)
PLATELET # BLD AUTO: 224 K/UL (ref 150–450)
PLATELET COMMENT: ADEQUATE
PMV BLD AUTO: 9.8 FL (ref 9.4–12.3)
POTASSIUM SERPL-SCNC: 4.7 MMOL/L (ref 3.5–5.1)
PROT SERPL-MCNC: 7 G/DL (ref 6.3–8.2)
RBC # BLD AUTO: 4.88 M/UL (ref 4.23–5.6)
RBC MORPH BLD: ABNORMAL
RBC MORPH BLD: ABNORMAL
SODIUM SERPL-SCNC: 140 MMOL/L (ref 136–145)
WBC # BLD AUTO: 5.2 K/UL (ref 4.3–11.1)
WBC MORPH BLD: ABNORMAL

## 2024-04-25 PROCEDURE — 80053 COMPREHEN METABOLIC PANEL: CPT

## 2024-04-25 PROCEDURE — 85025 COMPLETE CBC W/AUTO DIFF WBC: CPT

## 2024-04-25 PROCEDURE — 83521 IG LIGHT CHAINS FREE EACH: CPT

## 2024-04-25 PROCEDURE — 3017F COLORECTAL CA SCREEN DOC REV: CPT | Performed by: INTERNAL MEDICINE

## 2024-04-25 PROCEDURE — 36415 COLL VENOUS BLD VENIPUNCTURE: CPT

## 2024-04-25 PROCEDURE — 84165 PROTEIN E-PHORESIS SERUM: CPT

## 2024-04-25 PROCEDURE — 1036F TOBACCO NON-USER: CPT | Performed by: INTERNAL MEDICINE

## 2024-04-25 PROCEDURE — 99215 OFFICE O/P EST HI 40 MIN: CPT | Performed by: INTERNAL MEDICINE

## 2024-04-25 PROCEDURE — 82306 VITAMIN D 25 HYDROXY: CPT

## 2024-04-25 PROCEDURE — G8427 DOCREV CUR MEDS BY ELIG CLIN: HCPCS | Performed by: INTERNAL MEDICINE

## 2024-04-25 PROCEDURE — 86334 IMMUNOFIX E-PHORESIS SERUM: CPT

## 2024-04-25 PROCEDURE — 82232 ASSAY OF BETA-2 PROTEIN: CPT

## 2024-04-25 PROCEDURE — 82784 ASSAY IGA/IGD/IGG/IGM EACH: CPT

## 2024-04-25 PROCEDURE — G8419 CALC BMI OUT NRM PARAM NOF/U: HCPCS | Performed by: INTERNAL MEDICINE

## 2024-04-25 RX ORDER — DEXAMETHASONE 4 MG/1
4 TABLET ORAL 2 TIMES DAILY WITH MEALS
COMMUNITY
Start: 2024-02-28

## 2024-04-25 RX ORDER — HYDROCODONE BITARTRATE AND ACETAMINOPHEN 7.5; 325 MG/1; MG/1
1 TABLET ORAL NIGHTLY
COMMUNITY
Start: 2015-01-16

## 2024-04-25 NOTE — PROGRESS NOTES
patient today.         KPS:    70.         LABORATORY INVESTIGATIONS:  CBC showed a WBC count of 5.2, ANC was 2.6, Hemoglobin was 13.1 and Platelets were 224.        ASSESSMENT:    Multiple Myeloma, Kappa light chain; s/p  Autologous PBHCT; Vitamin D deficiency. I spent a total of 42 minutes on the day of the visit, managing the care of this patient.        PLAN:      He should continue taking supplemental Vitamin D and prophylactic Acyclovir as per protocol; he should also continue Pomalidomide/Isatuximab; at his next clinic visit I will re-check his CBC, CMP, Vitamin D level, Kappa/Lambda level, Beta-2 microglobulin level and SPEP with Immun[employer].        Santana Joshi MD  Hematology/BMT

## 2024-04-25 NOTE — PATIENT INSTRUCTIONS
Patient Information from Today's Visit        Treatment Summary has been discussed and given to patient:N/A  Follow Up:   We will bring you back in March 2025 for a follow up with  and lab work prior.    Please refer to After Visit Summary or [facility] for upcoming appointment information. If you have any questions regarding your upcoming schedule please reach out to your care team through [facility] or call [phone].          -------------------------------------------------------------------------------------------------------------------  Please call our office at [phone] if you have any  of the following symptoms:   Fever of 100.5 or greater  Chills  Shortness of breath  Swelling or pain in one leg    After office hours an answering service is available and will contact a provider for emergencies or if you are experiencing any of the above symptoms.    Patient has My Chart.  My Chart log in information explained on the after visit summary printout at the check-out desk.    Fatmata Covington MA

## 2024-04-26 LAB — B2 MICROGLOB SERPL-MCNC: 1.8 MG/L (ref 0.6–2.4)

## 2024-05-01 LAB
ALBUMIN SERPL ELPH-MCNC: 3.8 G/DL (ref 2.9–4.4)
ALBUMIN/GLOB SERPL: 1.5 (ref 0.7–1.7)
ALPHA1 GLOB SERPL ELPH-MCNC: 0.3 G/DL (ref 0–0.4)
ALPHA2 GLOB SERPL ELPH-MCNC: 0.8 G/DL (ref 0.4–1)
B-GLOBULIN SERPL ELPH-MCNC: 0.9 G/DL (ref 0.7–1.3)
GAMMA GLOB SERPL ELPH-MCNC: 0.5 G/DL (ref 0.4–1.8)
GLOBULIN SER-MCNC: 2.6 G/DL (ref 2.2–3.9)
IGA SERPL-MCNC: 18 MG/DL (ref 90–386)
IGG SERPL-MCNC: 685 MG/DL (ref 603–1613)
IGM SERPL-MCNC: 7 MG/DL (ref 20–172)
INTERPRETATION SERPL IEP-IMP: ABNORMAL
M PROTEIN SERPL ELPH-MCNC: 0.2 G/DL
PROT SERPL-MCNC: 6.4 G/DL (ref 6–8.5)

## 2025-03-20 DIAGNOSIS — C90.00 MULTIPLE MYELOMA NOT HAVING ACHIEVED REMISSION (HCC): Primary | ICD-10-CM

## 2025-03-20 DIAGNOSIS — E55.9 VITAMIN D DEFICIENCY: ICD-10-CM

## (undated) DEVICE — CONTAINER PREFIL FRMLN 40ML --

## (undated) DEVICE — SINGLE USE BIOPSY VALVE MAJ-210: Brand: SINGLE USE BIOPSY VALVE (STERILE)

## (undated) DEVICE — SINGLE USE SUCTION VALVE MAJ-209: Brand: SINGLE USE SUCTION VALVE (STERILE)

## (undated) DEVICE — FORCEPS BX L115CM ALGTR JAW STP DISP

## (undated) DEVICE — SYR 50ML SLIP TIP NSAF LF STRL --

## (undated) DEVICE — BE 105-8 BRONCHOSCOPE SWIVEL - 15MM ID/22MM OD (PATIENT PORT) X15MM OD (EQUIPMENT PORT). REUSABLE.  FITS COMPONENTS OF ADULT VENTILATOR CIRCUITS.  MOLDED OF POLYETHERIMIDE. INCLUDES TWO SILICONE RUBBER CAPS; ONE CAP ALLOWS FOR THE USE OF A SUCTIONING CATHETER WHILE THE OTHER CAP ALLOWS FOR THE USE OF A FIBER-OPTIC BRONCHOSCOPE WITHOUT SIGNIFICANT LOSS OF PEEP.: Brand: BE 105-8 BRONCHOSCOPE SWIVEL

## (undated) DEVICE — BRONCHOSCOPY PACK: Brand: MEDLINE INDUSTRIES, INC.

## (undated) DEVICE — MOUTHPIECE ENDOSCP 20X27MM --

## (undated) DEVICE — KENDALL RADIOLUCENT FOAM MONITORING ELECTRODE RECTANGULAR SHAPE: Brand: KENDALL